# Patient Record
Sex: MALE | Race: WHITE | NOT HISPANIC OR LATINO | URBAN - METROPOLITAN AREA
[De-identification: names, ages, dates, MRNs, and addresses within clinical notes are randomized per-mention and may not be internally consistent; named-entity substitution may affect disease eponyms.]

---

## 2018-03-27 ENCOUNTER — OUTPATIENT (OUTPATIENT)
Dept: OUTPATIENT SERVICES | Facility: HOSPITAL | Age: 63
LOS: 1 days | Discharge: HOME | End: 2018-03-27

## 2018-03-27 DIAGNOSIS — R10.9 UNSPECIFIED ABDOMINAL PAIN: ICD-10-CM

## 2022-04-18 ENCOUNTER — INPATIENT (INPATIENT)
Facility: HOSPITAL | Age: 67
LOS: 6 days | Discharge: NURSING, REHAB OR LONG-TERM CARE | End: 2022-04-25
Attending: HOSPITALIST | Admitting: HOSPITALIST
Payer: MEDICARE

## 2022-04-18 VITALS
WEIGHT: 231.93 LBS | RESPIRATION RATE: 20 BRPM | HEART RATE: 123 BPM | SYSTOLIC BLOOD PRESSURE: 205 MMHG | DIASTOLIC BLOOD PRESSURE: 109 MMHG | OXYGEN SATURATION: 92 % | HEIGHT: 72 IN | TEMPERATURE: 96 F

## 2022-04-18 LAB
ALBUMIN SERPL ELPH-MCNC: 4.5 G/DL — SIGNIFICANT CHANGE UP (ref 3.5–5.2)
ALP SERPL-CCNC: 87 U/L — SIGNIFICANT CHANGE UP (ref 30–115)
ALT FLD-CCNC: 9 U/L — SIGNIFICANT CHANGE UP (ref 0–41)
ANION GAP SERPL CALC-SCNC: 21 MMOL/L — HIGH (ref 7–14)
AST SERPL-CCNC: 10 U/L — SIGNIFICANT CHANGE UP (ref 0–41)
B-OH-BUTYR SERPL-SCNC: 1.6 MMOL/L — HIGH
BASE EXCESS BLDV CALC-SCNC: 0.9 MMOL/L — SIGNIFICANT CHANGE UP (ref -2–3)
BASOPHILS # BLD AUTO: 0.03 K/UL — SIGNIFICANT CHANGE UP (ref 0–0.2)
BASOPHILS NFR BLD AUTO: 0.2 % — SIGNIFICANT CHANGE UP (ref 0–1)
BILIRUB SERPL-MCNC: 1.2 MG/DL — SIGNIFICANT CHANGE UP (ref 0.2–1.2)
BUN SERPL-MCNC: 13 MG/DL — SIGNIFICANT CHANGE UP (ref 10–20)
CA-I SERPL-SCNC: 0.99 MMOL/L — LOW (ref 1.15–1.33)
CALCIUM SERPL-MCNC: 9.4 MG/DL — SIGNIFICANT CHANGE UP (ref 8.5–10.1)
CHLORIDE SERPL-SCNC: 91 MMOL/L — LOW (ref 98–110)
CO2 SERPL-SCNC: 23 MMOL/L — SIGNIFICANT CHANGE UP (ref 17–32)
CREAT SERPL-MCNC: 1.1 MG/DL — SIGNIFICANT CHANGE UP (ref 0.7–1.5)
EGFR: 74 ML/MIN/1.73M2 — SIGNIFICANT CHANGE UP
EOSINOPHIL # BLD AUTO: 0 K/UL — SIGNIFICANT CHANGE UP (ref 0–0.7)
EOSINOPHIL NFR BLD AUTO: 0 % — SIGNIFICANT CHANGE UP (ref 0–8)
GAS PNL BLDV: 137 MMOL/L — SIGNIFICANT CHANGE UP (ref 136–145)
GAS PNL BLDV: SIGNIFICANT CHANGE UP
GLUCOSE SERPL-MCNC: 306 MG/DL — HIGH (ref 70–99)
HCO3 BLDV-SCNC: 25 MMOL/L — SIGNIFICANT CHANGE UP (ref 22–29)
HCT VFR BLD CALC: 35.7 % — LOW (ref 42–52)
HCT VFR BLDA CALC: 32 % — LOW (ref 39–51)
HGB BLD CALC-MCNC: 10.8 G/DL — LOW (ref 12.6–17.4)
HGB BLD-MCNC: 12.8 G/DL — LOW (ref 14–18)
IMM GRANULOCYTES NFR BLD AUTO: 1.4 % — HIGH (ref 0.1–0.3)
LACTATE BLDV-MCNC: 1.4 MMOL/L — SIGNIFICANT CHANGE UP (ref 0.5–2)
LACTATE SERPL-SCNC: 2 MMOL/L — SIGNIFICANT CHANGE UP (ref 0.7–2)
LIDOCAIN IGE QN: 10 U/L — SIGNIFICANT CHANGE UP (ref 7–60)
LYMPHOCYTES # BLD AUTO: 0.63 K/UL — LOW (ref 1.2–3.4)
LYMPHOCYTES # BLD AUTO: 4.5 % — LOW (ref 20.5–51.1)
MCHC RBC-ENTMCNC: 31 PG — SIGNIFICANT CHANGE UP (ref 27–31)
MCHC RBC-ENTMCNC: 35.9 G/DL — SIGNIFICANT CHANGE UP (ref 32–37)
MCV RBC AUTO: 86.4 FL — SIGNIFICANT CHANGE UP (ref 80–94)
MONOCYTES # BLD AUTO: 1 K/UL — HIGH (ref 0.1–0.6)
MONOCYTES NFR BLD AUTO: 7.2 % — SIGNIFICANT CHANGE UP (ref 1.7–9.3)
NEUTROPHILS # BLD AUTO: 12.07 K/UL — HIGH (ref 1.4–6.5)
NEUTROPHILS NFR BLD AUTO: 86.7 % — HIGH (ref 42.2–75.2)
NRBC # BLD: 0 /100 WBCS — SIGNIFICANT CHANGE UP (ref 0–0)
PCO2 BLDV: 38 MMHG — LOW (ref 42–55)
PH BLDV: 7.43 — SIGNIFICANT CHANGE UP (ref 7.32–7.43)
PLATELET # BLD AUTO: 194 K/UL — SIGNIFICANT CHANGE UP (ref 130–400)
PO2 BLDV: 39 MMHG — SIGNIFICANT CHANGE UP
POTASSIUM BLDV-SCNC: 2.5 MMOL/L — CRITICAL LOW (ref 3.5–5.1)
POTASSIUM SERPL-MCNC: 3.4 MMOL/L — LOW (ref 3.5–5)
POTASSIUM SERPL-SCNC: 3.4 MMOL/L — LOW (ref 3.5–5)
PROT SERPL-MCNC: 7.6 G/DL — SIGNIFICANT CHANGE UP (ref 6–8)
RBC # BLD: 4.13 M/UL — LOW (ref 4.7–6.1)
RBC # FLD: 12.6 % — SIGNIFICANT CHANGE UP (ref 11.5–14.5)
SAO2 % BLDV: 68.7 % — SIGNIFICANT CHANGE UP
SODIUM SERPL-SCNC: 135 MMOL/L — SIGNIFICANT CHANGE UP (ref 135–146)
WBC # BLD: 13.92 K/UL — HIGH (ref 4.8–10.8)
WBC # FLD AUTO: 13.92 K/UL — HIGH (ref 4.8–10.8)

## 2022-04-18 PROCEDURE — 93010 ELECTROCARDIOGRAM REPORT: CPT

## 2022-04-18 PROCEDURE — 74177 CT ABD & PELVIS W/CONTRAST: CPT | Mod: 26,MA

## 2022-04-18 PROCEDURE — 99223 1ST HOSP IP/OBS HIGH 75: CPT

## 2022-04-18 PROCEDURE — 71045 X-RAY EXAM CHEST 1 VIEW: CPT | Mod: 26

## 2022-04-18 PROCEDURE — 99285 EMERGENCY DEPT VISIT HI MDM: CPT

## 2022-04-18 RX ORDER — ONDANSETRON 8 MG/1
4 TABLET, FILM COATED ORAL ONCE
Refills: 0 | Status: COMPLETED | OUTPATIENT
Start: 2022-04-18 | End: 2022-04-18

## 2022-04-18 RX ORDER — POTASSIUM CHLORIDE 20 MEQ
40 PACKET (EA) ORAL ONCE
Refills: 0 | Status: COMPLETED | OUTPATIENT
Start: 2022-04-18 | End: 2022-04-18

## 2022-04-18 RX ORDER — POTASSIUM CHLORIDE 20 MEQ
10 PACKET (EA) ORAL ONCE
Refills: 0 | Status: COMPLETED | OUTPATIENT
Start: 2022-04-18 | End: 2022-04-18

## 2022-04-18 RX ORDER — SODIUM CHLORIDE 9 MG/ML
1000 INJECTION INTRAMUSCULAR; INTRAVENOUS; SUBCUTANEOUS ONCE
Refills: 0 | Status: COMPLETED | OUTPATIENT
Start: 2022-04-18 | End: 2022-04-18

## 2022-04-18 RX ORDER — ACETAMINOPHEN 500 MG
975 TABLET ORAL ONCE
Refills: 0 | Status: COMPLETED | OUTPATIENT
Start: 2022-04-18 | End: 2022-04-18

## 2022-04-18 RX ADMIN — Medication 975 MILLIGRAM(S): at 22:59

## 2022-04-18 RX ADMIN — SODIUM CHLORIDE 1000 MILLILITER(S): 9 INJECTION INTRAMUSCULAR; INTRAVENOUS; SUBCUTANEOUS at 18:01

## 2022-04-18 RX ADMIN — SODIUM CHLORIDE 1000 MILLILITER(S): 9 INJECTION INTRAMUSCULAR; INTRAVENOUS; SUBCUTANEOUS at 21:02

## 2022-04-18 RX ADMIN — Medication 40 MILLIEQUIVALENT(S): at 22:44

## 2022-04-18 RX ADMIN — ONDANSETRON 4 MILLIGRAM(S): 8 TABLET, FILM COATED ORAL at 18:01

## 2022-04-18 RX ADMIN — Medication 10 MILLIEQUIVALENT(S): at 20:14

## 2022-04-18 RX ADMIN — Medication 975 MILLIGRAM(S): at 22:44

## 2022-04-18 NOTE — ED PROVIDER NOTE - ATTENDING APP SHARED VISIT CONTRIBUTION OF CARE
67-year-old male with past medical history of CAD status post stent, hypertension, diabetes, hyperlipidemia presents to ED for nausea vomiting and weakness that started yesterday.  Patient states associated with general abdominal pain.  She has been unable to tolerate p.o. and has felt increasingly weak.  No cough, shortness of breath, chest pain, palpitations, fever or chills.    Const: NAD  Eyes: PERRL, no conjunctival injection  HENT:  Neck supple without meningismus   CV: tachycardic, Warm, well-perfused extremities  RESP: CTA B/L, no tachypnea   GI: soft, non-tender, non-distended  MSK: No gross deformities appreciated  Skin: Warm, dry. No rashes  Neuro: Alert, CNs II-XII grossly intact. Sensation and motor function of extremities grossly intact.  Psych: Appropriate mood and affect.    will do labs, CT, UA and give fluids

## 2022-04-18 NOTE — H&P ADULT - ASSESSMENT
68yo M w/ pmhx cad s/p stent 2008, DM, HTN, HLD, presenting to the ED for evaluation of nausea, vomiting, weakness for the past two days. Admitted for mild DKA.    Patient uses mail order [facility] ([phone]). Please verify med rec in am or have family bring meds list or pill bottles.  Prelim med rec done based on surecripts.    #N/V/D likely secondary to mild compensated DKA  #HAGMA w/ metabolic alkalosis  #SIRS + on admission likely secondary to DKA (leukocytosis, tachycardia)  - compliant w/ meds, unclear trigger  - WBC 13.92, K 3.4, lactate 2 -> 1.4, AG 21, BHB 1.6, glucose 306  - delta/delta > 9 (HAGMA from DKA, metabolic alkalosis from contraction)   - get a1c/lipid profile; get u/a; get GI PCR; get bcx in am and monitor off abx    - covid negative, get RVP   - LR 75cc/hr, zofran prn  - start subq insulin regimen 10/4/4/4 (insulin naive)   - f/u am BMP and consider upgrade to ICU if AG still open after subq insulin  - clear liquid diet advance as tolerated   - admitted to stepdown unit     #mild hypoxia  - patient consistently 92-94% but patient denies SOB  - has significant smoking history, monitor off O2 and get o/p PFTs  - get echo due to cardiac history, monitor for fluid overload     #hypertensive urgency  - did not take po meds in am due to N/V  - c/w amlodipine 5mg, lisinopril 40mg (benazepril) (please verify meds)   - hold HCTZ 25mg qd due to contraction alkalosis     #CAD s/p stent 2008  #HLD  - toprol 100mg daily, atorvastatin 40mg hs    #hypokalemia  - K 3.4 s/p repletion  - f/u am bmp     #diabetic neuropathy  - c/w gabapentin (verify meds)     #anxiety/depression  - c/w paxil 30mg daily (verify meds)     DVT ppx: lovenox  GI ppx: none  Diet: clear liquid diet   Code Status: full code  Dispo: from home; f/u am bmp, admit to stepdown unit

## 2022-04-18 NOTE — ED PROVIDER NOTE - NS ED ROS FT
Constitutional: (-) fever (-) malaise (-) diaphoresis (-) chills (-) wt. loss (-) bodyaches (-) night sweats  Eyes: (-) visual changes (-) eye pain (-) eye discharge (-) photophobia (-) FB sensation  ENMT: (-) nasal or chest congestion (-) runny nose (-) sore throat (-) hoarseness  (-) hearing changes (-) ear pain (-) ear discharge or infections (-) neck pain (-) neck stiffness  Cardiac: (-) chest pain  (-) palpitations (-) syncope (-) edema  Respiratory: (-) cough (-) SOB (-) [place]  GI: (+) nausea (+) vomiting (-) diarrhea (+) abdominal pain   : (-) dysuria (-) increased frequency  (-) hematuria (-) incontinence  MS: (-) back pain (-) myalgia (-) muscle weakness (-)  joint pain  Neuro: (-) headache (-) dizziness (-) numbness/tingling to extremities B/L (-) weakness  Skin: (-) rash (-) laceration    Except as documented in the HPI, all other systems are negative.

## 2022-04-18 NOTE — H&P ADULT - NSHPSOCIALHISTORY_GEN_ALL_CORE
from home walks chris stewart  quit smoking in 2008  used to spoke 4 packs a day for many years, was unable to quantify

## 2022-04-18 NOTE — ED PROVIDER NOTE - PHYSICAL EXAMINATION
GENERAL: Well-nourished, Well-developed. NAD.  HEAD: No visible or palpable bumps or hematomas. No ecchymosis behind ears B/L.  Eyes: PERRLA, EOMI. No asymmetry. No nystagmus. No conjunctival injection. Non-icteric sclera.  ENMT: MMM.   Neck: Supple. FROM  CVS: RRR. Normal S1,S2. No murmurs appreciated on auscultation   RESP: No use of accessory muscles. Chest rise symmetrical with good expansion. Lungs clear to auscultation B/L. No wheezing, rales, or rhonchi auscultated.  GI: Normal auscultation of bowel sounds in all 4 quadrants. (+)diffuse mild abd ttp. Soft, Nondistended. No guarding or rebound tenderness. No CVAT B/L.  Skin: Warm, Dry. No rashes or lesions. Good cap refill < 2 sec B/L.  EXT: Radial and pedal pulses present B/L. No calf tenderness or swelling B/L. No palpable cords. No pedal edema B/L.  Neuro: AA&O x 3. Sensation grossly intact. Strength 5/5 B/L. Gait within normal limits.

## 2022-04-18 NOTE — H&P ADULT - HISTORY OF PRESENT ILLNESS
68 yo M pmhx cad s/p stent, dm, htn, hld, presenting to the ED for evaluation of nausea, vomiting, weakness and abdominal pain since yesterday. Pt reports yesterday had few episodes of NBNB vomiting, worsened today, pt states today very weak, unable to tolerate po. Pt reports diffuse abdominal discomfort. Denies any sick contacts. Denies fever, chills, diarrhea, chest pain, sob.    ED course:   vitals: /109, , T 96.4F, O2 92% RA -> O2 96% on 2L NC  labs: WBC 13.92, K 3.4, lactate 2 -> 1.4, AG 21, BHB 1.6, glucose 306  imaging:   - CT a/p IV con: Colonic diverticula noted with no evidence of diverticulitis. No evidence of bowel obstruction, colitis, inflammatory process, or ascites. No pneumoperitoneum.   - CXR wet read: cardiomegaly w/ b/l opacities?  given: NS 2L bolus total, KCL 50meq total, tylenol, zofran  progress: MAR spoke to ICU regarding possible insulin drip for DKA, ICU rejected but admitted to stepdown and said to monitor on subq insulin for now 66 yo M pmhx cad s/p stent, dm, htn, hld, presenting to the ED for evaluation of nausea, vomiting, weakness and abdominal pain since yesterday. Pt reports yesterday had few episodes of NBNB vomiting, worsened today, pt states today very weak, unable to tolerate po. Pt reports diffuse abdominal discomfort. Denies any sick contacts. Denies fever, chills, diarrhea, chest pain, sob.    ED course:   vitals: /109, , T 96.4F, O2 92% RA -> O2 96% on 2L NC  labs: WBC 13.92, K 3.4, lactate 2 -> 1.4, AG 21, BHB 1.6, glucose 306  imaging:   - CT a/p IV con: Colonic diverticula noted with no evidence of diverticulitis. No evidence of bowel obstruction, colitis, inflammatory process, or ascites. No pneumoperitoneum.   - CXR wet read: cardiomegaly w/ b/l opacities?  given: NS 2L bolus total, KCL 50meq total, tylenol, zofran  progress: MAR spoke to ICU regarding possible insulin drip for DKA, ICU rejected but admitted to stepdown and said to monitor on subq insulin for now 66yo M w/ pmhx cad s/p stent 2008, DM, HTN, HLD, presenting to the ED for evaluation of nausea, vomiting, weakness for the past two days. Pt reports yesterday had few episodes of NBNB vomitin. Patientt states today he was very weak and unable to tolerate po which prompted him to come to ED. Also reports watery diarrhea without hematochezia. Denies any sick contacts. Denies fever, chills, CP, SOB, coughing, dysuria. Reports compliance with meds including his diabetes meds but was not able to take them today due to N/V.     ED course:   vitals: /109, , T 96.4F, O2 92% RA -> O2 96% on 2L NC  labs: WBC 13.92, K 3.4, lactate 2 -> 1.4, AG 21, BHB 1.6, glucose 306  imaging:   - CT a/p IV con: Colonic diverticula noted with no evidence of diverticulitis. No evidence of bowel obstruction, colitis, inflammatory process, or ascites. No pneumoperitoneum.   - CXR wet read: cardiomegaly w/ b/l opacities?  given: NS 2L bolus total, KCL 50meq total, tylenol, zofran  progress: MAR spoke to ICU regarding possible insulin drip for DKA, ICU rejected but admitted to stepdown and said to monitor on subq insulin for now

## 2022-04-18 NOTE — ED PROVIDER NOTE - CLINICAL SUMMARY MEDICAL DECISION MAKING FREE TEXT BOX
67-year-old male presented to ED for nausea vomiting and diarrhea.  Patient remains tachycardic unable to tolerate p.o. and appears weak.  Will admit for further evaluation management.

## 2022-04-18 NOTE — ED PROVIDER NOTE - OBJECTIVE STATEMENT
66 yo M pmhx cad s/p stent, dm, htn, hld, presenting to the ED for evaluation of nausea, vomiting, weakness and abdominal pain since yesterday. Pt reports yesterday had few episodes of NBNB vomiting, worsened today, pt states today very weak, unable to tolerate po. Pt reports diffuse abdominal discomfort. Denies any sick contacts. Denies fever, chills, diarrhea, chest pain, sob.

## 2022-04-18 NOTE — H&P ADULT - NSHPPHYSICALEXAM_GEN_ALL_CORE
PHYSICAL EXAM:  GENERAL: diaphoretic but comfortable  HEAD:  Atraumatic, Normocephalic  EYES: EOMI, PERRLA, conjunctiva and sclera clear  ENT: Moist mucous membranes  NECK: Supple, No JVD  CHEST/LUNG: Clear to auscultation bilaterally; No rales, rhonchi, wheezing, or rubs. Unlabored respirations  HEART: tachycardic, regular rhythm; No murmurs, rubs, or gallops  ABDOMEN: Bowel sounds present; Soft, Nontender, Nondistended. No hepatomegally  EXTREMITIES:  2+ Peripheral Pulses, brisk capillary refill. No clubbing, cyanosis, or edema  NERVOUS SYSTEM:  Alert & Oriented X3, speech clear. No deficits   MSK: FROM all 4 extremities, full and equal strength  SKIN: No rashes or lesions

## 2022-04-18 NOTE — ED ADULT TRIAGE NOTE - BP NONINVASIVE DIASTOLIC (MM HG)
109
Implemented All Universal Safety Interventions:  [place] to call system. Call bell, personal items and telephone within reach. Instruct patient to call for assistance. Room bathroom lighting operational. Non-slip footwear when patient is off stretcher. Physically safe environment: no spills, clutter or unnecessary equipment. Stretcher in lowest position, wheels locked, appropriate side rails in place.

## 2022-04-18 NOTE — H&P ADULT - NSHPLABSRESULTS_GEN_ALL_CORE
LABS:  cret                        12.8   13.92 )-----------( 194      ( 18 Apr 2022 18:32 )             35.7     04-18    135  |  91<L>  |  13  ----------------------------<  306<H>  3.4<L>   |  23  |  1.1    Ca    9.4      18 Apr 2022 18:32    TPro  7.6  /  Alb  4.5  /  TBili  1.2  /  DBili  x   /  AST  10  /  ALT  9   /  AlkPhos  87  04-18

## 2022-04-18 NOTE — H&P ADULT - NSICDXPASTMEDICALHX_GEN_ALL_CORE_FT
PAST MEDICAL HISTORY:  Anxiety     CAD S/P percutaneous coronary angioplasty     Diabetes mellitus     HTN (hypertension)     Hyperlipidemia

## 2022-04-18 NOTE — ED PROVIDER NOTE - CARE PLAN
Principal Discharge DX:	Nausea and vomiting  Secondary Diagnosis:	Sinus tachycardia  Secondary Diagnosis:	Hypokalemia   1

## 2022-04-18 NOTE — ED ADULT NURSE NOTE - OBJECTIVE STATEMENT
pt alert and oriented x 3 but confused at times since this morning - son states he has been having vomiting and diarrhea since yesterday

## 2022-04-18 NOTE — H&P ADULT - ATTENDING COMMENTS
68 YO M with a PMH of CAD s/p stent, DM2, HTN, and HLD who presents to the hospital with a c/o persistent N/V for the past x 2 days. Associated with vague ABD discomfort and diaphoresis. Denies any diarrhea, fevers, CP, SOB, and palpitations.     In the ED, CT-AP w/ IV contrast showed no acute process. Glucose was 306, pt started on IVFs (NS). MICU consulted, pt accepted to SDU.     FMHx: Reviewed, not relevant    Physical exam shows pt in NAD. VSS, afebrile, not hypoxic on RA; diaphoretic. A&Ox3. Neuro exam without deficits, motor/sensory intact, no dysarthria, no facial asymmetry. Muscle strength/sensation intact. CTA B/L with no W/C/R. RRR, no M/G/R. ABD is soft and non-tender, normoactive BSs. LEs without swelling. No rashes. Labs and radiology as above.     N/V + ABD discomfort, suspect mild compensated DKA, rule out viral process. SDU. Serial BMPs. A1c. Lipids. Monitor electrolytes. IVFs (LR). Serial FSs Q2h. Do not overcorrect glucose, keep in range of 180-200, start D5LR if necessary. Start SC insulin STAT. PRN anti-emetics.   -FU RVP    Hypokalemia. Replace. Repeat BMP in the AM.     Hypertensive urgency. Restart home meds. Monitor VSs.   -Pt has been unable to take his BP meds the past x 2 days due to N/V    HX of CAD s/p stent, HTN, and HLD. Restart home meds, except as stated above. DVT PPX. Inform PCP of pt's admission to hospital. My note supersedes the residents note.     Spoke with family: Son (Angel) at bedside    Date seen by [email or web address] 66 YO M with a PMH of CAD s/p stent, DM2, HTN, and HLD who presents to the hospital with a c/o persistent N/V for the past x 2 days. Associated with vague ABD discomfort and diaphoresis. Denies any diarrhea, fevers, CP, SOB, and palpitations.     In the ED, CT-AP w/ IV contrast showed no acute process. Glucose was 306, pt started on IVFs (NS). MICU consulted, pt accepted to SDU.     FMHx: Reviewed, not relevant    Physical exam shows pt in NAD. VSS, afebrile, not hypoxic on RA; diaphoretic. A&Ox3. Neuro exam without deficits, motor/sensory intact, no dysarthria, no facial asymmetry. Muscle strength/sensation intact. CTA B/L with no W/C/R. RRR, no M/G/R. ABD is soft and non-tender, normoactive BSs. LEs without swelling. No rashes. Labs and radiology as above.     N/V + ABD discomfort, suspect mild compensated DKA, rule out viral process. SDU. Serial BMPs. A1c. Lipids. Monitor electrolytes. IVFs (LR). Serial FSs Q2h. Do not overcorrect glucose, keep in range of 180-200, start D5LR if necessary. Start SC insulin STAT. PRN anti-emetics.   -FU RVP  -Please obtain a UA    Hypokalemia. Replace. Repeat BMP in the AM.     Mixed picture: Delta-Delta > 9  -HAGMA (High Anion Gap Metabolic Acidosis) from diabetic ketoacidosis. IVFs (LR). Repeat lactate and BMP in the AM. Please obtain UA.   -Metabolic alkalosis from contraction. IVFs (LR). Repeat BMP in the AM.     Hypertensive urgency. Restart home meds. Monitor VSs.   -Pt has been unable to take his BP meds the past x 2 days due to N/V    HX of CAD s/p stent, HTN, and HLD. Restart home meds, except as stated above. DVT PPX. Inform PCP of pt's admission to hospital. My note supersedes the residents note.     Spoke with family: Son (Angel) at bedside    Date seen by Attendin22

## 2022-04-19 LAB
A1C WITH ESTIMATED AVERAGE GLUCOSE RESULT: 7.2 % — HIGH (ref 4–5.6)
ALBUMIN SERPL ELPH-MCNC: 2.2 G/DL — LOW (ref 3.5–5.2)
ALBUMIN SERPL ELPH-MCNC: 3.8 G/DL — SIGNIFICANT CHANGE UP (ref 3.5–5.2)
ALBUMIN SERPL ELPH-MCNC: 4.1 G/DL — SIGNIFICANT CHANGE UP (ref 3.5–5.2)
ALP SERPL-CCNC: 41 U/L — SIGNIFICANT CHANGE UP (ref 30–115)
ALP SERPL-CCNC: 77 U/L — SIGNIFICANT CHANGE UP (ref 30–115)
ALP SERPL-CCNC: 81 U/L — SIGNIFICANT CHANGE UP (ref 30–115)
ALT FLD-CCNC: 10 U/L — SIGNIFICANT CHANGE UP (ref 0–41)
ALT FLD-CCNC: 13 U/L — SIGNIFICANT CHANGE UP (ref 0–41)
ALT FLD-CCNC: 7 U/L — SIGNIFICANT CHANGE UP (ref 0–41)
ANION GAP SERPL CALC-SCNC: 12 MMOL/L — SIGNIFICANT CHANGE UP (ref 7–14)
ANION GAP SERPL CALC-SCNC: 14 MMOL/L — SIGNIFICANT CHANGE UP (ref 7–14)
ANION GAP SERPL CALC-SCNC: 14 MMOL/L — SIGNIFICANT CHANGE UP (ref 7–14)
ANION GAP SERPL CALC-SCNC: SIGNIFICANT CHANGE UP MMOL/L (ref 7–14)
APAP SERPL-MCNC: <5 UG/ML — LOW (ref 10–30)
APPEARANCE UR: CLEAR — SIGNIFICANT CHANGE UP
APTT BLD: 29.6 SEC — SIGNIFICANT CHANGE UP (ref 27–39.2)
AST SERPL-CCNC: 10 U/L — SIGNIFICANT CHANGE UP (ref 0–41)
AST SERPL-CCNC: 12 U/L — SIGNIFICANT CHANGE UP (ref 0–41)
AST SERPL-CCNC: 17 U/L — SIGNIFICANT CHANGE UP (ref 0–41)
BACTERIA # UR AUTO: NEGATIVE — SIGNIFICANT CHANGE UP
BASE EXCESS BLDA CALC-SCNC: 4.7 MMOL/L — HIGH (ref -2–3)
BASOPHILS # BLD AUTO: 0.03 K/UL — SIGNIFICANT CHANGE UP (ref 0–0.2)
BASOPHILS # BLD AUTO: 0.03 K/UL — SIGNIFICANT CHANGE UP (ref 0–0.2)
BASOPHILS NFR BLD AUTO: 0.2 % — SIGNIFICANT CHANGE UP (ref 0–1)
BASOPHILS NFR BLD AUTO: 0.3 % — SIGNIFICANT CHANGE UP (ref 0–1)
BILIRUB SERPL-MCNC: 0.5 MG/DL — SIGNIFICANT CHANGE UP (ref 0.2–1.2)
BILIRUB SERPL-MCNC: 0.8 MG/DL — SIGNIFICANT CHANGE UP (ref 0.2–1.2)
BILIRUB SERPL-MCNC: 1.1 MG/DL — SIGNIFICANT CHANGE UP (ref 0.2–1.2)
BILIRUB UR-MCNC: NEGATIVE — SIGNIFICANT CHANGE UP
BLD GP AB SCN SERPL QL: SIGNIFICANT CHANGE UP
BUN SERPL-MCNC: 11 MG/DL — SIGNIFICANT CHANGE UP (ref 10–20)
BUN SERPL-MCNC: 11 MG/DL — SIGNIFICANT CHANGE UP (ref 10–20)
BUN SERPL-MCNC: 13 MG/DL — SIGNIFICANT CHANGE UP (ref 10–20)
BUN SERPL-MCNC: 8 MG/DL — LOW (ref 10–20)
CALCIUM SERPL-MCNC: 8.5 MG/DL — SIGNIFICANT CHANGE UP (ref 8.5–10.1)
CALCIUM SERPL-MCNC: 8.8 MG/DL — SIGNIFICANT CHANGE UP (ref 8.5–10.1)
CALCIUM SERPL-MCNC: 9.1 MG/DL — SIGNIFICANT CHANGE UP (ref 8.5–10.1)
CALCIUM SERPL-MCNC: SIGNIFICANT CHANGE UP MG/DL (ref 8.5–10.1)
CHLORIDE SERPL-SCNC: 98 MMOL/L — SIGNIFICANT CHANGE UP (ref 98–110)
CHLORIDE SERPL-SCNC: 99 MMOL/L — SIGNIFICANT CHANGE UP (ref 98–110)
CHLORIDE SERPL-SCNC: 99 MMOL/L — SIGNIFICANT CHANGE UP (ref 98–110)
CHLORIDE SERPL-SCNC: <60 MMOL/L — LOW (ref 98–110)
CHOLEST SERPL-MCNC: 154 MG/DL — SIGNIFICANT CHANGE UP
CK SERPL-CCNC: 112 U/L — SIGNIFICANT CHANGE UP (ref 0–225)
CO2 SERPL-SCNC: 13 MMOL/L — LOW (ref 17–32)
CO2 SERPL-SCNC: 23 MMOL/L — SIGNIFICANT CHANGE UP (ref 17–32)
CO2 SERPL-SCNC: 23 MMOL/L — SIGNIFICANT CHANGE UP (ref 17–32)
CO2 SERPL-SCNC: 26 MMOL/L — SIGNIFICANT CHANGE UP (ref 17–32)
COLOR SPEC: YELLOW — SIGNIFICANT CHANGE UP
CREAT SERPL-MCNC: 0.9 MG/DL — SIGNIFICANT CHANGE UP (ref 0.7–1.5)
CREAT SERPL-MCNC: 1 MG/DL — SIGNIFICANT CHANGE UP (ref 0.7–1.5)
D DIMER BLD IA.RAPID-MCNC: 207 NG/ML DDU — SIGNIFICANT CHANGE UP (ref 0–230)
D DIMER BLD IA.RAPID-MCNC: 208 NG/ML DDU — SIGNIFICANT CHANGE UP (ref 0–230)
D DIMER BLD IA.RAPID-MCNC: 343 NG/ML DDU — HIGH (ref 0–230)
DIFF PNL FLD: ABNORMAL
EGFR: 83 ML/MIN/1.73M2 — SIGNIFICANT CHANGE UP
EGFR: 94 ML/MIN/1.73M2 — SIGNIFICANT CHANGE UP
EOSINOPHIL # BLD AUTO: 0 K/UL — SIGNIFICANT CHANGE UP (ref 0–0.7)
EOSINOPHIL # BLD AUTO: 0 K/UL — SIGNIFICANT CHANGE UP (ref 0–0.7)
EOSINOPHIL NFR BLD AUTO: 0 % — SIGNIFICANT CHANGE UP (ref 0–8)
EOSINOPHIL NFR BLD AUTO: 0 % — SIGNIFICANT CHANGE UP (ref 0–8)
EPI CELLS # UR: 5 /HPF — SIGNIFICANT CHANGE UP (ref 0–5)
ESTIMATED AVERAGE GLUCOSE: 160 MG/DL — HIGH (ref 68–114)
GAS PNL BLDA: SIGNIFICANT CHANGE UP
GLUCOSE BLDC GLUCOMTR-MCNC: 199 MG/DL — HIGH (ref 70–99)
GLUCOSE BLDC GLUCOMTR-MCNC: 201 MG/DL — HIGH (ref 70–99)
GLUCOSE BLDC GLUCOMTR-MCNC: 238 MG/DL — HIGH (ref 70–99)
GLUCOSE BLDC GLUCOMTR-MCNC: 241 MG/DL — HIGH (ref 70–99)
GLUCOSE BLDC GLUCOMTR-MCNC: 241 MG/DL — HIGH (ref 70–99)
GLUCOSE BLDC GLUCOMTR-MCNC: 243 MG/DL — HIGH (ref 70–99)
GLUCOSE BLDC GLUCOMTR-MCNC: 248 MG/DL — HIGH (ref 70–99)
GLUCOSE BLDC GLUCOMTR-MCNC: 257 MG/DL — HIGH (ref 70–99)
GLUCOSE SERPL-MCNC: 215 MG/DL — HIGH (ref 70–99)
GLUCOSE SERPL-MCNC: 266 MG/DL — HIGH (ref 70–99)
GLUCOSE SERPL-MCNC: 267 MG/DL — HIGH (ref 70–99)
GLUCOSE SERPL-MCNC: SIGNIFICANT CHANGE UP MG/DL (ref 70–99)
GLUCOSE UR QL: ABNORMAL
HCO3 BLDA-SCNC: 28 MMOL/L — SIGNIFICANT CHANGE UP (ref 21–28)
HCT VFR BLD CALC: 32.3 % — LOW (ref 42–52)
HCT VFR BLD CALC: 32.4 % — LOW (ref 42–52)
HCT VFR BLD CALC: SIGNIFICANT CHANGE UP % (ref 42–52)
HCV AB S/CO SERPL IA: 0.03 COI — SIGNIFICANT CHANGE UP
HCV AB SERPL-IMP: SIGNIFICANT CHANGE UP
HDLC SERPL-MCNC: 41 MG/DL — SIGNIFICANT CHANGE UP
HGB BLD-MCNC: 11.1 G/DL — LOW (ref 14–18)
HGB BLD-MCNC: 11.4 G/DL — LOW (ref 14–18)
HGB BLD-MCNC: SIGNIFICANT CHANGE UP G/DL (ref 14–18)
HYALINE CASTS # UR AUTO: 1 /LPF — SIGNIFICANT CHANGE UP (ref 0–7)
IMM GRANULOCYTES NFR BLD AUTO: 1.8 % — HIGH (ref 0.1–0.3)
IMM GRANULOCYTES NFR BLD AUTO: 1.9 % — HIGH (ref 0.1–0.3)
INR BLD: 1.36 RATIO — HIGH (ref 0.65–1.3)
KETONES UR-MCNC: ABNORMAL
LACTATE SERPL-SCNC: 1.7 MMOL/L — SIGNIFICANT CHANGE UP (ref 0.7–2)
LEUKOCYTE ESTERASE UR-ACNC: NEGATIVE — SIGNIFICANT CHANGE UP
LIPID PNL WITH DIRECT LDL SERPL: 94 MG/DL — SIGNIFICANT CHANGE UP
LYMPHOCYTES # BLD AUTO: 0.69 K/UL — LOW (ref 1.2–3.4)
LYMPHOCYTES # BLD AUTO: 0.75 K/UL — LOW (ref 1.2–3.4)
LYMPHOCYTES # BLD AUTO: 5.7 % — LOW (ref 20.5–51.1)
LYMPHOCYTES # BLD AUTO: 6.6 % — LOW (ref 20.5–51.1)
MAGNESIUM SERPL-MCNC: 1 MG/DL — LOW (ref 1.8–2.4)
MAGNESIUM SERPL-MCNC: 1.2 MG/DL — LOW (ref 1.8–2.4)
MAGNESIUM SERPL-MCNC: 1.3 MG/DL — LOW (ref 1.8–2.4)
MAGNESIUM SERPL-MCNC: 1.8 MG/DL — SIGNIFICANT CHANGE UP (ref 1.8–2.4)
MCHC RBC-ENTMCNC: 29.9 PG — SIGNIFICANT CHANGE UP (ref 27–31)
MCHC RBC-ENTMCNC: 31 PG — SIGNIFICANT CHANGE UP (ref 27–31)
MCHC RBC-ENTMCNC: 34.3 G/DL — SIGNIFICANT CHANGE UP (ref 32–37)
MCHC RBC-ENTMCNC: 35.3 G/DL — SIGNIFICANT CHANGE UP (ref 32–37)
MCHC RBC-ENTMCNC: SIGNIFICANT CHANGE UP G/DL (ref 32–37)
MCHC RBC-ENTMCNC: SIGNIFICANT CHANGE UP PG (ref 27–31)
MCV RBC AUTO: 87.3 FL — SIGNIFICANT CHANGE UP (ref 80–94)
MCV RBC AUTO: 87.8 FL — SIGNIFICANT CHANGE UP (ref 80–94)
MCV RBC AUTO: SIGNIFICANT CHANGE UP FL (ref 80–94)
MONOCYTES # BLD AUTO: 0.78 K/UL — HIGH (ref 0.1–0.6)
MONOCYTES # BLD AUTO: 0.82 K/UL — HIGH (ref 0.1–0.6)
MONOCYTES NFR BLD AUTO: 6.8 % — SIGNIFICANT CHANGE UP (ref 1.7–9.3)
MONOCYTES NFR BLD AUTO: 6.9 % — SIGNIFICANT CHANGE UP (ref 1.7–9.3)
NEUTROPHILS # BLD AUTO: 10.35 K/UL — HIGH (ref 1.4–6.5)
NEUTROPHILS # BLD AUTO: 9.58 K/UL — HIGH (ref 1.4–6.5)
NEUTROPHILS NFR BLD AUTO: 84.3 % — HIGH (ref 42.2–75.2)
NEUTROPHILS NFR BLD AUTO: 85.5 % — HIGH (ref 42.2–75.2)
NITRITE UR-MCNC: NEGATIVE — SIGNIFICANT CHANGE UP
NON HDL CHOLESTEROL: 113 MG/DL — SIGNIFICANT CHANGE UP
NRBC # BLD: 0 /100 WBCS — SIGNIFICANT CHANGE UP (ref 0–0)
NT-PROBNP SERPL-SCNC: 1609 PG/ML — HIGH (ref 0–300)
PCO2 BLDA: 37 MMHG — SIGNIFICANT CHANGE UP (ref 35–48)
PH BLDA: 7.49 — HIGH (ref 7.35–7.45)
PH UR: 6.5 — SIGNIFICANT CHANGE UP (ref 5–8)
PHOSPHATE SERPL-MCNC: 2.1 MG/DL — SIGNIFICANT CHANGE UP (ref 2.1–4.9)
PLATELET # BLD AUTO: 186 K/UL — SIGNIFICANT CHANGE UP (ref 130–400)
PLATELET # BLD AUTO: 189 K/UL — SIGNIFICANT CHANGE UP (ref 130–400)
PLATELET # BLD AUTO: SIGNIFICANT CHANGE UP K/UL (ref 130–400)
PO2 BLDA: 64 MMHG — LOW (ref 83–108)
POTASSIUM SERPL-MCNC: 3.5 MMOL/L — SIGNIFICANT CHANGE UP (ref 3.5–5)
POTASSIUM SERPL-MCNC: 3.7 MMOL/L — SIGNIFICANT CHANGE UP (ref 3.5–5)
POTASSIUM SERPL-MCNC: 3.8 MMOL/L — SIGNIFICANT CHANGE UP (ref 3.5–5)
POTASSIUM SERPL-MCNC: SIGNIFICANT CHANGE UP MMOL/L (ref 3.5–5)
POTASSIUM SERPL-SCNC: 3.5 MMOL/L — SIGNIFICANT CHANGE UP (ref 3.5–5)
POTASSIUM SERPL-SCNC: 3.7 MMOL/L — SIGNIFICANT CHANGE UP (ref 3.5–5)
POTASSIUM SERPL-SCNC: 3.8 MMOL/L — SIGNIFICANT CHANGE UP (ref 3.5–5)
POTASSIUM SERPL-SCNC: SIGNIFICANT CHANGE UP MMOL/L (ref 3.5–5)
PROCALCITONIN SERPL-MCNC: 0.17 NG/ML — HIGH (ref 0.02–0.1)
PROT SERPL-MCNC: 3.9 G/DL — LOW (ref 6–8)
PROT SERPL-MCNC: 6.5 G/DL — SIGNIFICANT CHANGE UP (ref 6–8)
PROT SERPL-MCNC: 6.7 G/DL — SIGNIFICANT CHANGE UP (ref 6–8)
PROT UR-MCNC: ABNORMAL
PROTHROM AB SERPL-ACNC: 15.6 SEC — HIGH (ref 9.95–12.87)
RAPID RVP RESULT: SIGNIFICANT CHANGE UP
RBC # BLD: 3.68 M/UL — LOW (ref 4.7–6.1)
RBC # BLD: 3.71 M/UL — LOW (ref 4.7–6.1)
RBC # BLD: SIGNIFICANT CHANGE UP M/UL (ref 4.7–6.1)
RBC # FLD: 12.6 % — SIGNIFICANT CHANGE UP (ref 11.5–14.5)
RBC # FLD: 12.7 % — SIGNIFICANT CHANGE UP (ref 11.5–14.5)
RBC # FLD: SIGNIFICANT CHANGE UP % (ref 11.5–14.5)
RBC CASTS # UR COMP ASSIST: 5 /HPF — HIGH (ref 0–4)
SALICYLATES SERPL-MCNC: <0.3 MG/DL — LOW (ref 4–30)
SAO2 % BLDA: 95.4 % — SIGNIFICANT CHANGE UP (ref 94–98)
SARS-COV-2 RNA SPEC QL NAA+PROBE: SIGNIFICANT CHANGE UP
SARS-COV-2 RNA SPEC QL NAA+PROBE: SIGNIFICANT CHANGE UP
SODIUM SERPL-SCNC: 135 MMOL/L — SIGNIFICANT CHANGE UP (ref 135–146)
SODIUM SERPL-SCNC: 136 MMOL/L — SIGNIFICANT CHANGE UP (ref 135–146)
SODIUM SERPL-SCNC: 137 MMOL/L — SIGNIFICANT CHANGE UP (ref 135–146)
SODIUM SERPL-SCNC: SIGNIFICANT CHANGE UP MMOL/L (ref 135–146)
SP GR SPEC: 1.02 — SIGNIFICANT CHANGE UP (ref 1.01–1.03)
TRIGL SERPL-MCNC: 95 MG/DL — SIGNIFICANT CHANGE UP
TROPONIN T SERPL-MCNC: <0.01 NG/ML — SIGNIFICANT CHANGE UP
TROPONIN T SERPL-MCNC: <0.01 NG/ML — SIGNIFICANT CHANGE UP
UROBILINOGEN FLD QL: ABNORMAL
WBC # BLD: 11.35 K/UL — HIGH (ref 4.8–10.8)
WBC # BLD: 12.11 K/UL — HIGH (ref 4.8–10.8)
WBC # BLD: 6.7 K/UL — SIGNIFICANT CHANGE UP (ref 4.8–10.8)
WBC # FLD AUTO: 11.35 K/UL — HIGH (ref 4.8–10.8)
WBC # FLD AUTO: 12.11 K/UL — HIGH (ref 4.8–10.8)
WBC # FLD AUTO: 6.7 K/UL — SIGNIFICANT CHANGE UP (ref 4.8–10.8)
WBC UR QL: 1 /HPF — SIGNIFICANT CHANGE UP (ref 0–5)

## 2022-04-19 PROCEDURE — 70450 CT HEAD/BRAIN W/O DYE: CPT | Mod: 26

## 2022-04-19 PROCEDURE — 99291 CRITICAL CARE FIRST HOUR: CPT

## 2022-04-19 PROCEDURE — 93010 ELECTROCARDIOGRAM REPORT: CPT | Mod: 77

## 2022-04-19 PROCEDURE — 93010 ELECTROCARDIOGRAM REPORT: CPT

## 2022-04-19 PROCEDURE — 93970 EXTREMITY STUDY: CPT | Mod: 26

## 2022-04-19 PROCEDURE — 71045 X-RAY EXAM CHEST 1 VIEW: CPT | Mod: 26

## 2022-04-19 PROCEDURE — 93306 TTE W/DOPPLER COMPLETE: CPT | Mod: 26

## 2022-04-19 RX ORDER — GLIMEPIRIDE 1 MG
1 TABLET ORAL
Qty: 0 | Refills: 0 | DISCHARGE

## 2022-04-19 RX ORDER — MAGNESIUM SULFATE 500 MG/ML
2 VIAL (ML) INJECTION ONCE
Refills: 0 | Status: COMPLETED | OUTPATIENT
Start: 2022-04-19 | End: 2022-04-19

## 2022-04-19 RX ORDER — AMLODIPINE BESYLATE 2.5 MG/1
10 TABLET ORAL DAILY
Refills: 0 | Status: DISCONTINUED | OUTPATIENT
Start: 2022-04-20 | End: 2022-04-25

## 2022-04-19 RX ORDER — ATORVASTATIN CALCIUM 80 MG/1
40 TABLET, FILM COATED ORAL AT BEDTIME
Refills: 0 | Status: DISCONTINUED | OUTPATIENT
Start: 2022-04-19 | End: 2022-04-25

## 2022-04-19 RX ORDER — INSULIN GLARGINE 100 [IU]/ML
10 INJECTION, SOLUTION SUBCUTANEOUS ONCE
Refills: 0 | Status: COMPLETED | OUTPATIENT
Start: 2022-04-19 | End: 2022-04-19

## 2022-04-19 RX ORDER — DEXTROSE 50 % IN WATER 50 %
15 SYRINGE (ML) INTRAVENOUS ONCE
Refills: 0 | Status: DISCONTINUED | OUTPATIENT
Start: 2022-04-19 | End: 2022-04-25

## 2022-04-19 RX ORDER — ACYCLOVIR SODIUM 500 MG
VIAL (EA) INTRAVENOUS
Refills: 0 | Status: DISCONTINUED | OUTPATIENT
Start: 2022-04-19 | End: 2022-04-20

## 2022-04-19 RX ORDER — INSULIN GLARGINE 100 [IU]/ML
15 INJECTION, SOLUTION SUBCUTANEOUS AT BEDTIME
Refills: 0 | Status: DISCONTINUED | OUTPATIENT
Start: 2022-04-19 | End: 2022-04-20

## 2022-04-19 RX ORDER — AMLODIPINE BESYLATE 2.5 MG/1
5 TABLET ORAL DAILY
Refills: 0 | Status: DISCONTINUED | OUTPATIENT
Start: 2022-04-19 | End: 2022-04-19

## 2022-04-19 RX ORDER — ACYCLOVIR SODIUM 500 MG
1050 VIAL (EA) INTRAVENOUS EVERY 8 HOURS
Refills: 0 | Status: DISCONTINUED | OUTPATIENT
Start: 2022-04-19 | End: 2022-04-20

## 2022-04-19 RX ORDER — FUROSEMIDE 40 MG
40 TABLET ORAL DAILY
Refills: 0 | Status: DISCONTINUED | OUTPATIENT
Start: 2022-04-19 | End: 2022-04-25

## 2022-04-19 RX ORDER — VANCOMYCIN HCL 1 G
1500 VIAL (EA) INTRAVENOUS EVERY 12 HOURS
Refills: 0 | Status: DISCONTINUED | OUTPATIENT
Start: 2022-04-19 | End: 2022-04-20

## 2022-04-19 RX ORDER — GLUCAGON INJECTION, SOLUTION 0.5 MG/.1ML
1 INJECTION, SOLUTION SUBCUTANEOUS ONCE
Refills: 0 | Status: DISCONTINUED | OUTPATIENT
Start: 2022-04-19 | End: 2022-04-25

## 2022-04-19 RX ORDER — AMPICILLIN TRIHYDRATE 250 MG
CAPSULE ORAL
Refills: 0 | Status: DISCONTINUED | OUTPATIENT
Start: 2022-04-19 | End: 2022-04-20

## 2022-04-19 RX ORDER — METFORMIN HYDROCHLORIDE 850 MG/1
1 TABLET ORAL
Qty: 0 | Refills: 0 | DISCHARGE

## 2022-04-19 RX ORDER — INSULIN LISPRO 100/ML
8 VIAL (ML) SUBCUTANEOUS
Refills: 0 | Status: DISCONTINUED | OUTPATIENT
Start: 2022-04-19 | End: 2022-04-19

## 2022-04-19 RX ORDER — POTASSIUM CHLORIDE 20 MEQ
40 PACKET (EA) ORAL ONCE
Refills: 0 | Status: COMPLETED | OUTPATIENT
Start: 2022-04-19 | End: 2022-04-19

## 2022-04-19 RX ORDER — DEXTROSE 50 % IN WATER 50 %
25 SYRINGE (ML) INTRAVENOUS ONCE
Refills: 0 | Status: DISCONTINUED | OUTPATIENT
Start: 2022-04-19 | End: 2022-04-25

## 2022-04-19 RX ORDER — ACYCLOVIR SODIUM 500 MG
1050 VIAL (EA) INTRAVENOUS ONCE
Refills: 0 | Status: COMPLETED | OUTPATIENT
Start: 2022-04-19 | End: 2022-04-19

## 2022-04-19 RX ORDER — ACETAMINOPHEN 500 MG
650 TABLET ORAL EVERY 6 HOURS
Refills: 0 | Status: DISCONTINUED | OUTPATIENT
Start: 2022-04-19 | End: 2022-04-21

## 2022-04-19 RX ORDER — SODIUM CHLORIDE 9 MG/ML
1000 INJECTION, SOLUTION INTRAVENOUS
Refills: 0 | Status: DISCONTINUED | OUTPATIENT
Start: 2022-04-19 | End: 2022-04-25

## 2022-04-19 RX ORDER — ACETAMINOPHEN 500 MG
650 TABLET ORAL EVERY 6 HOURS
Refills: 0 | Status: DISCONTINUED | OUTPATIENT
Start: 2022-04-19 | End: 2022-04-25

## 2022-04-19 RX ORDER — INSULIN GLARGINE 100 [IU]/ML
25 INJECTION, SOLUTION SUBCUTANEOUS AT BEDTIME
Refills: 0 | Status: DISCONTINUED | OUTPATIENT
Start: 2022-04-19 | End: 2022-04-19

## 2022-04-19 RX ORDER — CHLORHEXIDINE GLUCONATE 213 G/1000ML
1 SOLUTION TOPICAL
Refills: 0 | Status: DISCONTINUED | OUTPATIENT
Start: 2022-04-19 | End: 2022-04-25

## 2022-04-19 RX ORDER — CEFTRIAXONE 500 MG/1
2000 INJECTION, POWDER, FOR SOLUTION INTRAMUSCULAR; INTRAVENOUS EVERY 12 HOURS
Refills: 0 | Status: DISCONTINUED | OUTPATIENT
Start: 2022-04-19 | End: 2022-04-23

## 2022-04-19 RX ORDER — AMPICILLIN TRIHYDRATE 250 MG
1 CAPSULE ORAL EVERY 6 HOURS
Refills: 0 | Status: DISCONTINUED | OUTPATIENT
Start: 2022-04-19 | End: 2022-04-20

## 2022-04-19 RX ORDER — SODIUM CHLORIDE 9 MG/ML
500 INJECTION, SOLUTION INTRAVENOUS ONCE
Refills: 0 | Status: COMPLETED | OUTPATIENT
Start: 2022-04-19 | End: 2022-04-19

## 2022-04-19 RX ORDER — INSULIN LISPRO 100/ML
6 VIAL (ML) SUBCUTANEOUS
Refills: 0 | Status: DISCONTINUED | OUTPATIENT
Start: 2022-04-19 | End: 2022-04-20

## 2022-04-19 RX ORDER — INSULIN GLARGINE 100 [IU]/ML
10 INJECTION, SOLUTION SUBCUTANEOUS AT BEDTIME
Refills: 0 | Status: DISCONTINUED | OUTPATIENT
Start: 2022-04-19 | End: 2022-04-19

## 2022-04-19 RX ORDER — DEXTROSE 50 % IN WATER 50 %
12.5 SYRINGE (ML) INTRAVENOUS ONCE
Refills: 0 | Status: DISCONTINUED | OUTPATIENT
Start: 2022-04-19 | End: 2022-04-25

## 2022-04-19 RX ORDER — PANTOPRAZOLE SODIUM 20 MG/1
40 TABLET, DELAYED RELEASE ORAL
Refills: 0 | Status: DISCONTINUED | OUTPATIENT
Start: 2022-04-19 | End: 2022-04-25

## 2022-04-19 RX ORDER — INSULIN LISPRO 100/ML
4 VIAL (ML) SUBCUTANEOUS
Refills: 0 | Status: DISCONTINUED | OUTPATIENT
Start: 2022-04-19 | End: 2022-04-19

## 2022-04-19 RX ORDER — SODIUM CHLORIDE 9 MG/ML
1000 INJECTION, SOLUTION INTRAVENOUS
Refills: 0 | Status: DISCONTINUED | OUTPATIENT
Start: 2022-04-19 | End: 2022-04-19

## 2022-04-19 RX ORDER — ENOXAPARIN SODIUM 100 MG/ML
40 INJECTION SUBCUTANEOUS EVERY 24 HOURS
Refills: 0 | Status: DISCONTINUED | OUTPATIENT
Start: 2022-04-19 | End: 2022-04-25

## 2022-04-19 RX ORDER — METOPROLOL TARTRATE 50 MG
50 TABLET ORAL DAILY
Refills: 0 | Status: DISCONTINUED | OUTPATIENT
Start: 2022-04-19 | End: 2022-04-20

## 2022-04-19 RX ORDER — SODIUM CHLORIDE 9 MG/ML
1000 INJECTION, SOLUTION INTRAVENOUS ONCE
Refills: 0 | Status: DISCONTINUED | OUTPATIENT
Start: 2022-04-19 | End: 2022-04-19

## 2022-04-19 RX ORDER — LISINOPRIL 2.5 MG/1
40 TABLET ORAL DAILY
Refills: 0 | Status: DISCONTINUED | OUTPATIENT
Start: 2022-04-19 | End: 2022-04-25

## 2022-04-19 RX ORDER — METOPROLOL TARTRATE 50 MG
1 TABLET ORAL
Qty: 0 | Refills: 0 | DISCHARGE

## 2022-04-19 RX ORDER — AMLODIPINE BESYLATE 2.5 MG/1
5 TABLET ORAL ONCE
Refills: 0 | Status: COMPLETED | OUTPATIENT
Start: 2022-04-19 | End: 2022-04-19

## 2022-04-19 RX ORDER — AMPICILLIN TRIHYDRATE 250 MG
1 CAPSULE ORAL ONCE
Refills: 0 | Status: COMPLETED | OUTPATIENT
Start: 2022-04-19 | End: 2022-04-19

## 2022-04-19 RX ORDER — ONDANSETRON 8 MG/1
4 TABLET, FILM COATED ORAL EVERY 8 HOURS
Refills: 0 | Status: DISCONTINUED | OUTPATIENT
Start: 2022-04-19 | End: 2022-04-25

## 2022-04-19 RX ORDER — INSULIN LISPRO 100/ML
VIAL (ML) SUBCUTANEOUS
Refills: 0 | Status: DISCONTINUED | OUTPATIENT
Start: 2022-04-19 | End: 2022-04-20

## 2022-04-19 RX ADMIN — Medication 6 UNIT(S): at 17:20

## 2022-04-19 RX ADMIN — Medication 40 MILLIGRAM(S): at 22:04

## 2022-04-19 RX ADMIN — Medication 650 MILLIGRAM(S): at 18:10

## 2022-04-19 RX ADMIN — Medication 1: at 12:03

## 2022-04-19 RX ADMIN — AMLODIPINE BESYLATE 5 MILLIGRAM(S): 2.5 TABLET ORAL at 12:04

## 2022-04-19 RX ADMIN — ENOXAPARIN SODIUM 40 MILLIGRAM(S): 100 INJECTION SUBCUTANEOUS at 06:55

## 2022-04-19 RX ADMIN — SODIUM CHLORIDE 100 MILLILITER(S): 9 INJECTION, SOLUTION INTRAVENOUS at 14:11

## 2022-04-19 RX ADMIN — Medication 2: at 17:21

## 2022-04-19 RX ADMIN — INSULIN GLARGINE 10 UNIT(S): 100 INJECTION, SOLUTION SUBCUTANEOUS at 01:26

## 2022-04-19 RX ADMIN — Medication 108 GRAM(S): at 14:10

## 2022-04-19 RX ADMIN — Medication 300 MILLIGRAM(S): at 18:16

## 2022-04-19 RX ADMIN — SODIUM CHLORIDE 100 MILLILITER(S): 9 INJECTION, SOLUTION INTRAVENOUS at 18:00

## 2022-04-19 RX ADMIN — Medication 25 GRAM(S): at 14:10

## 2022-04-19 RX ADMIN — Medication 271 MILLIGRAM(S): at 16:56

## 2022-04-19 RX ADMIN — CEFTRIAXONE 100 MILLIGRAM(S): 500 INJECTION, POWDER, FOR SOLUTION INTRAMUSCULAR; INTRAVENOUS at 22:03

## 2022-04-19 RX ADMIN — Medication 50 MILLIGRAM(S): at 01:52

## 2022-04-19 RX ADMIN — Medication 6 UNIT(S): at 12:03

## 2022-04-19 RX ADMIN — Medication 40 MILLIEQUIVALENT(S): at 06:50

## 2022-04-19 RX ADMIN — Medication 4 UNIT(S): at 01:26

## 2022-04-19 RX ADMIN — SODIUM CHLORIDE 500 MILLILITER(S): 9 INJECTION, SOLUTION INTRAVENOUS at 12:45

## 2022-04-19 RX ADMIN — PANTOPRAZOLE SODIUM 40 MILLIGRAM(S): 20 TABLET, DELAYED RELEASE ORAL at 17:25

## 2022-04-19 RX ADMIN — AMLODIPINE BESYLATE 5 MILLIGRAM(S): 2.5 TABLET ORAL at 06:55

## 2022-04-19 RX ADMIN — Medication 4 UNIT(S): at 07:56

## 2022-04-19 RX ADMIN — Medication 2: at 07:55

## 2022-04-19 RX ADMIN — Medication 108 GRAM(S): at 22:04

## 2022-04-19 RX ADMIN — CHLORHEXIDINE GLUCONATE 1 APPLICATION(S): 213 SOLUTION TOPICAL at 06:55

## 2022-04-19 RX ADMIN — LISINOPRIL 40 MILLIGRAM(S): 2.5 TABLET ORAL at 01:52

## 2022-04-19 RX ADMIN — SODIUM CHLORIDE 75 MILLILITER(S): 9 INJECTION, SOLUTION INTRAVENOUS at 07:57

## 2022-04-19 RX ADMIN — SODIUM CHLORIDE 75 MILLILITER(S): 9 INJECTION, SOLUTION INTRAVENOUS at 00:58

## 2022-04-19 RX ADMIN — SODIUM CHLORIDE 100 MILLILITER(S): 9 INJECTION, SOLUTION INTRAVENOUS at 12:06

## 2022-04-19 RX ADMIN — INSULIN GLARGINE 10 UNIT(S): 100 INJECTION, SOLUTION SUBCUTANEOUS at 13:01

## 2022-04-19 RX ADMIN — Medication 30 MILLIGRAM(S): at 12:05

## 2022-04-19 NOTE — GOALS OF CARE CONVERSATION - ADVANCED CARE PLANNING - CONVERSATION DETAILS
Team had a discussion with the patient and their family regarding their chronic illnesses and goals of care. Patient and their family understand the extent of their condition. Patient would like to pursue DNR/DNI

## 2022-04-19 NOTE — PROGRESS NOTE ADULT - ASSESSMENT
66yo M w/ pmhx cad s/p stent 2008, DM, HTN, HLD, presenting to the ED for evaluation of nausea, vomiting, weakness for the past two days. Admitted for mild DKA.    Patient uses mail order [facility] ([phone]). Please verify med rec in am or have family bring meds list or pill bottles.  Prelim med rec done based on surecripts.    #N/V/D likely secondary to mild compensated DKA  #HAGMA w/ metabolic alkalosis  #SIRS + on admission likely secondary to DKA (leukocytosis, tachycardia)  - compliant w/ meds, unclear trigger  - WBC 13.92, K 3.4, lactate 2 -> 1.4, AG 21, BHB 1.6, glucose 306  - delta/delta > 9 (HAGMA from DKA, metabolic alkalosis from contraction)   - get a1c/lipid profile; get u/a; get GI PCR; get bcx in am and monitor off abx    - covid negative, get RVP   - LR 75cc/hr, zofran prn  - start subq insulin regimen 10/4/4/4 (insulin naive)   - f/u am BMP and consider upgrade to ICU if AG still open after subq insulin  - clear liquid diet advance as tolerated   - admitted to stepdown unit     #mild hypoxia  - patient consistently 92-94% but patient denies SOB  - has significant smoking history, monitor off O2 and get o/p PFTs  - get echo due to cardiac history, monitor for fluid overload     #hypertensive urgency  - did not take po meds in am due to N/V  - c/w amlodipine 5mg, lisinopril 40mg (benazepril) (please verify meds)   - hold HCTZ 25mg qd due to contraction alkalosis     #CAD s/p stent 2008  #HLD  - toprol 100mg daily, atorvastatin 40mg hs    #hypokalemia  - K 3.4 s/p repletion  - f/u am bmp     #diabetic neuropathy  - c/w gabapentin (verify meds)     #anxiety/depression  - c/w paxil 30mg daily (verify meds)     DVT ppx: lovenox  GI ppx: none  Diet: clear liquid diet   Code Status: full code  Dispo: from home; f/u am bmp, admit to stepdown unit       68yo M w/ pmhx cad s/p stent 2008, DM, HTN, HLD, presenting to the ED for evaluation of nausea, vomiting, weakness for the past two days. Admitted for mild DKA.     66yo M w/ pmhx cad s/p stent 2008, DM, HTN, HLD, presenting to the ED for evaluation of nausea, vomiting, weakness for the past two days. Admitted for mild DKA.    IMPRESSION:  HAGMA with NAGMA  HO DM with diabetic neuropathy    #N/V/D likely secondary to mild compensated DKA  #HAGMA w/ metabolic alkalosis  #SIRS + on admission likely secondary to DKA (leukocytosis, tachycardia)  - WBC 13.92, K 3.4, lactate 2 -> 1.4, AG 21, BHB 1.6, glucose 306  - delta/delta > 9 (HAGMA from DKA, metabolic alkalosis from contraction)   - Chest xray Linear opacity overlies the right midlung field.  - CT AB/P No evidence of abdominal or pelvic mass or inflammatory process  - R/o infectious etiology   - A1c: 7.2   - get a1c/lipid profile; get u/a; get GI PCR; get bcx in am and monitor off abx    - covid negative, get RVP   - LR 75cc/hr, zofran prn  - start subq insulin regimen 10/4/4/4 (insulin naive)   - f/u am BMP and consider upgrade to ICU if AG still open after subq insulin  - clear liquid diet advance as tolerated   - admitted to stepdown unit     #mild hypoxia  - patient consistently 92-94% but patient denies SOB  - has significant smoking history, monitor off O2 and get o/p PFTs  - get echo due to cardiac history, monitor for fluid overload     #hypertensive urgency  - did not take po meds in am due to N/V  - c/w amlodipine 5mg, lisinopril 40mg (benazepril) (please verify meds)   - hold HCTZ 25mg qd due to contraction alkalosis     #CAD s/p stent 2008  #HLD  - toprol 100mg daily, atorvastatin 40mg hs    #hypokalemia  - K 3.4 s/p repletion  - f/u am bmp     #diabetic neuropathy  - c/w gabapentin (verify meds)     #anxiety/depression  - c/w paxil 30mg daily (verify meds)     DVT ppx: lovenox  GI ppx: none  Diet: clear liquid diet   Code Status: full code  Dispo: from home; f/u am bmp, admit to stepdown unit           66yo M w/ pmhx cad s/p stent 2008, DM, HTN, HLD, presenting to the ED for evaluation of nausea, vomiting, weakness for the past two days. Admitted for mild DKA.    IMPRESSION:  ANGIE with NAGMA  HO DM with diabetic neuropathy    #N/V/D likely secondary to mild compensated DKA  #HAGMA w/ metabolic alkalosis  #SIRS + on admission likely secondary to DKA (leukocytosis, tachycardia)  - WBC 13.92, K 3.4, lactate 2 -> 1.4, AG 21, BHB 1.6, glucose 306  - delta/delta > 9 (HAGMA from DKA, metabolic alkalosis from contraction)   - Chest xray Linear opacity overlies the right midlung field.  - CT AB/P No evidence of abdominal or pelvic mass or inflammatory process  - R/o infectious etiology   - A1c: 7.2   - C/w incentive spirometry  - LR 100cc/hr, zofran prn  - Increase Sq insulin regimen 15/6/6/6   - Monitor BMP   - clear liquid diet advance as tolerated   - F/u RVP  - F/u UA, Ucx   - F/u Bcx   - F/u D-dimer  - F/u procal   - F/u CK   - F/u mag and phos     #Mild hypoxia  - patient consistently 92-94% but patient denies SOB  - has significant smoking history, monitor off O2 and get o/p PFTs  - BNP 1609  - C/w incentive spirometry     #Hypertensive urgency  - did not take po meds in am due to N/V  - Increase amlodipine from 5mg to 10mg, lisinopril 40mg (benazepril)   - hold HCTZ 25mg qd due to contraction alkalosis     #CAD s/p stent 2008  #HLD  - toprol 100mg daily, atorvastatin 40mg hs    #Diabetic neuropathy  - c/w gabapentin    #Anxiety/depression  - c/w paxil 30mg daily     #Misc   - DVT prophylaxis: Lovenox  - GI prophylaxis: none  - Diet: clear liquid diet   - Code Status: full code  - Disposition: SDU     **pending medrec**         68yo M w/ pmhx cad s/p stent 2008, DM, HTN, HLD, presenting to the ED for evaluation of nausea, vomiting, weakness for the past two days. Admitted for mild DKA.    IMPRESSION:  ANGIE with NAGMA  HO DM with diabetic neuropathy    #N/V/D likely secondary to mild compensated DKA  #HAGMA w/ metabolic alkalosis  #SIRS + on admission likely secondary to DKA (leukocytosis, tachycardia)  - WBC 13.92, K 3.4, lactate 2 -> 1.4, AG 21, BHB 1.6, glucose 306  - delta/delta > 9 (HAGMA from DKA, metabolic alkalosis from contraction)   - Chest xray Linear opacity overlies the right midlung field.  - CT AB/P No evidence of abdominal or pelvic mass or inflammatory process  - R/o infectious etiology   - A1c: 7.2   - C/w incentive spirometry  - LR 100cc/hr, zofran prn  - Increase Sq insulin regimen 15/6/6/6   - Monitor BMP   - clear liquid diet advance as tolerated   - F/u RVP  - F/u UA, Ucx   - F/u Bcx   - F/u D-dimer  - F/u procal   - F/u CK   - F/u mag and phos     #Mild hypoxia  - patient consistently 92-94% but patient denies SOB  - has significant smoking history, monitor off O2 and get o/p PFTs  - BNP 1609  - C/w incentive spirometry     #Hypertensive urgency  - did not take po meds in am due to N/V  - Increase amlodipine from 5mg to 10mg, lisinopril 40mg (benazepril)   - hold HCTZ 25mg qd due to contraction alkalosis     #CAD s/p stent 2008  #HLD  - toprol 100mg daily, atorvastatin 40mg hs    #Diabetic neuropathy  - c/w gabapentin    #Anxiety/depression  - c/w paxil 30mg daily     #Misc   - DVT prophylaxis: Lovenox  - GI prophylaxis: none  - Diet: clear liquid diet   - Code Status: DNR/DNI  - Disposition: SDU     **pending medrec**         66yo M w/ pmhx cad s/p stent 2008, DM, HTN, HLD, presenting to the ED for evaluation of nausea, vomiting, weakness for the past two days. Admitted for mild DKA.    IMPRESSION:  ANGIE with NAGMA  HO DM with diabetic neuropathy    #N/V/D likely secondary to mild compensated DKA  #HAGMA w/ metabolic alkalosis  #SIRS + on admission likely secondary to DKA (leukocytosis, tachycardia)  - WBC 13.92, K 3.4, lactate 2 -> 1.4, AG 21, BHB 1.6, glucose 306  - delta/delta > 9 (HAGMA from DKA, metabolic alkalosis from contraction)   - Chest xray Linear opacity overlies the right midlung field.  - CT AB/P No evidence of abdominal or pelvic mass or inflammatory process  - R/o infectious etiology   - A1c: 7.2   - C/w incentive spirometry  - LR 100cc/hr, zofran prn  - Increase Sq insulin regimen 15/6/6/6   - Monitor BMP   - clear liquid diet advance as tolerated   - Started meningoencephalitis coverage with ampicillin, ceftriaxone, vancomycin and acyclovir   - D-dimer 208  -   - F/u RVP  - F/u UA, Ucx   - F/u Bcx   - F/u procal     #Mild hypoxia  - patient consistently 92-94% but patient denies SOB  - has significant smoking history, monitor off O2 and get o/p PFTs  - BNP 1609  - C/w incentive spirometry     #Hypertensive urgency  - did not take po meds in am due to N/V  - Increase amlodipine from 5mg to 10mg, lisinopril 40mg (benazepril)   - hold HCTZ 25mg qd due to contraction alkalosis     #CAD s/p stent 2008  #HLD  - Takes Metoprolol succinate 50mg BID, was confirmed with mail order pharmacy.  - atorvastatin 40mg hs    #Diabetic neuropathy  - c/w gabapentin    #Anxiety/depression  - c/w paxil 30mg daily     #Misc   - DVT prophylaxis: Lovenox  - GI prophylaxis: PPI  - Diet: clear liquid diet   - Code Status: DNR/DNI  - Disposition: Upgrade to MICU     **Med recs confirmed**

## 2022-04-19 NOTE — CHART NOTE - NSCHARTNOTEFT_GEN_A_CORE
SDU Transfer Note    Transfer from: SDU   Transfer to: MICU   Accepting physican:       SDU COURSE:  66yo M w/ pmhx cad s/p stent 2008, DM, HTN, HLD, presenting to the ED for evaluation of nausea, vomiting, weakness for the past two days. Pt reports yesterday had few episodes of NBNB vomiting. Patient states today he was very weak and unable to tolerate po which prompted him to come to ED. Also reports watery diarrhea without hematochezia. Denies any sick contacts. Denies fever, chills, CP, SOB, coughing, dysuria. Reports compliance with meds including his diabetes meds but was not able to take them due to N/V.   ED course:   vitals: /109, , T 96.4F, O2 92% RA -> O2 96% on 2L NC  labs: WBC 13.92, K 3.4, lactate 2 -> 1.4, AG 21, BHB 1.6, glucose 306  imaging:   - CT a/p IV con: Colonic diverticula noted with no evidence of diverticulitis. No evidence of bowel obstruction, colitis, inflammatory process, or ascites. No pneumoperitoneum.   - CXR wet read: cardiomegaly w/ b/l opacities?  given: NS 3L bolus total, KCL 50meq total, tylenol, zofran    Patient was started on subq insulin for now and admitted to SDU     SDU: patient remains weak and lethargic. Patient is not tolerating PO intake. 500cc bolus was given and sq insuline increased. Panculture sent to r/o infectious etiology. Patient is currently being treated for meningoencephalitis empirically. GOC was done at bedside with patient and his son. Patient wishes to be DNR/DNI. MOLST signed.   Patient is being upgraded to MICU for closer monitoring     ASSESMENT AND PLAN:     66yo M w/ pmhx cad s/p stent 2008, DM, HTN, HLD, presenting to the ED for evaluation of nausea, vomiting, weakness for the past two days. Admitted for mild DKA.    IMPRESSION:  HAGMA with Metabolic alkalosis   Respiratory alkalosis   HO DM with diabetic neuropathy  Mild DKA   AMS     #N/V/D likely secondary to mild compensated DKA  #HAGMA w/ metabolic alkalosis  #SIRS + on admission likely secondary to DKA (leukocytosis, tachycardia)  - WBC 13.92, K 3.4, lactate 2 -> 1.4, AG 21, BHB 1.6, glucose 306  - delta/delta > 9 (HAGMA from DKA, metabolic alkalosis from contraction)   - Chest xray Linear opacity overlies the right midlung field.  - CT AB/P No evidence of abdominal or pelvic mass or inflammatory process  - R/o infectious etiology   - A1c: 7.2   - C/w incentive spirometry  - LR 100cc/hr, zofran prn  - Increase Sq insulin regimen 15/6/6/6   - Monitor BMP   - clear liquid diet advance as tolerated   - Started meningoencephalitis coverage with ampicillin, ceftriaxone, vancomycin and acyclovir   - D-dimer 208  -   - F/u RVP  - F/u UA, Ucx   - F/u Bcx   - F/u procal     #Mild hypoxia  - patient consistently 92-94% but patient denies SOB  - has significant smoking history, monitor off O2 and get o/p PFTs  - BNP 1609  - C/w incentive spirometry     #Hypertensive urgency  - did not take po meds in am due to N/V  - Increase amlodipine from 5mg to 10mg, lisinopril 40mg (benazepril)   - hold HCTZ 25mg qd due to contraction alkalosis     #CAD s/p stent 2008  #HLD  - Takes Metoprolol succinate 50mg BID, was confirmed with mail order pharmacy.  - atorvastatin 40mg hs    #Diabetic neuropathy  - c/w gabapentin    #Anxiety/depression  - c/w paxil 30mg daily     #Misc   - DVT prophylaxis: Lovenox  - GI prophylaxis: PPI  - Diet: clear liquid diet   - Code Status: DNR/DNI  - Disposition: Upgrade to MICU       For Follow-Up:  - Panculture   - Procalcitonin       Vital Signs Last 24 Hrs  T(C): 36.9 (19 Apr 2022 11:57), Max: 37.7 (18 Apr 2022 18:35)  T(F): 98.4 (19 Apr 2022 11:57), Max: 99.8 (18 Apr 2022 18:35)  HR: 117 (19 Apr 2022 11:57) (110 - 125)  BP: 128/60 (19 Apr 2022 11:57) (128/60 - 205/109)  BP(mean): 86 (19 Apr 2022 11:57) (86 - 117)  RR: 18 (19 Apr 2022 11:57) (18 - 20)  SpO2: 94% (19 Apr 2022 04:00) (90% - 97%)  I&O's Summary        MEDICATIONS  (STANDING):  acyclovir IVPB      acyclovir IVPB 1050 milliGRAM(s) IV Intermittent once  acyclovir IVPB 1050 milliGRAM(s) IV Intermittent every 8 hours  ampicillin  IVPB 1 Gram(s) IV Intermittent once  ampicillin  IVPB 1 Gram(s) IV Intermittent every 6 hours  ampicillin  IVPB      atorvastatin 40 milliGRAM(s) Oral at bedtime  cefTRIAXone   IVPB 2000 milliGRAM(s) IV Intermittent every 12 hours  chlorhexidine 4% Liquid 1 Application(s) Topical <User Schedule>  dextrose 5%. 1000 milliLiter(s) (50 mL/Hr) IV Continuous <Continuous>  dextrose 5%. 1000 milliLiter(s) (100 mL/Hr) IV Continuous <Continuous>  dextrose 50% Injectable 25 Gram(s) IV Push once  dextrose 50% Injectable 12.5 Gram(s) IV Push once  dextrose 50% Injectable 25 Gram(s) IV Push once  enoxaparin Injectable 40 milliGRAM(s) SubCutaneous every 24 hours  glucagon  Injectable 1 milliGRAM(s) IntraMuscular once  insulin glargine Injectable (LANTUS) 15 Unit(s) SubCutaneous at bedtime  insulin lispro (ADMELOG) corrective regimen sliding scale   SubCutaneous three times a day before meals  insulin lispro Injectable (ADMELOG) 6 Unit(s) SubCutaneous Before meals and at bedtime  lactated ringers. 1000 milliLiter(s) (100 mL/Hr) IV Continuous <Continuous>  lisinopril 40 milliGRAM(s) Oral daily  magnesium sulfate  IVPB 2 Gram(s) IV Intermittent once  metoprolol succinate ER 50 milliGRAM(s) Oral daily  pantoprazole    Tablet 40 milliGRAM(s) Oral before breakfast  PARoxetine 30 milliGRAM(s) Oral daily  vancomycin  IVPB 1500 milliGRAM(s) IV Intermittent every 12 hours    MEDICATIONS  (PRN):  acetaminophen     Tablet .. 650 milliGRAM(s) Oral every 6 hours PRN Temp greater or equal to 38C (100.4F), Mild Pain (1 - 3)  aluminum hydroxide/magnesium hydroxide/simethicone Suspension 30 milliLiter(s) Oral every 4 hours PRN Dyspepsia  dextrose Oral Gel 15 Gram(s) Oral once PRN Blood Glucose LESS THAN 70 milliGRAM(s)/deciliter  ondansetron Injectable 4 milliGRAM(s) IV Push every 8 hours PRN Nausea and/or Vomiting        LABS                                            11.4                  Neurophils% (auto):   85.5   (04-19 @ 04:30):    12.11)-----------(189          Lymphocytes% (auto):  5.7                                           32.3                   Eosinphils% (auto):   0.0      Manual%: Neutrophils x    ; Lymphocytes x    ; Eosinophils x    ; Bands%: x    ; Blasts x                                    x      |  x      |  x                   Calcium: x     / iCa: x      (04-19 @ 11:00)    ----------------------------<  x         Magnesium: 1.3                              x       |  x      |  x                Phosphorous: 2.1      TPro  6.7    /  Alb  4.1    /  TBili  1.1    /  DBili  x      /  AST  12     /  ALT  10     /  AlkPhos  81     19 Apr 2022 04:30 SDU Transfer Note    Transfer from: SDU   Transfer to: MICU   Accepting physican:       SDU COURSE:  68yo M w/ pmhx cad s/p stent 2008, DM, HTN, HLD, presenting to the ED for evaluation of nausea, vomiting, weakness for the past two days. Pt reports yesterday had few episodes of NBNB vomiting. Patient states today he was very weak and unable to tolerate po which prompted him to come to ED. Also reports watery diarrhea without hematochezia. Denies any sick contacts. Denies fever, chills, CP, SOB, coughing, dysuria. Reports compliance with meds including his diabetes meds but was not able to take them due to N/V.   ED course:   vitals: /109, , T 96.4F, O2 92% RA -> O2 96% on 2L NC  labs: WBC 13.92, K 3.4, lactate 2 -> 1.4, AG 21, BHB 1.6, glucose 306  imaging:   - CT a/p IV con: Colonic diverticula noted with no evidence of diverticulitis. No evidence of bowel obstruction, colitis, inflammatory process, or ascites. No pneumoperitoneum.   - CXR wet read: cardiomegaly w/ b/l opacities?  given: NS 3L bolus total, KCL 50meq total, tylenol, zofran    Patient was started on subq insulin for now and admitted to SDU     SDU: patient remains weak and lethargic. Patient is not tolerating PO intake. 500cc bolus was given and sq insuline increased. Panculture sent to r/o infectious etiology. Patient is currently being treated for meningoencephalitis empirically. GOC was done at bedside with patient and his son. Patient wishes to be DNR/DNI. MOLST signed.   Patient is being upgraded to MICU for closer monitoring.   CT H ordered before performing LP, Coags sent.     ASSESMENT AND PLAN:     68yo M w/ pmhx cad s/p stent 2008, DM, HTN, HLD, presenting to the ED for evaluation of nausea, vomiting, weakness for the past two days. Admitted for mild DKA.    IMPRESSION:  HAGMA with Metabolic alkalosis   Respiratory alkalosis   HO DM with diabetic neuropathy  Mild DKA   AMS     #N/V/D likely secondary to mild compensated DKA  #HAGMA w/ metabolic alkalosis  #SIRS + on admission likely secondary to DKA (leukocytosis, tachycardia)  - WBC 13.92, K 3.4, lactate 2 -> 1.4, AG 21, BHB 1.6, glucose 306  - delta/delta > 9 (HAGMA from DKA, metabolic alkalosis from contraction)   - Chest xray Linear opacity overlies the right midlung field.  - CT AB/P No evidence of abdominal or pelvic mass or inflammatory process  - R/o infectious etiology   - A1c: 7.2   - C/w incentive spirometry  - LR 100cc/hr, zofran prn  - Increase Sq insulin regimen 15/6/6/6   - Monitor BMP   - clear liquid diet advance as tolerated   - Started meningoencephalitis coverage with ampicillin, ceftriaxone, vancomycin and acyclovir   - D-dimer 208  -   - F/u RVP  - F/u UA, Ucx   - F/u Bcx   - F/u procal   - F/u CT head, pending LP afterwards   - Coags sent, f/u before LP     #Mild hypoxia  - patient consistently 92-94% but patient denies SOB  - has significant smoking history, monitor off O2 and get o/p PFTs  - BNP 1609  - C/w incentive spirometry     #Hypertensive urgency  - did not take po meds in am due to N/V  - Increase amlodipine from 5mg to 10mg, lisinopril 40mg (benazepril)   - hold HCTZ 25mg qd due to contraction alkalosis     #CAD s/p stent 2008  #HLD  - Takes Metoprolol succinate 50mg BID, was confirmed with mail order pharmacy.  - atorvastatin 40mg hs    #Diabetic neuropathy  - c/w gabapentin    #Anxiety/depression  - c/w paxil 30mg daily     #Misc   - DVT prophylaxis: Lovenox  - GI prophylaxis: PPI  - Diet: clear liquid diet   - Code Status: DNR/DNI  - Disposition: Upgrade to MICU       For Follow-Up:  - Panculture   - Procalcitonin   - F/u to UA, Ucx  - F/u CT head, pending LP afterwards   - Coags sent, f/u before LP         Vital Signs Last 24 Hrs  T(C): 36.9 (19 Apr 2022 11:57), Max: 37.7 (18 Apr 2022 18:35)  T(F): 98.4 (19 Apr 2022 11:57), Max: 99.8 (18 Apr 2022 18:35)  HR: 117 (19 Apr 2022 11:57) (110 - 125)  BP: 128/60 (19 Apr 2022 11:57) (128/60 - 205/109)  BP(mean): 86 (19 Apr 2022 11:57) (86 - 117)  RR: 18 (19 Apr 2022 11:57) (18 - 20)  SpO2: 94% (19 Apr 2022 04:00) (90% - 97%)  I&O's Summary        MEDICATIONS  (STANDING):  acyclovir IVPB      acyclovir IVPB 1050 milliGRAM(s) IV Intermittent once  acyclovir IVPB 1050 milliGRAM(s) IV Intermittent every 8 hours  ampicillin  IVPB 1 Gram(s) IV Intermittent once  ampicillin  IVPB 1 Gram(s) IV Intermittent every 6 hours  ampicillin  IVPB      atorvastatin 40 milliGRAM(s) Oral at bedtime  cefTRIAXone   IVPB 2000 milliGRAM(s) IV Intermittent every 12 hours  chlorhexidine 4% Liquid 1 Application(s) Topical <User Schedule>  dextrose 5%. 1000 milliLiter(s) (50 mL/Hr) IV Continuous <Continuous>  dextrose 5%. 1000 milliLiter(s) (100 mL/Hr) IV Continuous <Continuous>  dextrose 50% Injectable 25 Gram(s) IV Push once  dextrose 50% Injectable 12.5 Gram(s) IV Push once  dextrose 50% Injectable 25 Gram(s) IV Push once  enoxaparin Injectable 40 milliGRAM(s) SubCutaneous every 24 hours  glucagon  Injectable 1 milliGRAM(s) IntraMuscular once  insulin glargine Injectable (LANTUS) 15 Unit(s) SubCutaneous at bedtime  insulin lispro (ADMELOG) corrective regimen sliding scale   SubCutaneous three times a day before meals  insulin lispro Injectable (ADMELOG) 6 Unit(s) SubCutaneous Before meals and at bedtime  lactated ringers. 1000 milliLiter(s) (100 mL/Hr) IV Continuous <Continuous>  lisinopril 40 milliGRAM(s) Oral daily  magnesium sulfate  IVPB 2 Gram(s) IV Intermittent once  metoprolol succinate ER 50 milliGRAM(s) Oral daily  pantoprazole    Tablet 40 milliGRAM(s) Oral before breakfast  PARoxetine 30 milliGRAM(s) Oral daily  vancomycin  IVPB 1500 milliGRAM(s) IV Intermittent every 12 hours    MEDICATIONS  (PRN):  acetaminophen     Tablet .. 650 milliGRAM(s) Oral every 6 hours PRN Temp greater or equal to 38C (100.4F), Mild Pain (1 - 3)  aluminum hydroxide/magnesium hydroxide/simethicone Suspension 30 milliLiter(s) Oral every 4 hours PRN Dyspepsia  dextrose Oral Gel 15 Gram(s) Oral once PRN Blood Glucose LESS THAN 70 milliGRAM(s)/deciliter  ondansetron Injectable 4 milliGRAM(s) IV Push every 8 hours PRN Nausea and/or Vomiting        LABS                                            11.4                  Neurophils% (auto):   85.5   (04-19 @ 04:30):    12.11)-----------(189          Lymphocytes% (auto):  5.7                                           32.3                   Eosinphils% (auto):   0.0      Manual%: Neutrophils x    ; Lymphocytes x    ; Eosinophils x    ; Bands%: x    ; Blasts x                                    x      |  x      |  x                   Calcium: x     / iCa: x      (04-19 @ 11:00)    ----------------------------<  x         Magnesium: 1.3                              x       |  x      |  x                Phosphorous: 2.1      TPro  6.7    /  Alb  4.1    /  TBili  1.1    /  DBili  x      /  AST  12     /  ALT  10     /  AlkPhos  81     19 Apr 2022 04:30

## 2022-04-19 NOTE — DISCHARGE NOTE NURSING/CASE MANAGEMENT/SOCIAL WORK - NSDCPEFALRISK_GEN_ALL_CORE
For information on Fall & Injury Prevention, visit: [email or web address] OR  [email or web address] OR  https://www.cdc.gov/steadi/patient.html

## 2022-04-19 NOTE — DISCHARGE NOTE NURSING/CASE MANAGEMENT/SOCIAL WORK - PATIENT PORTAL LINK FT
You can access the FollowMyHealth Patient Portal offered by [facility] by registering at the following website: [email or web address]. By joining [employer]’s FollowMyHealth portal, you will also be able to view your health information using other applications (apps) compatible with our system.

## 2022-04-19 NOTE — CHART NOTE - NSCHARTNOTEFT_GEN_A_CORE
Patient asses upon arrival to ICU. Patient wasc tachycardic to 130s sating in high 80s on 4L. Was placed on Venti mask. ABG was drawn. Spoke with Dr. Moore, decision was made to place on High flow. D-dimer, procal, troponin, cbc, cmp sent stat. CXR, VA duplex, EKG ordered. Will hold off on LP.

## 2022-04-19 NOTE — CONSULT NOTE ADULT - SUBJECTIVE AND OBJECTIVE BOX
Patient is a 67y old  Male who presents with a chief complaint of hyperglycemia (19 Apr 2022 07:43)      HPI:  66yo M w/ pmhx cad s/p stent 2008, DM, HTN, HLD, presenting to the ED for evaluation of nausea, vomiting, weakness for the past two days. Pt reports yesterday had few episodes of NBNB vomitin. Patientt states today he was very weak and unable to tolerate po which prompted him to come to ED. Also reports watery diarrhea without hematochezia. Denies any sick contacts. Denies fever, chills, CP, SOB, coughing, dysuria. Reports compliance with meds including his diabetes meds but was not able to take them today due to N/V.     ED course:   vitals: /109, , T 96.4F, O2 92% RA -> O2 96% on 2L NC  labs: WBC 13.92, K 3.4, lactate 2 -> 1.4, AG 21, BHB 1.6, glucose 306  imaging:   - CT a/p IV con: Colonic diverticula noted with no evidence of diverticulitis. No evidence of bowel obstruction, colitis, inflammatory process, or ascites. No pneumoperitoneum.   - CXR wet read: cardiomegaly w/ b/l opacities?  given: NS 2L bolus total, KCL 50meq total, tylenol, zofran  Admitted to stepdown and said to monitor on subq insulin for now (18 Apr 2022 23:27)      PAST MEDICAL & SURGICAL HISTORY:  CAD S/P percutaneous coronary angioplasty    HTN (hypertension)    Diabetes mellitus    Hyperlipidemia    Anxiety    No significant past surgical history        Occupational hx:     Social hx: ex smoker smoked 4 ppd for 40 years quit in 2008.    FAMILY HISTORY:  No pertinent family history in first degree relatives    :  No known cardiovacular family hisotry     ROS:  See HPI     Allergies    No Known Allergies    Intolerances          PHYSICAL EXAM    ICU Vital Signs Last 24 Hrs  T(C): 36.8 (19 Apr 2022 08:25), Max: 37.7 (18 Apr 2022 18:35)  T(F): 98.2 (19 Apr 2022 08:25), Max: 99.8 (18 Apr 2022 18:35)  HR: 116 (19 Apr 2022 08:25) (110 - 125)  BP: 175/81 (19 Apr 2022 08:25) (157/74 - 205/109)  BP(mean): 117 (19 Apr 2022 08:25) (117 - 117)  ABP: --  ABP(mean): --  RR: 18 (19 Apr 2022 08:25) (18 - 20)  SpO2: 94% (19 Apr 2022 04:00) (90% - 97%)      CONSTITUTIONAL:  In NAD    ENT:   Airway patent,   No thrush    EYES:   Clear bilaterally,   pupils equal,   round and reactive to light.    CARDIAC:   tachyardic,   regular rhythm.    no edema      CAROTID:   normal systolic impulse  no bruits    RESPIRATORY:   No wheezing  Normal chest expansion  + tachypneic,  No use of accessory muscles    GASTROINTESTINAL:  Abdomen soft,   non-tender,   no guarding,   + BS    MUSCULOSKELETAL:   range of motion is not limited,  no clubbing, cyanosis    NEUROLOGICAL:   Alert and oriented   no motor deficits.    SKIN:   Skin normal color for race,   No evidence of rash.    PSYCHIATRIC:   normal mood and affect.   no apparent risk to self or others.        LABS:                          12.8   13.92 )-----------( 194      ( 18 Apr 2022 18:32 )             35.7                                               04-18    135  |  91<L>  |  13  ----------------------------<  306<H>  3.4<L>   |  23  |  1.1    Ca    9.4      18 Apr 2022 18:32    TPro  7.6  /  Alb  4.5  /  TBili  1.2  /  DBili  x   /  AST  10  /  ALT  9   /  AlkPhos  87  04-18                                                                                           LIVER FUNCTIONS - ( 18 Apr 2022 18:32 )  Alb: 4.5 g/dL / Pro: 7.6 g/dL / ALK PHOS: 87 U/L / ALT: 9 U/L / AST: 10 U/L / GGT: x                                                                                                                                       X-Rays                                                                                     ECHO    MEDICATIONS  (STANDING):  amLODIPine   Tablet 5 milliGRAM(s) Oral daily  atorvastatin 40 milliGRAM(s) Oral at bedtime  chlorhexidine 4% Liquid 1 Application(s) Topical <User Schedule>  dextrose 5%. 1000 milliLiter(s) (50 mL/Hr) IV Continuous <Continuous>  dextrose 5%. 1000 milliLiter(s) (100 mL/Hr) IV Continuous <Continuous>  dextrose 50% Injectable 25 Gram(s) IV Push once  dextrose 50% Injectable 12.5 Gram(s) IV Push once  dextrose 50% Injectable 25 Gram(s) IV Push once  enoxaparin Injectable 40 milliGRAM(s) SubCutaneous every 24 hours  glucagon  Injectable 1 milliGRAM(s) IntraMuscular once  insulin glargine Injectable (LANTUS) 10 Unit(s) SubCutaneous at bedtime  insulin lispro (ADMELOG) corrective regimen sliding scale   SubCutaneous three times a day before meals  insulin lispro Injectable (ADMELOG) 4 Unit(s) SubCutaneous Before meals and at bedtime  lactated ringers. 1000 milliLiter(s) (75 mL/Hr) IV Continuous <Continuous>  lisinopril 40 milliGRAM(s) Oral daily  metoprolol succinate ER 50 milliGRAM(s) Oral daily  PARoxetine 30 milliGRAM(s) Oral daily    MEDICATIONS  (PRN):  acetaminophen     Tablet .. 650 milliGRAM(s) Oral every 6 hours PRN Temp greater or equal to 38C (100.4F), Mild Pain (1 - 3)  aluminum hydroxide/magnesium hydroxide/simethicone Suspension 30 milliLiter(s) Oral every 4 hours PRN Dyspepsia  dextrose Oral Gel 15 Gram(s) Oral once PRN Blood Glucose LESS THAN 70 milliGRAM(s)/deciliter  ondansetron Injectable 4 milliGRAM(s) IV Push every 8 hours PRN Nausea and/or Vomiting

## 2022-04-19 NOTE — PROGRESS NOTE ADULT - SUBJECTIVE AND OBJECTIVE BOX
----------Daily Progress Note----------    HISTORY OF PRESENT ILLNESS:  Patient is a 67y old Male who presents with a chief complaint of hyperglycemia (18 Apr 2022 23:27)    Currently admitted to medicine with the primary diagnosis of Nausea and vomiting    66yo M w/ pmhx cad s/p stent 2008, DM, HTN, HLD, presenting to the ED for evaluation of nausea, vomiting, weakness for the past two days. Pt reports yesterday had few episodes of NBNB vomiting. Patient states today he was very weak and unable to tolerate po which prompted him to come to ED. Also reports watery diarrhea without hematochezia. Denies any sick contacts. Denies fever, chills, CP, SOB, coughing, dysuria. Reports compliance with meds including his diabetes meds but was not able to take them today due to N/V.     ED course:   vitals: /109, , T 96.4F, O2 92% RA -> O2 96% on 2L NC  labs: WBC 13.92, K 3.4, lactate 2 -> 1.4, AG 21, BHB 1.6, glucose 306  imaging:   - CT a/p IV con: Colonic diverticula noted with no evidence of diverticulitis. No evidence of bowel obstruction, colitis, inflammatory process, or ascites. No pneumoperitoneum.   - CXR wet read: cardiomegaly w/ b/l opacities?  given: NS 2L bolus total, KCL 50meq total, tylenol, zofran  progress: MAR spoke to ICU regarding possible insulin drip for DKA, ICU rejected but admitted to stepdown and said to monitor on subq insulin for now     Today is hospital day 1d.     INTERVAL HOSPITAL COURSE / OVERNIGHT EVENTS:    Patient was examined and seen at bedside. This morning he is resting comfortably in bed and reports no new issues or overnight events.     Review of Systems: Patient is endorsing feeling weak. Patient denies any fever, chills, headache, dizziness. Patient denies chest pain, palpitation. Patient denies abdominal pain, endorsing nausea.     <<<<<PAST MEDICAL & SURGICAL HISTORY>>>>>  CAD S/P percutaneous coronary angioplasty    HTN (hypertension)    Diabetes mellitus    Hyperlipidemia    Anxiety    No significant past surgical history      ALLERGIES  No Known Allergies      Home Medications:  amLODIPine 5 mg oral tablet: 1 tab(s) orally once a day (19 Apr 2022 01:00)  atorvastatin 40 mg oral tablet: 1 tab(s) orally once a day (19 Apr 2022 01:00)  benazepril 40 mg oral tablet: 1 tab(s) orally once a day (19 Apr 2022 01:00)  glimepiride 4 mg oral tablet: 1 tab(s) orally once a day (19 Apr 2022 01:00)  hydroCHLOROthiazide 25 mg oral tablet: 1 tab(s) orally once a day (19 Apr 2022 01:00)  metFORMIN 500 mg oral tablet: 1 tab(s) orally 2 times a day (19 Apr 2022 01:00)  Paxil 30 mg oral tablet: 1 tab(s) orally once a day (19 Apr 2022 01:00)  pioglitazone 45 mg oral tablet: 1 tab(s) orally once a day (19 Apr 2022 01:00)  Toprol-XL 50 mg oral tablet, extended release: 1 tab(s) orally once a day (19 Apr 2022 01:00)        MEDICATIONS  STANDING MEDICATIONS  amLODIPine   Tablet 5 milliGRAM(s) Oral daily  atorvastatin 40 milliGRAM(s) Oral at bedtime  chlorhexidine 4% Liquid 1 Application(s) Topical <User Schedule>  dextrose 5%. 1000 milliLiter(s) IV Continuous <Continuous>  dextrose 5%. 1000 milliLiter(s) IV Continuous <Continuous>  dextrose 50% Injectable 25 Gram(s) IV Push once  dextrose 50% Injectable 12.5 Gram(s) IV Push once  dextrose 50% Injectable 25 Gram(s) IV Push once  enoxaparin Injectable 40 milliGRAM(s) SubCutaneous every 24 hours  glucagon  Injectable 1 milliGRAM(s) IntraMuscular once  insulin glargine Injectable (LANTUS) 10 Unit(s) SubCutaneous at bedtime  insulin lispro (ADMELOG) corrective regimen sliding scale   SubCutaneous three times a day before meals  insulin lispro Injectable (ADMELOG) 4 Unit(s) SubCutaneous Before meals and at bedtime  lactated ringers. 1000 milliLiter(s) IV Continuous <Continuous>  lisinopril 40 milliGRAM(s) Oral daily  metoprolol succinate ER 50 milliGRAM(s) Oral daily  PARoxetine 30 milliGRAM(s) Oral daily    PRN MEDICATIONS  acetaminophen     Tablet .. 650 milliGRAM(s) Oral every 6 hours PRN  aluminum hydroxide/magnesium hydroxide/simethicone Suspension 30 milliLiter(s) Oral every 4 hours PRN  dextrose Oral Gel 15 Gram(s) Oral once PRN  ondansetron Injectable 4 milliGRAM(s) IV Push every 8 hours PRN    VITALS:  T(F): 98.2  HR: 119  BP: 164/69  RR: 18  SpO2: 94%    <<<<<LABS>>>>>                        12.8   13.92 )-----------( 194      ( 18 Apr 2022 18:32 )             35.7     04-18    135  |  91<L>  |  13  ----------------------------<  306<H>  3.4<L>   |  23  |  1.1    Ca    9.4      18 Apr 2022 18:32    TPro  7.6  /  Alb  4.5  /  TBili  1.2  /  DBili  x   /  AST  10  /  ALT  9   /  AlkPhos  87  04-18          Lactate, Blood: 2.0 mmol/L (04-18-22 @ 18:32)    [id number]        <<<<<RADIOLOGY>>>>>  < from: Xray Chest 1 View- PORTABLE-Urgent (04.18.22 @ 19:48) >    Impression:    Low lung volume.    Linear opacity overlies the right midlung field.    --- End of Report ---    < end of copied text >    < from: CT Abdomen and Pelvis w/ IV Cont (04.18.22 @ 18:30) >    IMPRESSION:    No evidence of abdominal or pelvic mass or inflammatory process    --- End of Report ---    < end of copied text >      <<<<<PHYSICAL EXAM>>>>>  GENERAL: Well developed, well nourished and in no acute distress. Resting comfortably in bed.  PULMONARY: Clear to auscultation bilaterally. No rales, rhonchi, or wheezing.  CARDIOVASCULAR: Regular rate and rhythm, S1-S2, no murmurs  GASTROINTESTINAL: Soft, non-tender, non-distended, no guarding.  SKIN/EXTREMITIES: No clubbing or edema  NEUROLOGIC/MUSCULOSKELETAL: AOx4, grossly moving all extremities, no focal deficits.      -----------------------------------------------------------------------------------------------------------------------------------------------------------------------------------------------

## 2022-04-19 NOTE — PATIENT PROFILE ADULT - FALL HARM RISK - HARM RISK INTERVENTIONS

## 2022-04-19 NOTE — CONSULT NOTE ADULT - ASSESSMENT
IMPRESSION:    HAGMA with NAGMA  HO DM with diabetic neuropathy      PLAN:    CNS: Avoid CNS depressants.    HEENT: Oral care    PULMONARY:  HOB @ 45 degrees. Incentive spirometry.    CARDIOVASCULAR: LR bolus 500 cc. increase LR to 100 cc/hr.     GI: GI prophylaxis.  Feeding.     RENAL:  Follow up lytes.  Correct as needed. Repeat PM BMP, iPh and Mg. Check CK.    INFECTIOUS DISEASE: Follow up cultures. Dimer.    HEMATOLOGICAL:  DVT prophylaxis. dimer.    ENDOCRINE:  Follow up FS.  Insulin basal/bolus.    MUSCULOSKELETAL: OOBTC.    SDU         IMPRESSION:    HAGMA with Metabolic alkalosis   Respiratory alkalosis   HO DM with diabetic neuropathy  Mild DKA     PLAN:    CNS: Avoid CNS depressants.    HEENT: Oral care    PULMONARY:  HOB @ 45 degrees. Incentive spirometry.    CARDIOVASCULAR: LR bolus 500 cc. Goal directed fluid resuscitation.       GI: GI prophylaxis.  Feeding if tolerated .     RENAL:  Follow up lytes stat.  Correct as needed. Repeat PM BMP, iPh and Mg. Check CK.      INFECTIOUS DISEASE: Follow up cultures. Procal.  Pan cultures.  Start Rocephin     HEMATOLOGICAL:  DVT prophylaxis. dimer.    ENDOCRINE:  Follow up FS. Insulin drip.  Hold Metformin     MUSCULOSKELETAL: Bed rest     SDU might need ICU          IMPRESSION:    HAGMA with Metabolic alkalosis   Respiratory alkalosis   HO DM with diabetic neuropathy  Mild DKA     PLAN:    CNS: Avoid CNS depressants.  CTH stat.  LP     HEENT: Oral care    PULMONARY:  HOB @ 45 degrees. Incentive spirometry.    CARDIOVASCULAR: LR bolus 500 cc. Goal directed fluid resuscitation.       GI: GI prophylaxis.  Feeding if tolerated .     RENAL:  Follow up lytes stat.  Correct as needed. Repeat PM BMP, iPh and Mg. Check CK.      INFECTIOUS DISEASE: Follow up cultures. Procal.  Pan cultures.  Start meningoencephalitis coverage for now.      HEMATOLOGICAL:  DVT prophylaxis. dimer.    ENDOCRINE:  Follow up FS. Insulin drip.  Hold Metformin     MUSCULOSKELETAL: Bed rest     ICU          IMPRESSION:    HAGMA with Metabolic alkalosis   Respiratory alkalosis   HO DM with diabetic neuropathy  Mild DKA   AMS     PLAN:    CNS: Avoid CNS depressants.  CTH stat.  LP     HEENT: Oral care    PULMONARY:  HOB @ 45 degrees. Incentive spirometry.    CARDIOVASCULAR: LR bolus 500 cc. Goal directed fluid resuscitation.       GI: GI prophylaxis.  Feeding if tolerated .     RENAL:  Follow up lytes stat.  Correct as needed. Repeat PM BMP, iPh and Mg. Check CK.      INFECTIOUS DISEASE: Follow up cultures. Procal.  Pan cultures.  Start meningoencephalitis coverage for now.      HEMATOLOGICAL:  DVT prophylaxis. dimer.    ENDOCRINE:  Follow up FS. Insulin drip.  Hold Metformin     MUSCULOSKELETAL: Bed rest     ICU

## 2022-04-19 NOTE — PATIENT PROFILE ADULT - FUNCTIONAL ASSESSMENT - DAILY ACTIVITY SCORE.
Subjective:      Patient ID: Alicia Soliz is a 45 y.o. female.    Chief Complaint: Obesity      Patient discouraged about inability to lose weight. Wanting to try to have revision of her bariatric surgery, needing further workup to qualify - has never had sleep study secondary to not being covered by her insurance, do suspect FLOYD is probable will try to order again. She does have snoring, hypersomnia during the day. She does have prediabetes - due for repeat labs - and fatty liver diagnosed on liver US last year.   Blood pressure not controlled.    Review of Systems   Constitutional: Positive for unexpected weight change. Negative for activity change.   HENT: Negative for hearing loss, rhinorrhea and trouble swallowing.    Eyes: Negative for discharge and visual disturbance.   Respiratory: Negative for chest tightness and wheezing.    Cardiovascular: Negative for chest pain and palpitations.   Gastrointestinal: Negative for blood in stool, constipation, diarrhea and vomiting.   Endocrine: Negative for polydipsia and polyuria.   Genitourinary: Negative for difficulty urinating, dysuria, hematuria and menstrual problem.   Musculoskeletal: Positive for arthralgias. Negative for joint swelling and neck pain.   Neurological: Positive for weakness. Negative for headaches.   Psychiatric/Behavioral: Positive for dysphoric mood. Negative for confusion.     Past Medical History:   Diagnosis Date    Anemia     Arthritis     Cardiac arrest as complication of care     pt states she went into cardiac arrest from an allergic reaction to a medication    Depression     Encounter for blood transfusion     Hemiplegia due to old stroke     Hypertension     Morbid obesity with BMI of 45.0-49.9, adult 9/20/2018    Multiple sclerosis           Past Surgical History:   Procedure Laterality Date    APPENDECTOMY      CHOLECYSTECTOMY      COLONOSCOPY N/A 11/25/2020    Procedure: COLONOSCOPY;  Surgeon: Andrew Jenkins III,  MD;  Location: [facility] ENDO;  Service: Endoscopy;  Laterality: N/A;    ESOPHAGOGASTRODUODENOSCOPY N/A 2/19/2020    Procedure: EGD (ESOPHAGOGASTRODUODENOSCOPY);  Surgeon: Michael Navarrete MD;  Location: [facility] ENDO;  Service: Endoscopy;  Laterality: N/A;    ESOPHAGOGASTRODUODENOSCOPY N/A 2/20/2020    Procedure: EGD (ESOPHAGOGASTRODUODENOSCOPY);  Surgeon: Michael Navarrete MD;  Location: [facility] OR;  Service: General;  Laterality: N/A;    ESOPHAGOGASTRODUODENOSCOPY N/A 11/25/2020    Procedure: EGD (ESOPHAGOGASTRODUODENOSCOPY);  Surgeon: Andrew Jenkins III, MD;  Location: [facility] ENDO;  Service: Endoscopy;  Laterality: N/A;    HYSTERECTOMY      KNEE SURGERY      ROBOT-ASSISTED LAPAROSCOPIC SLEEVE GASTRECTOMY USING DA ANTHONY XI N/A 2/20/2020    Procedure: XI ROBOTIC SLEEVE GASTRECTOMY;  Surgeon: Michael Navarrete MD;  Location: [facility] OR;  Service: General;  Laterality: N/A;    TENOPLASTY OF HAND Left 8/26/2021    Procedure: REPAIR, TENDON, HAND;  Surgeon: Joselito Lugo MD;  Location: [facility] OR;  Service: Orthopedics;  Laterality: Left;  Left RCL PIP Joint Repair/Recon with Arthrex Internal Brace.    TONSILLECTOMY      TUBAL LIGATION       Family History   Problem Relation Age of Onset    Lupus Mother     Heart disease Mother     Hypertension Mother     Diabetes Father     Kidney disease Father     Cancer Maternal Aunt 40        breast    Breast cancer Maternal Aunt     Diabetes Maternal Aunt     COPD Maternal Aunt     Breast cancer Maternal Cousin     Ovarian cancer Maternal Cousin      Social History     Socioeconomic History    Marital status: Single    Number of children: 3   Occupational History     Employer: [employer]   Tobacco Use    Smoking status: Never Smoker    Smokeless tobacco: Never Used   Substance and Sexual Activity    Alcohol use: Yes     Comment: once a year     Drug use: Never    Sexual activity: Yes     Partners: Male     Birth control/protection: Surgical     Social Determinants of Health  "    Financial Resource Strain: Low Risk     Difficulty of Paying Living Expenses: Not hard at all   Food Insecurity: Food Insecurity Present    Worried About Running Out of Food in the Last Year: Never true    Ran Out of Food in the Last Year: Sometimes true   Transportation Needs: Unmet Transportation Needs    Lack of Transportation (Medical): Yes    Lack of Transportation (Non-Medical): Patient refused   Physical Activity: Insufficiently Active    Days of Exercise per Week: 3 days    Minutes of Exercise per Session: 20 min   Stress: No Stress Concern Present    Feeling of Stress : Only a little   Social Connections: Unknown    Frequency of Communication with Friends and Family: Twice a week    Frequency of Social Gatherings with Friends and Family: Once a week    Active Member of Clubs or Organizations: No    Attends Club or Organization Meetings: 1 to 4 times per year    Marital Status:    Housing Stability: Low Risk     Unable to Pay for Housing in the Last Year: No    Number of Places Lived in the Last Year: 1    Unstable Housing in the Last Year: No     Review of patient's allergies indicates:   Allergen Reactions    Demerol [meperidine] Anaphylaxis     Other reaction(s): Itching    Dilaudid [hydromorphone (bulk)] Anaphylaxis     "coded" per pt  Patient can tolerate Lortab    Prednisone Itching     Other reaction(s): Itching    Tramadol      shaking       Objective:       BP (!) 149/83 (BP Location: Right arm, Patient Position: Sitting, BP Method: Large (Automatic))   Pulse 73   Temp 98.2 °F (36.8 °C) (Tympanic)   Ht 5' 6" (1.676 m)   Wt 132.6 kg (292 lb 5.3 oz)   SpO2 100%   BMI 47.18 kg/m²   Physical Exam  Constitutional:       General: She is not in acute distress.     Appearance: Normal appearance. She is well-developed. She is obese. She is not ill-appearing or diaphoretic.   Cardiovascular:      Rate and Rhythm: Normal rate and regular rhythm.      Heart sounds: Normal " heart sounds.   Pulmonary:      Effort: Pulmonary effort is normal.      Breath sounds: Normal breath sounds.   Neurological:      Mental Status: She is alert and oriented to person, place, and time.   Psychiatric:         Mood and Affect: Mood normal.         Behavior: Behavior normal.         Thought Content: Thought content normal.         Judgment: Judgment normal.       Assessment:     1. Primary hypertension    2. Morbid obesity with BMI of 45.0-49.9, adult    3. Status post bariatric surgery    4. Muscle spasms of neck    5. Hyperglycemia    6. Hypersomnia    7. Fatty liver      Plan:   Primary hypertension    Morbid obesity with BMI of 45.0-49.9, adult  -     Ambulatory referral/consult to Sleep Disorders; Future; Expected date: 03/24/2022    Status post bariatric surgery    Muscle spasms of neck    Hyperglycemia  -     Comprehensive Metabolic Panel; Future; Expected date: 03/17/2022  -     CBC Auto Differential; Future; Expected date: 03/17/2022  -     Hemoglobin A1C; Future; Expected date: 03/17/2022    Hypersomnia  -     Ambulatory referral/consult to Sleep Disorders; Future; Expected date: 03/24/2022    Fatty liver    Other orders  -     valsartan (DIOVAN) 160 MG tablet; Take 1 tablet (160 mg total) by mouth once daily.  Dispense: 90 tablet; Refill: 3    letter written for patient  Nurse bp recheck next week  Medication List with Changes/Refills   New Medications    VALSARTAN (DIOVAN) 160 MG TABLET    Take 1 tablet (160 mg total) by mouth once daily.   Current Medications    DICLOFENAC SODIUM (VOLTAREN) 1 % GEL    APPLY 2 GRAMS TO AFFECTED AREA 4 TIMES A DAY    DOXEPIN (SINEQUAN) 75 MG CAPSULE    Take 1 capsule (75 mg total) by mouth every evening.    DULOXETINE (CYMBALTA) 60 MG CAPSULE    Take 1 capsule (60 mg total) by mouth once daily.    ESOMEPRAZOLE (NEXIUM) 40 MG CAPSULE    TAKE 1 CAPSULE (40 MG TOTAL) BY MOUTH BEFORE BREAKFAST.    GABAPENTIN (NEURONTIN) 300 MG CAPSULE    600mg at morning and  afternoon. 900mg at night.    HYDROCODONE-ACETAMINOPHEN (NORCO)  MG PER TABLET    Take 1 tablet by mouth every 8 (eight) hours as needed for Pain.    LACTULOSE (CHRONULAC) 20 GRAM/30 ML SOLN    Take 15 mLs (10 g total) by mouth 3 (three) times daily.    LIDOCAINE-PRILOCAINE (EMLA) CREAM        LINACLOTIDE (LINZESS) 145 MCG CAP CAPSULE    Take 1 capsule (145 mcg total) by mouth before breakfast.    METHOCARBAMOL (ROBAXIN) 500 MG TAB    Take 500 mg by mouth 4 (four) times daily.    MUPIROCIN (BACTROBAN) 2 % OINTMENT    Apply topically 2 (two) times daily.    MUPIROCIN (BACTROBAN) 2 % OINTMENT    Apply topically 3 (three) times daily.    OCRELIZUMAB (OCREVUS IV)    Inject into the vein.    PROMETHAZINE (PHENERGAN) 25 MG TABLET    Take 1 tablet (25 mg total) by mouth every 4 (four) hours.    SUMATRIPTAN (IMITREX) 100 MG TABLET    TAKE 1 TAB AT ONSET OF HEADACH MAY TAKE 2ND TAB 2 HOURS LATER IF NO RELIEF MAX 6 TABS A WEEK    TOPIRAMATE (TOPAMAX) 50 MG TABLET    TAKE 1 TABLET BY MOUTH IN THE MORNING AND 2 TABLETS AT BEDTIME    TRIAMCINOLONE ACETONIDE 0.1% (KENALOG) 0.1 % OINTMENT    Apply topically 2 (two) times daily.   Discontinued Medications    CYCLOBENZAPRINE (FLEXERIL) 10 MG TABLET    TAKE 1 TABLET BY MOUTH THREE TIMES A DAY AS NEEDED FOR MUSCLE SPASMS    VALSARTAN (DIOVAN) 80 MG TABLET    Take 1 tablet (80 mg total) by mouth once daily. (replaces hydrochlorothiazide for BP)        16

## 2022-04-20 LAB
A1C WITH ESTIMATED AVERAGE GLUCOSE RESULT: 7.3 % — HIGH (ref 4–5.6)
ALBUMIN SERPL ELPH-MCNC: 3.8 G/DL — SIGNIFICANT CHANGE UP (ref 3.5–5.2)
ALP SERPL-CCNC: 72 U/L — SIGNIFICANT CHANGE UP (ref 30–115)
ALT FLD-CCNC: 17 U/L — SIGNIFICANT CHANGE UP (ref 0–41)
ANION GAP SERPL CALC-SCNC: 11 MMOL/L — SIGNIFICANT CHANGE UP (ref 7–14)
ANION GAP SERPL CALC-SCNC: 15 MMOL/L — HIGH (ref 7–14)
ANION GAP SERPL CALC-SCNC: 18 MMOL/L — HIGH (ref 7–14)
AST SERPL-CCNC: 18 U/L — SIGNIFICANT CHANGE UP (ref 0–41)
BILIRUB SERPL-MCNC: 0.8 MG/DL — SIGNIFICANT CHANGE UP (ref 0.2–1.2)
BUN SERPL-MCNC: 15 MG/DL — SIGNIFICANT CHANGE UP (ref 10–20)
BUN SERPL-MCNC: 16 MG/DL — SIGNIFICANT CHANGE UP (ref 10–20)
BUN SERPL-MCNC: 16 MG/DL — SIGNIFICANT CHANGE UP (ref 10–20)
CALCIUM SERPL-MCNC: 8.6 MG/DL — SIGNIFICANT CHANGE UP (ref 8.5–10.1)
CALCIUM SERPL-MCNC: 8.6 MG/DL — SIGNIFICANT CHANGE UP (ref 8.5–10.1)
CALCIUM SERPL-MCNC: 8.8 MG/DL — SIGNIFICANT CHANGE UP (ref 8.5–10.1)
CHLORIDE SERPL-SCNC: 93 MMOL/L — LOW (ref 98–110)
CHLORIDE SERPL-SCNC: 94 MMOL/L — LOW (ref 98–110)
CHLORIDE SERPL-SCNC: 95 MMOL/L — LOW (ref 98–110)
CHLORIDE UR-SCNC: 50 — SIGNIFICANT CHANGE UP
CO2 SERPL-SCNC: 24 MMOL/L — SIGNIFICANT CHANGE UP (ref 17–32)
CO2 SERPL-SCNC: 24 MMOL/L — SIGNIFICANT CHANGE UP (ref 17–32)
CO2 SERPL-SCNC: 27 MMOL/L — SIGNIFICANT CHANGE UP (ref 17–32)
CREAT SERPL-MCNC: 1 MG/DL — SIGNIFICANT CHANGE UP (ref 0.7–1.5)
CREAT SERPL-MCNC: 1.1 MG/DL — SIGNIFICANT CHANGE UP (ref 0.7–1.5)
CREAT SERPL-MCNC: 1.2 MG/DL — SIGNIFICANT CHANGE UP (ref 0.7–1.5)
EGFR: 67 ML/MIN/1.73M2 — SIGNIFICANT CHANGE UP
EGFR: 74 ML/MIN/1.73M2 — SIGNIFICANT CHANGE UP
EGFR: 83 ML/MIN/1.73M2 — SIGNIFICANT CHANGE UP
ESTIMATED AVERAGE GLUCOSE: 163 MG/DL — HIGH (ref 68–114)
GLUCOSE BLDC GLUCOMTR-MCNC: 108 MG/DL — HIGH (ref 70–99)
GLUCOSE BLDC GLUCOMTR-MCNC: 137 MG/DL — HIGH (ref 70–99)
GLUCOSE BLDC GLUCOMTR-MCNC: 160 MG/DL — HIGH (ref 70–99)
GLUCOSE BLDC GLUCOMTR-MCNC: 179 MG/DL — HIGH (ref 70–99)
GLUCOSE BLDC GLUCOMTR-MCNC: 202 MG/DL — HIGH (ref 70–99)
GLUCOSE BLDC GLUCOMTR-MCNC: 231 MG/DL — HIGH (ref 70–99)
GLUCOSE BLDC GLUCOMTR-MCNC: 241 MG/DL — HIGH (ref 70–99)
GLUCOSE BLDC GLUCOMTR-MCNC: 303 MG/DL — HIGH (ref 70–99)
GLUCOSE BLDC GLUCOMTR-MCNC: 324 MG/DL — HIGH (ref 70–99)
GLUCOSE BLDC GLUCOMTR-MCNC: 74 MG/DL — SIGNIFICANT CHANGE UP (ref 70–99)
GLUCOSE SERPL-MCNC: 249 MG/DL — HIGH (ref 70–99)
GLUCOSE SERPL-MCNC: 281 MG/DL — HIGH (ref 70–99)
GLUCOSE SERPL-MCNC: 60 MG/DL — LOW (ref 70–99)
HCT VFR BLD CALC: 31.7 % — LOW (ref 42–52)
HGB BLD-MCNC: 11.2 G/DL — LOW (ref 14–18)
LACTATE SERPL-SCNC: 1.6 MMOL/L — SIGNIFICANT CHANGE UP (ref 0.7–2)
MAGNESIUM SERPL-MCNC: 1.6 MG/DL — LOW (ref 1.8–2.4)
MAGNESIUM SERPL-MCNC: 2.5 MG/DL — HIGH (ref 1.8–2.4)
MCHC RBC-ENTMCNC: 31 PG — SIGNIFICANT CHANGE UP (ref 27–31)
MCHC RBC-ENTMCNC: 35.3 G/DL — SIGNIFICANT CHANGE UP (ref 32–37)
MCV RBC AUTO: 87.8 FL — SIGNIFICANT CHANGE UP (ref 80–94)
NRBC # BLD: 0 /100 WBCS — SIGNIFICANT CHANGE UP (ref 0–0)
PLATELET # BLD AUTO: 195 K/UL — SIGNIFICANT CHANGE UP (ref 130–400)
POTASSIUM SERPL-MCNC: 3.3 MMOL/L — LOW (ref 3.5–5)
POTASSIUM SERPL-MCNC: 3.5 MMOL/L — SIGNIFICANT CHANGE UP (ref 3.5–5)
POTASSIUM SERPL-MCNC: 3.6 MMOL/L — SIGNIFICANT CHANGE UP (ref 3.5–5)
POTASSIUM SERPL-SCNC: 3.3 MMOL/L — LOW (ref 3.5–5)
POTASSIUM SERPL-SCNC: 3.5 MMOL/L — SIGNIFICANT CHANGE UP (ref 3.5–5)
POTASSIUM SERPL-SCNC: 3.6 MMOL/L — SIGNIFICANT CHANGE UP (ref 3.5–5)
PROT SERPL-MCNC: 6.5 G/DL — SIGNIFICANT CHANGE UP (ref 6–8)
RBC # BLD: 3.61 M/UL — LOW (ref 4.7–6.1)
RBC # FLD: 12.7 % — SIGNIFICANT CHANGE UP (ref 11.5–14.5)
SODIUM SERPL-SCNC: 132 MMOL/L — LOW (ref 135–146)
SODIUM SERPL-SCNC: 134 MMOL/L — LOW (ref 135–146)
SODIUM SERPL-SCNC: 135 MMOL/L — SIGNIFICANT CHANGE UP (ref 135–146)
WBC # BLD: 10.47 K/UL — SIGNIFICANT CHANGE UP (ref 4.8–10.8)
WBC # FLD AUTO: 10.47 K/UL — SIGNIFICANT CHANGE UP (ref 4.8–10.8)

## 2022-04-20 PROCEDURE — 99222 1ST HOSP IP/OBS MODERATE 55: CPT

## 2022-04-20 PROCEDURE — 71045 X-RAY EXAM CHEST 1 VIEW: CPT | Mod: 26

## 2022-04-20 PROCEDURE — 99291 CRITICAL CARE FIRST HOUR: CPT

## 2022-04-20 RX ORDER — METOPROLOL TARTRATE 50 MG
50 TABLET ORAL
Refills: 0 | Status: DISCONTINUED | OUTPATIENT
Start: 2022-04-20 | End: 2022-04-25

## 2022-04-20 RX ORDER — POTASSIUM CHLORIDE 20 MEQ
40 PACKET (EA) ORAL EVERY 4 HOURS
Refills: 0 | Status: COMPLETED | OUTPATIENT
Start: 2022-04-20 | End: 2022-04-20

## 2022-04-20 RX ORDER — INSULIN HUMAN 100 [IU]/ML
8 INJECTION, SOLUTION SUBCUTANEOUS
Qty: 100 | Refills: 0 | Status: DISCONTINUED | OUTPATIENT
Start: 2022-04-20 | End: 2022-04-21

## 2022-04-20 RX ORDER — METOPROLOL TARTRATE 50 MG
5 TABLET ORAL ONCE
Refills: 0 | Status: COMPLETED | OUTPATIENT
Start: 2022-04-20 | End: 2022-04-20

## 2022-04-20 RX ORDER — SODIUM CHLORIDE 9 MG/ML
1000 INJECTION INTRAMUSCULAR; INTRAVENOUS; SUBCUTANEOUS
Refills: 0 | Status: DISCONTINUED | OUTPATIENT
Start: 2022-04-20 | End: 2022-04-21

## 2022-04-20 RX ORDER — MAGNESIUM SULFATE 500 MG/ML
2 VIAL (ML) INJECTION
Refills: 0 | Status: COMPLETED | OUTPATIENT
Start: 2022-04-20 | End: 2022-04-20

## 2022-04-20 RX ORDER — ACETAMINOPHEN 500 MG
1000 TABLET ORAL ONCE
Refills: 0 | Status: COMPLETED | OUTPATIENT
Start: 2022-04-20 | End: 2022-04-20

## 2022-04-20 RX ADMIN — Medication 40 MILLIEQUIVALENT(S): at 17:04

## 2022-04-20 RX ADMIN — Medication 4: at 08:43

## 2022-04-20 RX ADMIN — AMLODIPINE BESYLATE 10 MILLIGRAM(S): 2.5 TABLET ORAL at 08:44

## 2022-04-20 RX ADMIN — Medication 650 MILLIGRAM(S): at 16:00

## 2022-04-20 RX ADMIN — Medication 108 GRAM(S): at 03:40

## 2022-04-20 RX ADMIN — INSULIN HUMAN 3 UNIT(S)/HR: 100 INJECTION, SOLUTION SUBCUTANEOUS at 11:30

## 2022-04-20 RX ADMIN — SODIUM CHLORIDE 75 MILLILITER(S): 9 INJECTION INTRAMUSCULAR; INTRAVENOUS; SUBCUTANEOUS at 09:37

## 2022-04-20 RX ADMIN — Medication 271 MILLIGRAM(S): at 08:44

## 2022-04-20 RX ADMIN — LISINOPRIL 40 MILLIGRAM(S): 2.5 TABLET ORAL at 06:58

## 2022-04-20 RX ADMIN — Medication 50 MILLIGRAM(S): at 17:05

## 2022-04-20 RX ADMIN — INSULIN HUMAN 8 UNIT(S)/HR: 100 INJECTION, SOLUTION SUBCUTANEOUS at 14:42

## 2022-04-20 RX ADMIN — Medication 400 MILLIGRAM(S): at 03:23

## 2022-04-20 RX ADMIN — Medication 25 GRAM(S): at 09:38

## 2022-04-20 RX ADMIN — Medication 650 MILLIGRAM(S): at 14:07

## 2022-04-20 RX ADMIN — CHLORHEXIDINE GLUCONATE 1 APPLICATION(S): 213 SOLUTION TOPICAL at 06:57

## 2022-04-20 RX ADMIN — Medication 1000 MILLIGRAM(S): at 03:38

## 2022-04-20 RX ADMIN — PANTOPRAZOLE SODIUM 40 MILLIGRAM(S): 20 TABLET, DELAYED RELEASE ORAL at 06:59

## 2022-04-20 RX ADMIN — INSULIN HUMAN 5 UNIT(S)/HR: 100 INJECTION, SOLUTION SUBCUTANEOUS at 12:35

## 2022-04-20 RX ADMIN — Medication 30 MILLIGRAM(S): at 12:07

## 2022-04-20 RX ADMIN — CEFTRIAXONE 100 MILLIGRAM(S): 500 INJECTION, POWDER, FOR SOLUTION INTRAMUSCULAR; INTRAVENOUS at 22:12

## 2022-04-20 RX ADMIN — Medication 271 MILLIGRAM(S): at 00:00

## 2022-04-20 RX ADMIN — CEFTRIAXONE 100 MILLIGRAM(S): 500 INJECTION, POWDER, FOR SOLUTION INTRAMUSCULAR; INTRAVENOUS at 13:52

## 2022-04-20 RX ADMIN — ENOXAPARIN SODIUM 40 MILLIGRAM(S): 100 INJECTION SUBCUTANEOUS at 06:58

## 2022-04-20 RX ADMIN — Medication 40 MILLIEQUIVALENT(S): at 22:12

## 2022-04-20 RX ADMIN — Medication 5 MILLIGRAM(S): at 00:49

## 2022-04-20 RX ADMIN — Medication 50 MILLIGRAM(S): at 06:58

## 2022-04-20 RX ADMIN — Medication 6 UNIT(S): at 08:44

## 2022-04-20 RX ADMIN — ATORVASTATIN CALCIUM 40 MILLIGRAM(S): 80 TABLET, FILM COATED ORAL at 22:12

## 2022-04-20 RX ADMIN — INSULIN GLARGINE 15 UNIT(S): 100 INJECTION, SOLUTION SUBCUTANEOUS at 00:08

## 2022-04-20 RX ADMIN — INSULIN HUMAN 1 UNIT(S)/HR: 100 INJECTION, SOLUTION SUBCUTANEOUS at 09:38

## 2022-04-20 RX ADMIN — Medication 25 GRAM(S): at 12:08

## 2022-04-20 RX ADMIN — Medication 300 MILLIGRAM(S): at 06:58

## 2022-04-20 NOTE — CONSULT NOTE ADULT - ATTENDING COMMENTS
IMPRESSION:    HAGMA with Metabolic alkalosis   Respiratory alkalosis   HO DM with diabetic neuropathy  Mild DKA     Plan as outlined above
Pt admitted with ? DKA/HAGMA with AMS currently back to baseline.  HCT nonspecific with no acute changes.  Has generalized weakness and light bradyphrenia but otherwise nonfocal on exam.  suspect toxic/metabolic encephalopathy.  Recommendations as above.

## 2022-04-20 NOTE — CONSULT NOTE ADULT - ASSESSMENT
ASSESSMENT  68yo M w/ pmhx cad s/p stent 2008, DM, HTN, HLD, presenting to the ED for evaluation of nausea, vomiting, weakness for the past two days. Pt reports yesterday had few episodes of NBNB vomitin. Patientt states today he was very weak and unable to tolerate po which prompted him to come to ED. Also reports watery diarrhea without hematochezia. Denies any sick contacts. Denies fever, chills, CP, SOB, coughing, dysuria. Reports compliance with meds including his diabetes meds but was not able to take them today due to N/V.     IMPRESSION  #Sepsis on admission - unclear source, possible CAP   #Acute Hypoxic Respiratory failure   #Hyperglycemia - mild DKA   #[place]/Vomiting  #Hypertensive Urgency     #Obesity BMI (kg/m2): 31.4  #Abx allergy: NKDA    RECOMMENDATIONS  - check RVP   - if having diarrhea, check GI PCR   - check urine legionella and urine strep antigen   - continue ceftriaxone 2g daily and Levofloxacin 750 mg daily for now   - follow-up blood cx   - Supplemental O2 per primary     Please call or message on [employer] Teams if with any questions.  Spectra [phone]

## 2022-04-20 NOTE — CONSULT NOTE ADULT - SUBJECTIVE AND OBJECTIVE BOX
ARTURO LINDER  67y, Male  Allergy: No Known Allergies      CHIEF COMPLAINT: hyperglycemia (2022 08:51)      LOS  2d    HPI:  66yo M w/ pmhx cad s/p stent , DM, HTN, HLD, presenting to the ED for evaluation of nausea, vomiting, weakness for the past two days. Pt reports yesterday had few episodes of NBNB vomitin. Patientt states today he was very weak and unable to tolerate po which prompted him to come to ED. Also reports watery diarrhea without hematochezia. Denies any sick contacts. Denies fever, chills, CP, SOB, coughing, dysuria. Reports compliance with meds including his diabetes meds but was not able to take them today due to N/V.     ED course:   vitals: /109, , T 96.4F, O2 92% RA -> O2 96% on 2L NC  labs: WBC 13.92, K 3.4, lactate 2 -> 1.4, AG 21, BHB 1.6, glucose 306  imaging:   - CT a/p IV con: Colonic diverticula noted with no evidence of diverticulitis. No evidence of bowel obstruction, colitis, inflammatory process, or ascites. No pneumoperitoneum.   - CXR wet read: cardiomegaly w/ b/l opacities?  given: NS 2L bolus total, KCL 50meq total, tylenol, zofran  progress: MAR spoke to ICU regarding possible insulin drip for DKA, ICU rejected but admitted to stepdown and said to monitor on subq insulin for now (2022 23:27)      INFECTIOUS DISEASE HISTORY:  History as above.   Presents with nausea/vomiting and watery diarrhea.   Found to be hypertensive on admission with leukocytosis.   CT Abd/pelvis with no evidence of diverticulitis.   Initially admitted to step down, but remained weak and lethargic.   He has been started empirically antibiotics for meningitis coverage.     PAST MEDICAL & SURGICAL HISTORY:  CAD S/P percutaneous coronary angioplasty    HTN (hypertension)    Diabetes mellitus    Hyperlipidemia    Anxiety    No significant past surgical history        FAMILY HISTORY  No pertinent family history in first degree relatives        SOCIAL HISTORY  Social History:  from home walks w/ cane  quit smoking in   used to spoke 4 packs a day for many years, was unable to quantify (2022 23:27)        ROS  General: Denies rigors, nightsweats  HEENT: Denies headache, rhinorrhea, sore throat, eye pain  CV: Denies CP, palpitations  PULM: Denies wheezing, hemoptysis  GI: Denies hematemesis, hematochezia, melena  : Denies discharge, hematuria  MSK: Denies arthralgias, myalgias  SKIN: Denies rash, lesions  NEURO: Denies paresthesias, weakness  PSYCH: Denies depression, anxiety    VITALS:  T(F): 99, Max: 102.8 (22 @ 18:00)  HR: 94  BP: 118/71  RR: 22Vital Signs Last 24 Hrs  T(C): 37.2 (2022 08:00), Max: 39.3 (2022 18:00)  T(F): 99 (2022 08:00), Max: 102.8 (2022 18:00)  HR: 94 (2022 08:00) (94 - 132)  BP: 118/71 (2022 08:00) (118/71 - 183/91)  BP(mean): 83 (2022 08:00) (80 - 124)  RR: 22 (2022 08:00) (13 - 29)  SpO2: 98% (2022 08:00) (89% - 98%)    PHYSICAL EXAM:  Gen: NAD, resting in bed  HEENT: Normocephalic, atraumatic  Neck: supple, no lymphadenopathy  CV: Regular rate & regular rhythm  Lungs: decreased BS at bases, no fremitus  Abdomen: Soft, BS present  Ext: Warm, well perfused  Neuro: non focal, awake  Skin: no rash, no erythema  Lines: no phlebitis    TESTS & MEASUREMENTS:                        11.2   10.47 )-----------( 195      ( 2022 05:30 )             31.7     04-20    135  |  93<L>  |  16  ----------------------------<  281<H>  3.5   |  24  |  1.2    Ca    8.6      2022 05:30  Phos  2.1     04-19  Mg     1.6     04-20    TPro  6.5  /  Alb  3.8  /  TBili  0.8  /  DBili  x   /  AST  18  /  ALT  17  /  AlkPhos  72  04-20      LIVER FUNCTIONS - ( 2022 05:30 )  Alb: 3.8 g/dL / Pro: 6.5 g/dL / ALK PHOS: 72 U/L / ALT: 17 U/L / AST: 18 U/L / GGT: x           Urinalysis Basic - ( 2022 16:30 )    Color: Yellow / Appearance: Clear / S.021 / pH: x  Gluc: x / Ketone: Small  / Bili: Negative / Urobili: 3 mg/dL   Blood: x / Protein: 100 mg/dL / Nitrite: Negative   Leuk Esterase: Negative / RBC: 5 /HPF / WBC 1 /HPF   Sq Epi: x / Non Sq Epi: 5 /HPF / Bacteria: Negative          Lactate, Blood: 1.6 mmol/L (22 @ 05:30)  Lactate, Blood: 1.7 mmol/L (22 @ 21:15)  Blood Gas Venous - Lactate: 1.40 mmol/L (22 @ 21:31)  Lactate, Blood: 2.0 mmol/L (22 @ 18:32)      INFECTIOUS DISEASES TESTING  Procalcitonin, Serum: 0.17 ng/mL (22 @ 04:30)  COVID-19 PCR: NotDetec (22 @ 22:25)      RADIOLOGY & ADDITIONAL TESTS:  I have personally reviewed the last Chest xray  CXR  Xray Chest 1 View- PORTABLE-Urgent:   ACC: [id number] EXAM:  XR CHEST PORTABLE URGENT 1V                          PROCEDURE DATE:  2022          INTERPRETATION:  Clinical History / Reason for exam: Dyspnea    Comparison : Chest radiograph 2022.    Technique/Positioning: Frontal.    Findings:    Support devices: None.    Cardiac/mediastinum/hilum: Unremarkable.    Lung parenchyma/Pleura: Within normal limits.    Skeleton/soft tissues: Unremarkable.    Impression:    No radiographic evidence of acute cardiopulmonary disease.        --- End of Report ---            NEETU SCHNEIDER MD; Attending Radiologist  This document has been electronically signed. 2022 10:02AM (22 @ 21:37)      CT  CT Abdomen and Pelvis w/ IV Cont:   ACC: [id number] EXAM:  CT ABDOMEN AND PELVIS IC                          PROCEDURE DATE:  2022          INTERPRETATION:  REASON FOR EXAM / CLINICAL STATEMENT:    Nausea/vomiting/diarrhea. WBC 13.92    TECHNIQUE:  Contiguous axial CT images wereobtained from the lower chest   to the pubic symphysis with IV contrast.  Reformatted images in the   coronal and sagittal planes were acquired.      COMPARISON CT: CT scan of the abdomen and pelvis dated 2010    OTHER STUDIES USED FOR CORRELATION: None.    FINDINGS:    TUBES AND LINES: None.    LOWER CHEST: Cardiomegaly. There are mild atelectatic changes region of   the right middle lobe and at the lung bases. No pleural or pericardial   effusion.    HEPATIC: The liver is normal in size with no evidence of solid mass or   bile duct dilatation. Previously noted 1.2 cm hepatic cyst on the scan of   2010, is mostly obscured by streak artifact arising from the   patient's arms. Mild hepatic steatosis is noted. The portal vein is   patent.    BILIARY: No calcified gallstones are noted.    SPLEEN: Unremarkable.    PANCREAS: Pancreatic atrophy is noted in the region of the pancreatic   head. No evidence of mass or pancreatitis.    ADRENAL GLANDS: Stable 2.2 cm nodule in the right adrenal gland. The left   adrenal gland is unremarkable.    KIDNEYS: There is symmetric renal enhancement. No evidence of   hydronephrosis, calcified stones, or solid mass.    ABDOMINOPELVIC NODES: Unremarkable.    PELVIC ORGANS: No evidence of pelvic mass, lymphadenopathy, or fluid   collection.    BLADDER: Unremarkable.    PERITONEUM/MESENTERY/BOWEL: Colonic diverticula noted with no evidence of   diverticulitis. No evidence of bowel obstruction, colitis, inflammatory   process, or ascites. No pneumoperitoneum.  The appendix is normal in   appearance.    BONES/SOFT TISSUES: Degenerative changes of the spine are noted. No   evidence of acute fracture.    OTHER: Aortoiliac calcifications are noted with no evidence of abdominal   aortic aneurysm.      IMPRESSION:    No evidence of abdominal or pelvic mass or inflammatory process    --- End of Report ---            CHINO HORAN MD; Attending Interventional Radiologist  This document has been electronically signed. 2022  8:08PM (22 @ 18:30)      CARDIOLOGY TESTING  12 Lead ECG:   Ventricular Rate 119 BPM    Atrial Rate 119 BPM    P-R Interval 136 ms    QRS Duration 88 ms    Q-T Interval 320 ms    QTC Calculation(Bazett) 450 ms    P Axis 38 degrees    R Axis 35 degrees    T Axis 0 degrees    Diagnosis Line Sinus tachycardia  Otherwise normal ECG    Confirmed by HOUSTON ONEIL MD ([id number]) on 2022 8:52:22 AM (22 @ 03:20)  12 Lead ECG:   Ventricular Rate 128 BPM    Atrial Rate 128 BPM    P-R Interval 148 ms    QRS Duration 90 ms    Q-T Interval 306 ms    QTC Calculation(Bazett) 446 ms    P Axis 47 degrees    R Axis 102 degrees    T Axis -5 degrees    Diagnosis Line Sinus tachycardia  Rightward axis  ST & T wave abnormality, consider inferior ischemia  Abnormal ECG    Confirmed by Irene Palma MD ([id number]) on 2022 6:43:54 AM (22 @ 17:08)      MEDICATIONS  amLODIPine   Tablet 10 Oral daily  atorvastatin 40 Oral at bedtime  cefTRIAXone   IVPB 2000 IV Intermittent every 12 hours  chlorhexidine 4% Liquid 1 Topical <User Schedule>  dextrose 5%. 1000 IV Continuous <Continuous>  dextrose 5%. 1000 IV Continuous <Continuous>  dextrose 50% Injectable 25 IV Push once  dextrose 50% Injectable 12.5 IV Push once  dextrose 50% Injectable 25 IV Push once  enoxaparin Injectable 40 SubCutaneous every 24 hours  furosemide   Injectable 40 IV Push daily  glucagon  Injectable 1 IntraMuscular once  insulin glargine Injectable (LANTUS) 15 SubCutaneous at bedtime  insulin lispro (ADMELOG) corrective regimen sliding scale  SubCutaneous three times a day before meals  insulin lispro Injectable (ADMELOG) 6 SubCutaneous Before meals and at bedtime  insulin regular Infusion 1 IV Continuous <Continuous>  levoFLOXacin IVPB 750 IV Intermittent every 24 hours  lisinopril 40 Oral daily  magnesium sulfate  IVPB 2 IV Intermittent every 2 hours  metoprolol succinate ER 50 Oral daily  pantoprazole    Tablet 40 Oral before breakfast  PARoxetine 30 Oral daily  sodium chloride 0.9%. 1000 IV Continuous <Continuous>      Weight  Weight (kg): 105.2 (22 @ 17:00)    ANTIBIOTICS:  cefTRIAXone   IVPB 2000 milliGRAM(s) IV Intermittent every 12 hours  levoFLOXacin IVPB 750 milliGRAM(s) IV Intermittent every 24 hours      ALLERGIES:  No Known Allergies      ASSESSMENT  67y M admitted with NAUSEA AND VOMITING; SINUS TACHYCARDIA; HYPOKALEMIA      IMPRESSION  #Obesity BMI (kg/m2): 31.4  #Abx allergy:       RECOMMENDATIONS  This is a preliminary incomplete pended note, all final recommendations to follow after interview and examination of the patient.    Please call or message on [employer] Teams if with any questions.  Spectra [phone]     ARTURO LINDER  67y, Male  Allergy: No Known Allergies      CHIEF COMPLAINT: hyperglycemia (2022 08:51)      LOS  2d    HPI:  66yo M w/ pmhx cad s/p stent , DM, HTN, HLD, presenting to the ED for evaluation of nausea, vomiting, weakness for the past two days. Pt reports yesterday had few episodes of NBNB vomitin. Patientt states today he was very weak and unable to tolerate po which prompted him to come to ED. Also reports watery diarrhea without hematochezia. Denies any sick contacts. Denies fever, chills, CP, SOB, coughing, dysuria. Reports compliance with meds including his diabetes meds but was not able to take them today due to N/V.     ED course:   vitals: /109, , T 96.4F, O2 92% RA -> O2 96% on 2L NC  labs: WBC 13.92, K 3.4, lactate 2 -> 1.4, AG 21, BHB 1.6, glucose 306  imaging:   - CT a/p IV con: Colonic diverticula noted with no evidence of diverticulitis. No evidence of bowel obstruction, colitis, inflammatory process, or ascites. No pneumoperitoneum.   - CXR wet read: cardiomegaly w/ b/l opacities?  given: NS 2L bolus total, KCL 50meq total, tylenol, zofran  progress: MAR spoke to ICU regarding possible insulin drip for DKA, ICU rejected but admitted to stepdown and said to monitor on subq insulin for now (2022 23:27)      INFECTIOUS DISEASE HISTORY:  History as above.   Presents with nausea/vomiting and watery diarrhea.   Son reports that vomiting started on .   Since then unable to tolerates liquids/solids without vomiting.   Had diarrhea, but now seems to have resolved.   No fevers, dysuria, hematuria, coughing, shortness of breath at ho,e.   Found to be hypertensive on admission with leukocytosis.   CT Abd/pelvis with no evidence of diverticulitis.   Initially admitted to step down, but remained weak and lethargic.   He has been started empirically antibiotics for meningitis coverage.   Since admission, mental status has improved - although slow to answer.   He denies any headaches, vision changes.       PAST MEDICAL & SURGICAL HISTORY:  CAD S/P percutaneous coronary angioplasty    HTN (hypertension)    Diabetes mellitus    Hyperlipidemia    Anxiety    No significant past surgical history        FAMILY HISTORY  No pertinent family history in first degree relatives        SOCIAL HISTORY  Social History:  from home walks w/ candeidra  quit smoking in   used to spoke 4 packs a day for many years, was unable to quantify (2022 23:27)        ROS  General: Denies rigors, nightsweats  HEENT: Denies headache, rhinorrhea, sore throat, eye pain  CV: Denies CP, palpitations  PULM: Denies wheezing, hemoptysis  GI: Denies hematemesis, hematochezia, melena  : Denies discharge, hematuria  MSK: Denies arthralgias, myalgias  SKIN: Denies rash, lesions  NEURO: Denies paresthesias, weakness  PSYCH: Denies depression, anxiety    VITALS:  T(F): 99, Max: 102.8 (22 @ 18:00)  HR: 94  BP: 118/71  RR: 22Vital Signs Last 24 Hrs  T(C): 37.2 (2022 08:00), Max: 39.3 (2022 18:00)  T(F): 99 (2022 08:00), Max: 102.8 (2022 18:00)  HR: 94 (2022 08:00) (94 - 132)  BP: 118/71 (2022 08:00) (118/71 - 183/91)  BP(mean): 83 (2022 08:00) (80 - 124)  RR: 22 (2022 08:00) (13 - 29)  SpO2: 98% (2022 08:00) (89% - 98%)    PHYSICAL EXAM:  Gen: NAD, resting in bed  HEENT: Normocephalic, atraumatic  Neck: supple, no lymphadenopathy  CV: Regular rate & regular rhythm  Lungs: decreased BS at bases, no fremitus  Abdomen: Soft, BS present  Ext: Warm, well perfused  Neuro: non focal, awake  Skin: no rash, no erythema  Lines: no phlebitis    TESTS & MEASUREMENTS:                        11.2   10.47 )-----------( 195      ( 2022 05:30 )             31.7     04-20    135  |  93<L>  |  16  ----------------------------<  281<H>  3.5   |  24  |  1.2    Ca    8.6      2022 05:30  Phos  2.1       Mg     1.6         TPro  6.5  /  Alb  3.8  /  TBili  0.8  /  DBili  x   /  AST  18  /  ALT  17  /  AlkPhos  72        LIVER FUNCTIONS - ( 2022 05:30 )  Alb: 3.8 g/dL / Pro: 6.5 g/dL / ALK PHOS: 72 U/L / ALT: 17 U/L / AST: 18 U/L / GGT: x           Urinalysis Basic - ( 2022 16:30 )    Color: Yellow / Appearance: Clear / S.021 / pH: x  Gluc: x / Ketone: Small  / Bili: Negative / Urobili: 3 mg/dL   Blood: x / Protein: 100 mg/dL / Nitrite: Negative   Leuk Esterase: Negative / RBC: 5 /HPF / WBC 1 /HPF   Sq Epi: x / Non Sq Epi: 5 /HPF / Bacteria: Negative          Lactate, Blood: 1.6 mmol/L (22 @ 05:30)  Lactate, Blood: 1.7 mmol/L (22 @ 21:15)  Blood Gas Venous - Lactate: 1.40 mmol/L (22 @ 21:31)  Lactate, Blood: 2.0 mmol/L (22 @ 18:32)      INFECTIOUS DISEASES TESTING  Procalcitonin, Serum: 0.17 ng/mL (22 @ 04:30)  COVID-19 PCR: NotDetec (22 @ 22:25)      RADIOLOGY & ADDITIONAL TESTS:  I have personally reviewed the last Chest xray  CXR  Xray Chest 1 View- PORTABLE-Urgent:   ACC: [id number] EXAM:  XR CHEST PORTABLE URGENT 1V                          PROCEDURE DATE:  2022          INTERPRETATION:  Clinical History / Reason for exam: Dyspnea    Comparison : Chest radiograph 2022.    Technique/Positioning: Frontal.    Findings:    Support devices: None.    Cardiac/mediastinum/hilum: Unremarkable.    Lung parenchyma/Pleura: Within normal limits.    Skeleton/soft tissues: Unremarkable.    Impression:    No radiographic evidence of acute cardiopulmonary disease.        --- End of Report ---            NEETU SCHNEIDER MD; Attending Radiologist  This document has been electronically signed. 2022 10:02AM (22 @ 21:37)      CT  CT Abdomen and Pelvis w/ IV Cont:   ACC: [id number] EXAM:  CT ABDOMEN AND PELVIS IC                          PROCEDURE DATE:  2022          INTERPRETATION:  REASON FOR EXAM / CLINICAL STATEMENT:    Nausea/vomiting/diarrhea. WBC 13.92    TECHNIQUE:  Contiguous axial CT images wereobtained from the lower chest   to the pubic symphysis with IV contrast.  Reformatted images in the   coronal and sagittal planes were acquired.      COMPARISON CT: CT scan of the abdomen and pelvis dated 2010    OTHER STUDIES USED FOR CORRELATION: None.    FINDINGS:    TUBES AND LINES: None.    LOWER CHEST: Cardiomegaly. There are mild atelectatic changes region of   the right middle lobe and at the lung bases. No pleural or pericardial   effusion.    HEPATIC: The liver is normal in size with no evidence of solid mass or   bile duct dilatation. Previously noted 1.2 cm hepatic cyst on the scan of   2010, is mostly obscured by streak artifact arising from the   patient's arms. Mild hepatic steatosis is noted. The portal vein is   patent.    BILIARY: No calcified gallstones are noted.    SPLEEN: Unremarkable.    PANCREAS: Pancreatic atrophy is noted in the region of the pancreatic   head. No evidence of mass or pancreatitis.    ADRENAL GLANDS: Stable 2.2 cm nodule in the right adrenal gland. The left   adrenal gland is unremarkable.    KIDNEYS: There is symmetric renal enhancement. No evidence of   hydronephrosis, calcified stones, or solid mass.    ABDOMINOPELVIC NODES: Unremarkable.    PELVIC ORGANS: No evidence of pelvic mass, lymphadenopathy, or fluid   collection.    BLADDER: Unremarkable.    PERITONEUM/MESENTERY/BOWEL: Colonic diverticula noted with no evidence of   diverticulitis. No evidence of bowel obstruction, colitis, inflammatory   process, or ascites. No pneumoperitoneum.  The appendix is normal in   appearance.    BONES/SOFT TISSUES: Degenerative changes of the spine are noted. No   evidence of acute fracture.    OTHER: Aortoiliac calcifications are noted with no evidence of abdominal   aortic aneurysm.      IMPRESSION:    No evidence of abdominal or pelvic mass or inflammatory process    --- End of Report ---            CHINO HORAN MD; Attending Interventional Radiologist  This document has been electronically signed. 2022  8:08PM (22 @ 18:30)      CARDIOLOGY TESTING  12 Lead ECG:   Ventricular Rate 119 BPM    Atrial Rate 119 BPM    P-R Interval 136 ms    QRS Duration 88 ms    Q-T Interval 320 ms    QTC Calculation(Bazett) 450 ms    P Axis 38 degrees    R Axis 35 degrees    T Axis 0 degrees    Diagnosis Line Sinus tachycardia  Otherwise normal ECG    Confirmed by HOUSTON ONEIL MD ([id number]) on 2022 8:52:22 AM (22 @ 03:20)  12 Lead ECG:   Ventricular Rate 128 BPM    Atrial Rate 128 BPM    P-R Interval 148 ms    QRS Duration 90 ms    Q-T Interval 306 ms    QTC Calculation(Bazett) 446 ms    P Axis 47 degrees    R Axis 102 degrees    T Axis -5 degrees    Diagnosis Line Sinus tachycardia  Rightward axis  ST & T wave abnormality, consider inferior ischemia  Abnormal ECG    Confirmed by Irene aPlma MD ([id number]) on 2022 6:43:54 AM (22 @ 17:08)      MEDICATIONS  amLODIPine   Tablet 10 Oral daily  atorvastatin 40 Oral at bedtime  cefTRIAXone   IVPB 2000 IV Intermittent every 12 hours  chlorhexidine 4% Liquid 1 Topical <User Schedule>  dextrose 5%. 1000 IV Continuous <Continuous>  dextrose 5%. 1000 IV Continuous <Continuous>  dextrose 50% Injectable 25 IV Push once  dextrose 50% Injectable 12.5 IV Push once  dextrose 50% Injectable 25 IV Push once  enoxaparin Injectable 40 SubCutaneous every 24 hours  furosemide   Injectable 40 IV Push daily  glucagon  Injectable 1 IntraMuscular once  insulin glargine Injectable (LANTUS) 15 SubCutaneous at bedtime  insulin lispro (ADMELOG) corrective regimen sliding scale  SubCutaneous three times a day before meals  insulin lispro Injectable (ADMELOG) 6 SubCutaneous Before meals and at bedtime  insulin regular Infusion 1 IV Continuous <Continuous>  levoFLOXacin IVPB 750 IV Intermittent every 24 hours  lisinopril 40 Oral daily  magnesium sulfate  IVPB 2 IV Intermittent every 2 hours  metoprolol succinate ER 50 Oral daily  pantoprazole    Tablet 40 Oral before breakfast  PARoxetine 30 Oral daily  sodium chloride 0.9%. 1000 IV Continuous <Continuous>      Weight  Weight (kg): 105.2 (22 @ 17:00)    ANTIBIOTICS:  cefTRIAXone   IVPB 2000 milliGRAM(s) IV Intermittent every 12 hours  levoFLOXacin IVPB 750 milliGRAM(s) IV Intermittent every 24 hours      ALLERGIES:  No Known Allergies

## 2022-04-20 NOTE — CONSULT NOTE ADULT - SUBJECTIVE AND OBJECTIVE BOX
Neurology Consult Note    HPI:  68 yo M w/ PMHx CAD s/p stent , DM, HTN, HLD, presenting to the ED for evaluation of nausea, vomiting, weakness for the past two days. Pt reports yesterday had few episodes of non-bloody non-bilious vomiting. Patient states today he was very weak and unable to tolerate po which prompted him to come to ED. Also reports watery diarrhea without hematochezia. Denies any sick contacts. Denies fever, chills, CP, SOB, coughing, dysuria. Reports compliance with meds including his diabetes meds but was not able to take them due to N/V.       Interval History:    Patient seen and examined at bedside. There were no acute events overnight. Patient states he feels okay, still says he has brain fog and difficulty concentrating but otherwise denies any other complaints.       PAST MEDICAL & SURGICAL HISTORY:  CAD S/P percutaneous coronary angioplasty  HTN (hypertension)  Diabetes mellitus  Hyperlipidemia  Anxiety  No significant past surgical history      Medications:  acetaminophen     Tablet .. 650 milliGRAM(s) Oral every 6 hours PRN  acetaminophen  Suppository .. 650 milliGRAM(s) Rectal every 6 hours PRN  aluminum hydroxide/magnesium hydroxide/simethicone Suspension 30 milliLiter(s) Oral every 4 hours PRN  amLODIPine   Tablet 10 milliGRAM(s) Oral daily  atorvastatin 40 milliGRAM(s) Oral at bedtime  cefTRIAXone   IVPB 2000 milliGRAM(s) IV Intermittent every 12 hours  chlorhexidine 4% Liquid 1 Application(s) Topical <User Schedule>  dextrose 5%. 1000 milliLiter(s) IV Continuous <Continuous>  dextrose 5%. 1000 milliLiter(s) IV Continuous <Continuous>  dextrose 50% Injectable 25 Gram(s) IV Push once  dextrose 50% Injectable 12.5 Gram(s) IV Push once  dextrose 50% Injectable 25 Gram(s) IV Push once  dextrose Oral Gel 15 Gram(s) Oral once PRN  enoxaparin Injectable 40 milliGRAM(s) SubCutaneous every 24 hours  furosemide   Injectable 40 milliGRAM(s) IV Push daily  glucagon  Injectable 1 milliGRAM(s) IntraMuscular once  insulin glargine Injectable (LANTUS) 15 Unit(s) SubCutaneous at bedtime  insulin lispro (ADMELOG) corrective regimen sliding scale   SubCutaneous three times a day before meals  insulin lispro Injectable (ADMELOG) 6 Unit(s) SubCutaneous Before meals and at bedtime  insulin regular Infusion 5 Unit(s)/Hr IV Continuous <Continuous>  levoFLOXacin IVPB 750 milliGRAM(s) IV Intermittent every 24 hours  lisinopril 40 milliGRAM(s) Oral daily  metoprolol succinate ER 50 milliGRAM(s) Oral daily  ondansetron Injectable 4 milliGRAM(s) IV Push every 8 hours PRN  pantoprazole    Tablet 40 milliGRAM(s) Oral before breakfast  PARoxetine 30 milliGRAM(s) Oral daily  sodium chloride 0.9%. 1000 milliLiter(s) IV Continuous <Continuous>      Vital Signs Last 24 Hrs  T(C): 37.8 (2022 12:00), Max: 39.3 (2022 18:00)  T(F): 100 (2022 12:00), Max: 102.8 (2022 18:00)  HR: 110 (2022 13:00) (94 - 132)  BP: 148/73 (2022 13:00) (118/71 - 183/91)  BP(mean): 96 (2022 13:00) (75 - 124)  RR: 26 (2022 13:00) (13 - 29)  SpO2: 96% (2022 13:00) (89% - 98%)      Neurological Exam:   Mental status: Awake, alert and oriented x3 to person, place and time. Recent and remote memory intact. Naming, repetition and comprehension intact. Attention/concentration intact but patient is slow and deliberate to answer some questions. No dysarthria, no aphasia. Fund of knowledge appropriate.    Cranial nerves: Pupils equally round and reactive to light, visual fields full, no nystagmus, extraocular muscles intact, V1 through V3 intact bilaterally and symmetric, face symmetric, hearing intact to finger rub, palate elevation symmetric, tongue was midline.  Motor:  MRC grading 5/5 B/L UE, 4+/5 B/L LE.  strength 5/5. Normal tone and bulk. No abnormal movements.    Sensation: Not intact to vibration in B/L LE. Intact to light touch, proprioception, and pinprick throughout.   Coordination: No dysmetria on finger-to-nose and heel-to-shin.  Reflexes: 2+ in bilateral UE/LE, downgoing toes bilaterally.  Gait: Deferred.      Labs:  CBC Full  -  ( 2022 05:30 )  WBC Count : 10.47 K/uL  RBC Count : 3.61 M/uL  Hemoglobin : 11.2 g/dL  Hematocrit : 31.7 %  Platelet Count - Automated : 195 K/uL  Mean Cell Volume : 87.8 fL  Mean Cell Hemoglobin : 31.0 pg  Mean Cell Hemoglobin Concentration : 35.3 g/dL          132<L>  |  94<L>  |  16  ----------------------------<  249<H>  3.3<L>   |  27  |  1.1    Ca    8.8      2022 11:00  Phos  2.1       Mg     1.6         TPro  6.5  /  Alb  3.8  /  TBili  0.8  /  DBili  x   /  AST  18  /  ALT  17  /  AlkPhos  72      LIVER FUNCTIONS - ( 2022 05:30 )  Alb: 3.8 g/dL / Pro: 6.5 g/dL / ALK PHOS: 72 U/L / ALT: 17 U/L / AST: 18 U/L / GGT: x           PT/INR - ( 2022 15:27 )   PT: 15.60 sec;   INR: 1.36 ratio      PTT - ( 2022 15:27 )  PTT:29.6 sec  Urinalysis Basic - ( 2022 16:30 )    Color: Yellow / Appearance: Clear / S.021 / pH: x  Gluc: x / Ketone: Small  / Bili: Negative / Urobili: 3 mg/dL   Blood: x / Protein: 100 mg/dL / Nitrite: Negative   Leuk Esterase: Negative / RBC: 5 /HPF / WBC 1 /HPF   Sq Epi: x / Non Sq Epi: 5 /HPF / Bacteria: Negative      < from: CT Head No Cont (22 @ 16:04) >  CT SCAN OF THE BRAIN WITHOUT CONTRAST    TECHNIQUE:    Multiple transaxial noncontrast CT images of the brain were obtained from   base to vertex. Sagittal and coronal reformatted images were obtained.    FINDINGS:    Motion artifact limits the examination.    The third and lateral ventricles are mildly enlarged as are the cortical   sulci consistent with a mild degree of cortical atrophy.  The fourth   ventricle is normal in size and position.    There is no shift of the midline structures, evidence of acute   intracranial hemorrhage, or depressed skull fracture.    Patchy foci of diminished attenuation in the periventricular white   matter. These findings are nonspecific in appearance and differential   diagnostic possibilities include ischemic change of undetermined age,   foci of gliosis or demyelination.    Mucosal thickening in the sphenoid sinuses.    Opacification of the mastoid tips likely secondary to inflammation or   infection.    Vascular calcifications are noted.    IMPRESSION:    1. Cerebral atrophy.    2. Periventricular hypodensities, nonspecific, differential diagnostic   possibilities include ischemic change, gliosis or demyelination.       Neurology Consult Note    HPI:  66 yo M w/ PMHx CAD s/p stent , DM, HTN, HLD, presenting to the ED for evaluation of nausea, vomiting, weakness for the past two days. Pt reports yesterday had few episodes of non-bloody non-bilious vomiting. Patient states today he was very weak and unable to tolerate po which prompted him to come to ED. Also reports watery diarrhea without hematochezia. Denies any sick contacts. Denies fever, chills, CP, SOB, coughing, dysuria. Reports compliance with meds including his diabetes meds but was not able to take them due to N/V.       Interval History:    Patient seen and examined at bedside. There were no acute events overnight. Patient states he feels okay, still says he has brain fog and difficulty concentrating but otherwise denies any other complaints.       PAST MEDICAL & SURGICAL HISTORY:  CAD S/P percutaneous coronary angioplasty  HTN (hypertension)  Diabetes mellitus  Hyperlipidemia  Anxiety  No significant past surgical history      Medications:  acetaminophen     Tablet .. 650 milliGRAM(s) Oral every 6 hours PRN  acetaminophen  Suppository .. 650 milliGRAM(s) Rectal every 6 hours PRN  aluminum hydroxide/magnesium hydroxide/simethicone Suspension 30 milliLiter(s) Oral every 4 hours PRN  amLODIPine   Tablet 10 milliGRAM(s) Oral daily  atorvastatin 40 milliGRAM(s) Oral at bedtime  cefTRIAXone   IVPB 2000 milliGRAM(s) IV Intermittent every 12 hours  chlorhexidine 4% Liquid 1 Application(s) Topical <User Schedule>  dextrose 5%. 1000 milliLiter(s) IV Continuous <Continuous>  dextrose 5%. 1000 milliLiter(s) IV Continuous <Continuous>  dextrose 50% Injectable 25 Gram(s) IV Push once  dextrose 50% Injectable 12.5 Gram(s) IV Push once  dextrose 50% Injectable 25 Gram(s) IV Push once  dextrose Oral Gel 15 Gram(s) Oral once PRN  enoxaparin Injectable 40 milliGRAM(s) SubCutaneous every 24 hours  furosemide   Injectable 40 milliGRAM(s) IV Push daily  glucagon  Injectable 1 milliGRAM(s) IntraMuscular once  insulin glargine Injectable (LANTUS) 15 Unit(s) SubCutaneous at bedtime  insulin lispro (ADMELOG) corrective regimen sliding scale   SubCutaneous three times a day before meals  insulin lispro Injectable (ADMELOG) 6 Unit(s) SubCutaneous Before meals and at bedtime  insulin regular Infusion 5 Unit(s)/Hr IV Continuous <Continuous>  levoFLOXacin IVPB 750 milliGRAM(s) IV Intermittent every 24 hours  lisinopril 40 milliGRAM(s) Oral daily  metoprolol succinate ER 50 milliGRAM(s) Oral daily  ondansetron Injectable 4 milliGRAM(s) IV Push every 8 hours PRN  pantoprazole    Tablet 40 milliGRAM(s) Oral before breakfast  PARoxetine 30 milliGRAM(s) Oral daily  sodium chloride 0.9%. 1000 milliLiter(s) IV Continuous <Continuous>      Vital Signs Last 24 Hrs  T(C): 37.8 (2022 12:00), Max: 39.3 (2022 18:00)  T(F): 100 (2022 12:00), Max: 102.8 (2022 18:00)  HR: 110 (2022 13:00) (94 - 132)  BP: 148/73 (2022 13:00) (118/71 - 183/91)  BP(mean): 96 (2022 13:00) (75 - 124)  RR: 26 (2022 13:00) (13 - 29)  SpO2: 96% (2022 13:00) (89% - 98%)      Neurological Exam:   Mental status: Awake, alert and oriented x3 to person, place and time. Recent and remote memory intact. Naming, repetition and comprehension intact. Attention/concentration intact but patient is slow and deliberate to answer some questions. remembers date and that it's his son's birthday.  Recalls war in [place].  able to name last 4 presidents.  knows he's in hospital for "kidney issues."  No dysarthria, no aphasia. Fund of knowledge appropriate.    Cranial nerves: Pupils equally round and reactive to light, visual fields full, no nystagmus, extraocular muscles intact, V1 through V3 intact bilaterally and symmetric, face symmetric, hearing intact to finger rub, palate elevation symmetric, tongue was midline.  Motor:  MRC grading 5/5 B/L UE, 4+/5 B/L LE.  strength 5/5. Normal tone and bulk. No abnormal movements.    Sensation: Not intact to vibration in B/L LE. Intact to light touch, proprioception, and pinprick throughout.   Coordination: No dysmetria on finger-to-nose and heel-to-shin.  Reflexes: 2+ in bilateral UE/LE, downgoing toes bilaterally.  Gait: Deferred.      Labs:  CBC Full  -  ( 2022 05:30 )  WBC Count : 10.47 K/uL  RBC Count : 3.61 M/uL  Hemoglobin : 11.2 g/dL  Hematocrit : 31.7 %  Platelet Count - Automated : 195 K/uL  Mean Cell Volume : 87.8 fL  Mean Cell Hemoglobin : 31.0 pg  Mean Cell Hemoglobin Concentration : 35.3 g/dL          132<L>  |  94<L>  |  16  ----------------------------<  249<H>  3.3<L>   |  27  |  1.1    Ca    8.8      2022 11:00  Phos  2.1       Mg     1.6         TPro  6.5  /  Alb  3.8  /  TBili  0.8  /  DBili  x   /  AST  18  /  ALT  17  /  AlkPhos  72  -20    LIVER FUNCTIONS - ( 2022 05:30 )  Alb: 3.8 g/dL / Pro: 6.5 g/dL / ALK PHOS: 72 U/L / ALT: 17 U/L / AST: 18 U/L / GGT: x           PT/INR - ( 2022 15:27 )   PT: 15.60 sec;   INR: 1.36 ratio      PTT - ( 2022 15:27 )  PTT:29.6 sec  Urinalysis Basic - ( 2022 16:30 )    Color: Yellow / Appearance: Clear / S.021 / pH: x  Gluc: x / Ketone: Small  / Bili: Negative / Urobili: 3 mg/dL   Blood: x / Protein: 100 mg/dL / Nitrite: Negative   Leuk Esterase: Negative / RBC: 5 /HPF / WBC 1 /HPF   Sq Epi: x / Non Sq Epi: 5 /HPF / Bacteria: Negative      < from: CT Head No Cont (22 @ 16:04) >  CT SCAN OF THE BRAIN WITHOUT CONTRAST    TECHNIQUE:    Multiple transaxial noncontrast CT images of the brain were obtained from   base to vertex. Sagittal and coronal reformatted images were obtained.    FINDINGS:    Motion artifact limits the examination.    The third and lateral ventricles are mildly enlarged as are the cortical   sulci consistent with a mild degree of cortical atrophy.  The fourth   ventricle is normal in size and position.    There is no shift of the midline structures, evidence of acute   intracranial hemorrhage, or depressed skull fracture.    Patchy foci of diminished attenuation in the periventricular white   matter. These findings are nonspecific in appearance and differential   diagnostic possibilities include ischemic change of undetermined age,   foci of gliosis or demyelination.    Mucosal thickening in the sphenoid sinuses.    Opacification of the mastoid tips likely secondary to inflammation or   infection.    Vascular calcifications are noted.    IMPRESSION:    1. Cerebral atrophy.    2. Periventricular hypodensities, nonspecific, differential diagnostic   possibilities include ischemic change, gliosis or demyelination.

## 2022-04-20 NOTE — PROGRESS NOTE ADULT - ASSESSMENT
66 y/o M w/ PMHx cad s/p stent 2008, DM, HTN, HLD, presenting to the ED for evaluation of nausea, vomiting, weakness for the past two days. Admitted for mild DKA.    #Sepsis present on admission 2/2 severe CAP  #Mild hypoxia  - Patient w/ ~4 PPD smoking hx x40 years, quit 2009  - WBC 13.92, lactate 2.0 on admission  - K 3.4, AG 21, BHB 1.6, glucose 306  - BNP 1609  - Procal 0.17  - C/w incentive spirometry  - C/w HFNC/NIV, wean as tolerated  - Will need outpatient PFTs  - D/c acyclovir, ampicillin, vancomycin  - Start Levaquin 750 mg IV q24h  - C/w Rocephin 2g IV q12h  - F/u BCx, UCx, sputum cx  - F/u ID consult    #AMS 2/2 ?toxic metabolic encephalopathy  - Patient initially drowsy and confused, now improved but still w unclear source  - In the setting of fever, consider LP  - MRI noncon  - F/u neuro consult    #N/V/D likely secondary to mild compensated DKA  #HAGMA w/ metabolic alkalosis  #SIRS + on admission likely secondary to DKA (leukocytosis, tachycardia)    - delta/delta > 9 (HAGMA from DKA, metabolic alkalosis from contraction)   - Chest xray Linear opacity overlies the right midlung field.  - CT AB/P No evidence of abdominal or pelvic mass or inflammatory process  - R/o infectious etiology   - A1c: 7.2   - C/w incentive spirometry  - LR 100cc/hr, zofran prn  - Increase Sq insulin regimen 15/6/6/6   - Monitor BMP   - clear liquid diet advance as tolerated   - Started meningoencephalitis coverage with ampicillin, ceftriaxone, vancomycin and acyclovir   - D-dimer 208  -   - F/u RVP  - F/u UA, Ucx   - F/u Bcx   - F/u procal   - F/u CT head, pending LP afterwards   - Coags sent, f/u before LP     #Hypertensive urgency -- improving  - Did not take PO meds AM of admission 2/2 nausea/vomiting  - Increase amlodipine from 5mg to 10mg, lisinopril 40mg (benazepril)   - hold HCTZ 25mg qd due to contraction alkalosis     #CAD s/p stent 2008  #HLD  - Takes Metoprolol succinate 50mg BID, was confirmed with mail order pharmacy.  - atorvastatin 40mg hs    #Diabetic neuropathy  - c/w gabapentin    #Anxiety/depression  - c/w paxil 30mg daily     #DVT ppx: Lovenox  - GI prophylaxis: PPI  - Diet: clear liquid diet   - Code Status: DNR/DNI  - Disposition: Upgrade to MICU  68 y/o M w/ PMHx cad s/p stent 2008, DM, HTN, HLD, presenting to the ED for evaluation of nausea, vomiting, weakness for the past two days. Admitted for mild DKA.    #Sepsis present on admission 2/2 suspected severe CAP  #Mild hypoxia  - Patient w/ ~4 PPD smoking hx x40 years, quit 2009  - WBC 13.92, lactate 2.0 on admission  - Chest xray Linear opacity overlies the right midlung field  - CT abd/pelvis: no sign of inflammation or infection  - BNP 1609  - Procal 0.17  - RVP: (-)  - D-dimer: 343  - CK: 112  - C/w incentive spirometry  - C/w HFNC/NIV, wean as tolerated  - Will need outpatient PFTs  - D/c acyclovir, ampicillin, vancomycin  - Hold off on LP for now  - Start Levaquin 750 mg IV q24h  - C/w Rocephin 2g IV q12h  - F/u BCx, UCx, sputum cx  - F/u ID consult    #AMS 2/2 ?toxic metabolic encephalopathy  - Patient initially drowsy and confused, now improved but still w unclear source  - In the setting of fever, consider LP  - MRI noncon -- patient had pins in his R arm ~15 years ago, f/u w radiology if any way to see if compatible  - F/u neuro consult    #N/V/D likely secondary to mild compensated DKA  #HAGMA w/ metabolic alkalosis  #SIRS + on admission likely secondary to DKA (leukocytosis, tachycardia)  - A1c: 7.3%  - K 3.4, AG 21, BHB 1.6, glucose 306  - C/w insulin gtt and SC insulin  - F/u RTC BMPs    #Hypertensive urgency -- improving  - Did not take PO meds AM of admission 2/2 nausea/vomiting  - Hold home HCTZ 2/2 contraction alkalosis  - C/w amlodipine 10 mg PO daily  - C/w lisinopril 40 mg PO daily    #CAD s/p stent 2008  #HLD  - Takes Metoprolol succinate 50mg BID, was confirmed with mail order pharmacy.  - C/w Lipitor 40 mg PO qhs    #Anxiety/depression  - C/w Paxil 30 mg PO daily    #DVT ppx: Lovenox 40 mg SC daily  #GI ppx: Protonix 40 mg PO daily  #Diet: Clear liquid diet   #Activity: IAT  #Dispo: From home, MICU  #Code status: DNR/DNI  - [facility]  68 y/o M w/ PMHx cad s/p stent 2008, DM, HTN, HLD, presenting to the ED for evaluation of nausea, vomiting, weakness for the past two days. Admitted for mild DKA.    #Sepsis present on admission 2/2 suspected severe CAP  #Mild hypoxia  - Patient w/ ~4 PPD smoking hx x40 years, quit 2009  - WBC 13.92, lactate 2.0 on admission  - Chest xray Linear opacity overlies the right midlung field  - CT abd/pelvis: no sign of inflammation or infection  - BNP 1609  - Procal 0.17  - RVP: (-)  - D-dimer: 343  - CK: 112  - C/w incentive spirometry  - C/w HFNC/NIV, wean as tolerated  - Will need outpatient PFTs  - D/c acyclovir, ampicillin, vancomycin  - Hold off on LP for now  - Start Levaquin 750 mg IV q24h  - C/w Rocephin 2g IV q12h  - F/u BCx, UCx, sputum cx  - F/u ID consult    #AMS 2/2 ?toxic metabolic encephalopathy  - Patient initially drowsy and confused, now improved but still w unclear source  - In the setting of fever, consider LP  - MRI noncon -- patient had pins in his R arm ~15 years ago, f/u w radiology if any way to see if compatible  - F/u neuro consult    #N/V/D likely secondary to mild compensated DKA  #HAGMA w/ metabolic alkalosis  #SIRS + on admission likely secondary to DKA (leukocytosis, tachycardia)  - A1c: 7.3%  - K 3.4, AG 21, BHB 1.6, glucose 306  - C/w insulin gtt  - F/u RTC BMPs    #Hypertensive urgency -- improving  - Did not take PO meds AM of admission 2/2 nausea/vomiting  - Hold home HCTZ 2/2 contraction alkalosis  - C/w amlodipine 10 mg PO daily  - C/w lisinopril 40 mg PO daily (on benazapril at home)    #CAD s/p stent 2008  #HLD  - C/w Toprol 50 mg PO BID -- dosing/frequency confirmed w mail order pharmacy  - C/w Lipitor 40 mg PO qhs    #Anxiety/depression  - C/w Paxil 30 mg PO daily    #DVT ppx: Lovenox 40 mg SC daily  #GI ppx: Protonix 40 mg PO daily  #Diet: Clear liquid diet   #Activity: IAT  #Dispo: From home, MICU  #Code status: DNR/DNI

## 2022-04-20 NOTE — PROGRESS NOTE ADULT - ATTENDING COMMENTS
IMPRESSION:    HAGMA with Metabolic alkalosis   Respiratory alkalosis   HO DM with diabetic neuropathy  Mild DKA     Plan as outlined above
My note supersedes the resident's note in the event of a discrepancy -    Patient seen and examined this morning with his son, Angel, bedside  patient is awake and alert but falls asleep easily  denies any specific complaints but states he is tired    Vital Signs Last 24 Hrs  T(C): 36.9 (19 Apr 2022 11:57), Max: 37.7 (18 Apr 2022 18:35)  T(F): 98.4 (19 Apr 2022 11:57), Max: 99.8 (18 Apr 2022 18:35)  HR: 117 (19 Apr 2022 11:57) (110 - 125)  BP: 128/60 (19 Apr 2022 11:57) (128/60 - 205/109)  BP(mean): 86 (19 Apr 2022 11:57) (86 - 117)  RR: 18 (19 Apr 2022 11:57) (18 - 20)  SpO2: 94% (19 Apr 2022 04:00) (90% - 97%) on nc    68yo M w/ pmhx cad s/p stent 2008, DM, HTN, HLD, presenting to the ED for evaluation of nausea, vomiting, weakness for the past two days. Admitted for mild DKA.    #Metabolic encephalopathy  #N/V/D likely secondary to mild compensated DKA  #HAGMA w/ metabolic alkalosis  #suspected magnesium deficiency   - rule out infectious etiology - follow up cultures, RVP  - follow up CT head  - send ua and culture - ordered 5 times but not yet sent   - check pt/ptt - if ok - LP to rule out encephalitis/meningitis  - started on ampicillin, rocephin, vanco and acyclovir   - continue IVF  - clear liquid diet and advance as tolerated when completely awake  - continue insulin coverage of 15/6/6/6  - HbA1c = 7.2  - follow up procal     #suspected copd/roxanna  - outpatient sleep study  - continue oxygen support with nasal cannula   - patient has significant smoking history; quit 4 years ago   - repeat cxr in am     #Hypertensive urgency due to noncompliance  #CAD s/p stent 2008/ DL  - norvasc increased to 10mg today  - continue ace, metoprolol, statin   - holding hctz due to contraction alkalosis     #Anxiety/depression  - on paxil    #Neuropathy  -hold gabapentin    GOC - DNR/DNI    Progress Note Handoff  Pending Consults: ICU  Pending Tests: labs, culture, ua, ct head   Pending Results: labs, cultures, ua, ct head  Family Discussion: Discussed all tests and plan of care with pt and his son, Angel, bedside. Upgrade to ICU now.   Disposition: Home_____/SNF______/Other_____/Unknown at this time_____  Spent over 45 min reviewing chart and on coordinating patient care during interdisciplinary rounds

## 2022-04-20 NOTE — CONSULT NOTE ADULT - ASSESSMENT
68 yo M w/ PMHx CAD s/p stent 2008, DM, HTN, HLD, presenting to the ED for evaluation of nausea, vomiting, weakness for the past two days. Patient has had periods of confusion and brain fog while in ICU. Neurology was consulted for recommendations. Likely due to hyperglycemia electrolyte abnormalities, uncontrolled hypertension on admission.    Plan:  - CT Head reviewed, cerebral atrophy noted and periventricular hypodensities, nonspecific in origin.  - Recommend MRI Brain w/o, will f/u  - Correct electrolyte abnormalities  - K > 4, Mg > 2, replete prn  - Neurology will follow  - Continue rest of care as per primary team 66 yo M w/ PMHx CAD s/p stent 2008, DM, HTN, HLD, presenting to the ED for evaluation of nausea, vomiting, weakness for the past two days. Patient has had periods of confusion and brain fog while in ICU. Neurology was consulted for recommendations. Patient is AAOx3, states ongoing difficulty concentrating but is oriented. Likely due to ongoing medical issues which are being managed including hyperglycemia, metabolic / electrolyte abnormalities, uncontrolled hypertension which was on admission.    Plan:  - CT Head reviewed, cerebral atrophy noted and periventricular hypodensities, nonspecific in origin.  - Recommend MRI Brain w/o, will f/u  - Correct electrolyte abnormalities  - K > 4, Mg > 2, replete prn  - Neurology will follow  - Continue rest of care as per primary team 66 yo M w/ PMHx CAD s/p stent 2008, DM, HTN, HLD, presenting to the ED for evaluation of nausea, vomiting, weakness for the past two days. Patient has had periods of confusion and brain fog while in ICU. Neurology was consulted for recommendations. Patient is AAOx3, states ongoing difficulty concentrating but is oriented. Likely due to ongoing medical issues which are being managed including hyperglycemia, metabolic / electrolyte abnormalities, uncontrolled hypertension which was on admission vs seizures less likely.    Plan:  - CT Head reviewed, cerebral atrophy noted and periventricular hypodensities, nonspecific in origin.  - Recommend MRI Brain w/o, will f/u  - Recommend REEG to r/o seizures, will f/u  - Correct electrolyte abnormalities  - K > 4, Mg > 2, replete prn  - Neurology will follow  - Continue rest of care as per primary team

## 2022-04-20 NOTE — PROGRESS NOTE ADULT - SUBJECTIVE AND OBJECTIVE BOX
PATIENT:  ARTURO LINDER  MRN-[id number]    Patient is a 67y old  Male who presents with a chief complaint of hyperglycemia (2022 10:23)      INTERVAL HPI/OVERNIGHT EVENTS:  Patient seen and examined at bedside. Upgraded from CEU yesterday for worsening lethargy. Overnight patient had increased WOB so he was placed on BIPAP.    Today patient feels overall improved but still not completely back to baseline. Per family at bedside, he still is "off" but they agree he is improved.      ICU Vital Signs Last 24 Hrs  T(C): 37.8 (2022 12:00), Max: 39.3 (2022 18:00)  T(F): 100 (2022 12:00), Max: 102.8 (2022 18:00)  HR: 106 (2022 11:00) (94 - 132)  BP: 159/72 (2022 11:00) (118/71 - 183/91)  BP(mean): 97 (2022 11:00) (75 - 124)  RR: 21 (2022 11:00) (13 - 29)  SpO2: 95% (2022 11:00) (89% - 98%)    I&O's Summary    2022 07:  -  2022 07:00  --------------------------------------------------------  IN: 1150 mL / OUT: 0 mL / NET: 1150 mL    2022 07:01  -  2022 11:37  --------------------------------------------------------  IN: 527 mL / OUT: 230 mL / NET: 297 mL      LABS:                        11.2   10.47 )-----------( 195      ( 2022 05:30 )             31.7     04-20    135  |  93<L>  |  16  ----------------------------<  281<H>  3.5   |  24  |  1.2    Ca    8.6      2022 05:30  Phos  2.1     04-19  Mg     1.6     -20    TPro  6.5  /  Alb  3.8  /  TBili  0.8  /  DBili  x   /  AST  18  /  ALT  17  /  AlkPhos  72  04-20    PT/INR - ( 2022 15:27 )   PT: 15.60 sec;   INR: 1.36 ratio         PTT - ( 2022 15:27 )  PTT:29.6 sec  Urinalysis Basic - ( 2022 16:30 )    Color: Yellow / Appearance: Clear / S.021 / pH: x  Gluc: x / Ketone: Small  / Bili: Negative / Urobili: 3 mg/dL  Blood: x / Protein: 100 mg/dL / Nitrite: Negative  Leuk Esterase: Negative / RBC: 5 /HPF / WBC 1 /HPF  Sq Epi: x / Non Sq Epi: 5 /HPF / Bacteria: Negative      CAPILLARY BLOOD GLUCOSE      POCT Blood Glucose.: 241 mg/dL (2022 11:15)  POCT Blood Glucose.: 303 mg/dL (2022 10:07)  POCT Blood Glucose.: 324 mg/dL (2022 08:26)  POCT Blood Glucose.: 248 mg/dL (2022 23:31)  POCT Blood Glucose.: 257 mg/dL (2022 18:30)  POCT Blood Glucose.: 241 mg/dL (2022 16:59)    ABG - ( 2022 02:04 )  pH, Arterial: 7.52  pH, Blood: x     /  pCO2: 35    /  pO2: 91    / HCO3: 29    / Base Excess: 5.7   /  SaO2: 99.1        RADIOLOGY & ADDITIONAL TESTS:    Consultant(s) Notes Reviewed:  [x ] YES  [ ] NO    MEDICATIONS  (STANDING):  amLODIPine   Tablet 10 milliGRAM(s) Oral daily  atorvastatin 40 milliGRAM(s) Oral at bedtime  cefTRIAXone   IVPB 2000 milliGRAM(s) IV Intermittent every 12 hours  chlorhexidine 4% Liquid 1 Application(s) Topical <User Schedule>  dextrose 5%. 1000 milliLiter(s) (50 mL/Hr) IV Continuous <Continuous>  dextrose 5%. 1000 milliLiter(s) (100 mL/Hr) IV Continuous <Continuous>  dextrose 50% Injectable 25 Gram(s) IV Push once  dextrose 50% Injectable 12.5 Gram(s) IV Push once  dextrose 50% Injectable 25 Gram(s) IV Push once  enoxaparin Injectable 40 milliGRAM(s) SubCutaneous every 24 hours  furosemide   Injectable 40 milliGRAM(s) IV Push daily  glucagon  Injectable 1 milliGRAM(s) IntraMuscular once  insulin glargine Injectable (LANTUS) 15 Unit(s) SubCutaneous at bedtime  insulin lispro (ADMELOG) corrective regimen sliding scale   SubCutaneous three times a day before meals  insulin lispro Injectable (ADMELOG) 6 Unit(s) SubCutaneous Before meals and at bedtime  insulin regular Infusion 3 Unit(s)/Hr (3 mL/Hr) IV Continuous <Continuous>  levoFLOXacin IVPB 750 milliGRAM(s) IV Intermittent every 24 hours  lisinopril 40 milliGRAM(s) Oral daily  magnesium sulfate  IVPB 2 Gram(s) IV Intermittent every 2 hours  metoprolol succinate ER 50 milliGRAM(s) Oral daily  pantoprazole    Tablet 40 milliGRAM(s) Oral before breakfast  PARoxetine 30 milliGRAM(s) Oral daily  sodium chloride 0.9%. 1000 milliLiter(s) (75 mL/Hr) IV Continuous <Continuous>    MEDICATIONS  (PRN):  acetaminophen     Tablet .. 650 milliGRAM(s) Oral every 6 hours PRN Temp greater or equal to 38C (100.4F), Mild Pain (1 - 3)  acetaminophen  Suppository .. 650 milliGRAM(s) Rectal every 6 hours PRN Temp greater or equal to 38C (100.4F), Mild Pain (1 - 3)  aluminum hydroxide/magnesium hydroxide/simethicone Suspension 30 milliLiter(s) Oral every 4 hours PRN Dyspepsia  dextrose Oral Gel 15 Gram(s) Oral once PRN Blood Glucose LESS THAN 70 milliGRAM(s)/deciliter  ondansetron Injectable 4 milliGRAM(s) IV Push every 8 hours PRN Nausea and/or Vomiting      PHYSICAL EXAM:  GENERAL: Resting comfortably in no acute distress on HFNC  HEAD:  Atraumatic, normocephalic  EYES: EOMI, PERRLA, conjunctiva and sclera clear  NECK: Supple, no JVD  CHEST/LUNG: Dec BS b/l  HEART: S1S2 normal, no S3, regular rate and rhythm, no murmurs  ABDOMEN: Soft, nontender, nondistended. Bowel sounds present  EXTREMITIES:  2+ peripheral pulses, no clubbing, cyanosis, or edema  SKIN: No rashes or lesions  NERVOUS SYSTEM: AAOx3 to person, place, and time    Care Discussed with Consultants/Other Providers [ x] YES  [ ] NO

## 2022-04-20 NOTE — PROGRESS NOTE ADULT - ASSESSMENT
IMPRESSION:  Acute hypoxemic respiratory failure   Severe CAP   HAGMA with Metabolic alkalosis   Respiratory alkalosis   HO DM with diabetic neuropathy  Mild DKA   AMS doubt meningitis     PLAN:    CNS: Avoid CNS depressants.  MRI NC.  Neuro eval.     HEENT: Oral care    PULMONARY:  HOB @ 45 degrees. Incentive spirometry.  Wean O2.      CARDIOVASCULAR: NS 75 cc per hour.         GI: GI prophylaxis.  Feeding if tolerated .     RENAL:  Follow up lytes stat.  Correct as needed. U Chloride     INFECTIOUS DISEASE: Follow up cultures.  Procal.  ID eval.  Rocephin and Levaquin for now.         HEMATOLOGICAL:  DVT prophylaxis. dimer noted.  Follow up duplex    ENDOCRINE:  Follow up FS. Insulin drip.  Hold Metformin     MUSCULOSKELETAL: Bed rest.  DW family at bed side     ICU

## 2022-04-21 LAB
ALBUMIN SERPL ELPH-MCNC: 3.5 G/DL — SIGNIFICANT CHANGE UP (ref 3.5–5.2)
ALP SERPL-CCNC: 70 U/L — SIGNIFICANT CHANGE UP (ref 30–115)
ALT FLD-CCNC: 30 U/L — SIGNIFICANT CHANGE UP (ref 0–41)
ANION GAP SERPL CALC-SCNC: 12 MMOL/L — SIGNIFICANT CHANGE UP (ref 7–14)
ANION GAP SERPL CALC-SCNC: 20 MMOL/L — HIGH (ref 7–14)
APPEARANCE UR: CLEAR — SIGNIFICANT CHANGE UP
AST SERPL-CCNC: 32 U/L — SIGNIFICANT CHANGE UP (ref 0–41)
BASOPHILS # BLD AUTO: 0.03 K/UL — SIGNIFICANT CHANGE UP (ref 0–0.2)
BASOPHILS NFR BLD AUTO: 0.3 % — SIGNIFICANT CHANGE UP (ref 0–1)
BILIRUB SERPL-MCNC: 0.6 MG/DL — SIGNIFICANT CHANGE UP (ref 0.2–1.2)
BILIRUB UR-MCNC: NEGATIVE — SIGNIFICANT CHANGE UP
BUN SERPL-MCNC: 15 MG/DL — SIGNIFICANT CHANGE UP (ref 10–20)
BUN SERPL-MCNC: 16 MG/DL — SIGNIFICANT CHANGE UP (ref 10–20)
CALCIUM SERPL-MCNC: 8.2 MG/DL — LOW (ref 8.5–10.1)
CALCIUM SERPL-MCNC: 8.3 MG/DL — LOW (ref 8.5–10.1)
CHLORIDE SERPL-SCNC: 95 MMOL/L — LOW (ref 98–110)
CHLORIDE SERPL-SCNC: 96 MMOL/L — LOW (ref 98–110)
CO2 SERPL-SCNC: 19 MMOL/L — SIGNIFICANT CHANGE UP (ref 17–32)
CO2 SERPL-SCNC: 27 MMOL/L — SIGNIFICANT CHANGE UP (ref 17–32)
COLOR SPEC: YELLOW — SIGNIFICANT CHANGE UP
CREAT SERPL-MCNC: 1 MG/DL — SIGNIFICANT CHANGE UP (ref 0.7–1.5)
CREAT SERPL-MCNC: 1.2 MG/DL — SIGNIFICANT CHANGE UP (ref 0.7–1.5)
CULTURE RESULTS: NO GROWTH — SIGNIFICANT CHANGE UP
DIFF PNL FLD: ABNORMAL
EGFR: 67 ML/MIN/1.73M2 — SIGNIFICANT CHANGE UP
EGFR: 83 ML/MIN/1.73M2 — SIGNIFICANT CHANGE UP
EOSINOPHIL # BLD AUTO: 0.08 K/UL — SIGNIFICANT CHANGE UP (ref 0–0.7)
EOSINOPHIL NFR BLD AUTO: 0.9 % — SIGNIFICANT CHANGE UP (ref 0–8)
EPI CELLS # UR: SIGNIFICANT CHANGE UP
GLUCOSE BLDC GLUCOMTR-MCNC: 162 MG/DL — HIGH (ref 70–99)
GLUCOSE BLDC GLUCOMTR-MCNC: 167 MG/DL — HIGH (ref 70–99)
GLUCOSE BLDC GLUCOMTR-MCNC: 178 MG/DL — HIGH (ref 70–99)
GLUCOSE BLDC GLUCOMTR-MCNC: 183 MG/DL — HIGH (ref 70–99)
GLUCOSE BLDC GLUCOMTR-MCNC: 184 MG/DL — HIGH (ref 70–99)
GLUCOSE BLDC GLUCOMTR-MCNC: 212 MG/DL — HIGH (ref 70–99)
GLUCOSE SERPL-MCNC: 132 MG/DL — HIGH (ref 70–99)
GLUCOSE SERPL-MCNC: 165 MG/DL — HIGH (ref 70–99)
GLUCOSE UR QL: NEGATIVE — SIGNIFICANT CHANGE UP
HCT VFR BLD CALC: 30.1 % — LOW (ref 42–52)
HGB BLD-MCNC: 10.6 G/DL — LOW (ref 14–18)
IMM GRANULOCYTES NFR BLD AUTO: 1.9 % — HIGH (ref 0.1–0.3)
KETONES UR-MCNC: NEGATIVE — SIGNIFICANT CHANGE UP
LEGIONELLA AG UR QL: NEGATIVE — SIGNIFICANT CHANGE UP
LEUKOCYTE ESTERASE UR-ACNC: NEGATIVE — SIGNIFICANT CHANGE UP
LYMPHOCYTES # BLD AUTO: 1.06 K/UL — LOW (ref 1.2–3.4)
LYMPHOCYTES # BLD AUTO: 12.1 % — LOW (ref 20.5–51.1)
MAGNESIUM SERPL-MCNC: 2.2 MG/DL — SIGNIFICANT CHANGE UP (ref 1.8–2.4)
MAGNESIUM SERPL-MCNC: 2.2 MG/DL — SIGNIFICANT CHANGE UP (ref 1.8–2.4)
MCHC RBC-ENTMCNC: 30.8 PG — SIGNIFICANT CHANGE UP (ref 27–31)
MCHC RBC-ENTMCNC: 35.2 G/DL — SIGNIFICANT CHANGE UP (ref 32–37)
MCV RBC AUTO: 87.5 FL — SIGNIFICANT CHANGE UP (ref 80–94)
MONOCYTES # BLD AUTO: 1.05 K/UL — HIGH (ref 0.1–0.6)
MONOCYTES NFR BLD AUTO: 12 % — HIGH (ref 1.7–9.3)
NEUTROPHILS # BLD AUTO: 6.37 K/UL — SIGNIFICANT CHANGE UP (ref 1.4–6.5)
NEUTROPHILS NFR BLD AUTO: 72.8 % — SIGNIFICANT CHANGE UP (ref 42.2–75.2)
NITRITE UR-MCNC: NEGATIVE — SIGNIFICANT CHANGE UP
NRBC # BLD: 0 /100 WBCS — SIGNIFICANT CHANGE UP (ref 0–0)
PH UR: 6.5 — SIGNIFICANT CHANGE UP (ref 5–8)
PLATELET # BLD AUTO: 180 K/UL — SIGNIFICANT CHANGE UP (ref 130–400)
POTASSIUM SERPL-MCNC: 3.8 MMOL/L — SIGNIFICANT CHANGE UP (ref 3.5–5)
POTASSIUM SERPL-MCNC: 4.7 MMOL/L — SIGNIFICANT CHANGE UP (ref 3.5–5)
POTASSIUM SERPL-SCNC: 3.8 MMOL/L — SIGNIFICANT CHANGE UP (ref 3.5–5)
POTASSIUM SERPL-SCNC: 4.7 MMOL/L — SIGNIFICANT CHANGE UP (ref 3.5–5)
PROT SERPL-MCNC: 6 G/DL — SIGNIFICANT CHANGE UP (ref 6–8)
PROT UR-MCNC: ABNORMAL
RBC # BLD: 3.44 M/UL — LOW (ref 4.7–6.1)
RBC # FLD: 12.7 % — SIGNIFICANT CHANGE UP (ref 11.5–14.5)
RBC CASTS # UR COMP ASSIST: 2 /HPF — SIGNIFICANT CHANGE UP (ref 0–4)
S PNEUM AG UR QL: NEGATIVE — SIGNIFICANT CHANGE UP
SODIUM SERPL-SCNC: 134 MMOL/L — LOW (ref 135–146)
SODIUM SERPL-SCNC: 135 MMOL/L — SIGNIFICANT CHANGE UP (ref 135–146)
SP GR SPEC: 1.02 — SIGNIFICANT CHANGE UP (ref 1.01–1.03)
SPECIMEN SOURCE: SIGNIFICANT CHANGE UP
UROBILINOGEN FLD QL: SIGNIFICANT CHANGE UP
WBC # BLD: 8.76 K/UL — SIGNIFICANT CHANGE UP (ref 4.8–10.8)
WBC # FLD AUTO: 8.76 K/UL — SIGNIFICANT CHANGE UP (ref 4.8–10.8)

## 2022-04-21 PROCEDURE — 71045 X-RAY EXAM CHEST 1 VIEW: CPT | Mod: 26

## 2022-04-21 PROCEDURE — 99233 SBSQ HOSP IP/OBS HIGH 50: CPT

## 2022-04-21 PROCEDURE — 70551 MRI BRAIN STEM W/O DYE: CPT | Mod: 26

## 2022-04-21 RX ORDER — INSULIN LISPRO 100/ML
8 VIAL (ML) SUBCUTANEOUS
Refills: 0 | Status: DISCONTINUED | OUTPATIENT
Start: 2022-04-21 | End: 2022-04-25

## 2022-04-21 RX ORDER — INSULIN LISPRO 100/ML
VIAL (ML) SUBCUTANEOUS
Refills: 0 | Status: DISCONTINUED | OUTPATIENT
Start: 2022-04-21 | End: 2022-04-25

## 2022-04-21 RX ORDER — INSULIN GLARGINE 100 [IU]/ML
17 INJECTION, SOLUTION SUBCUTANEOUS AT BEDTIME
Refills: 0 | Status: DISCONTINUED | OUTPATIENT
Start: 2022-04-21 | End: 2022-04-21

## 2022-04-21 RX ORDER — INSULIN GLARGINE 100 [IU]/ML
17 INJECTION, SOLUTION SUBCUTANEOUS AT BEDTIME
Refills: 0 | Status: DISCONTINUED | OUTPATIENT
Start: 2022-04-21 | End: 2022-04-25

## 2022-04-21 RX ADMIN — Medication 1: at 16:51

## 2022-04-21 RX ADMIN — Medication 50 MILLIGRAM(S): at 19:02

## 2022-04-21 RX ADMIN — Medication 8 UNIT(S): at 08:34

## 2022-04-21 RX ADMIN — ATORVASTATIN CALCIUM 40 MILLIGRAM(S): 80 TABLET, FILM COATED ORAL at 23:07

## 2022-04-21 RX ADMIN — LISINOPRIL 40 MILLIGRAM(S): 2.5 TABLET ORAL at 06:53

## 2022-04-21 RX ADMIN — Medication 50 MILLIGRAM(S): at 06:53

## 2022-04-21 RX ADMIN — INSULIN GLARGINE 17 UNIT(S): 100 INJECTION, SOLUTION SUBCUTANEOUS at 01:26

## 2022-04-21 RX ADMIN — INSULIN GLARGINE 17 UNIT(S): 100 INJECTION, SOLUTION SUBCUTANEOUS at 23:07

## 2022-04-21 RX ADMIN — CHLORHEXIDINE GLUCONATE 1 APPLICATION(S): 213 SOLUTION TOPICAL at 06:54

## 2022-04-21 RX ADMIN — CEFTRIAXONE 100 MILLIGRAM(S): 500 INJECTION, POWDER, FOR SOLUTION INTRAMUSCULAR; INTRAVENOUS at 23:06

## 2022-04-21 RX ADMIN — Medication 2: at 11:34

## 2022-04-21 RX ADMIN — Medication 1: at 08:34

## 2022-04-21 RX ADMIN — Medication 30 MILLIGRAM(S): at 11:35

## 2022-04-21 RX ADMIN — PANTOPRAZOLE SODIUM 40 MILLIGRAM(S): 20 TABLET, DELAYED RELEASE ORAL at 06:53

## 2022-04-21 RX ADMIN — Medication 8 UNIT(S): at 11:33

## 2022-04-21 RX ADMIN — Medication 8 UNIT(S): at 16:51

## 2022-04-21 RX ADMIN — AMLODIPINE BESYLATE 10 MILLIGRAM(S): 2.5 TABLET ORAL at 06:52

## 2022-04-21 RX ADMIN — ENOXAPARIN SODIUM 40 MILLIGRAM(S): 100 INJECTION SUBCUTANEOUS at 06:52

## 2022-04-21 RX ADMIN — CEFTRIAXONE 100 MILLIGRAM(S): 500 INJECTION, POWDER, FOR SOLUTION INTRAMUSCULAR; INTRAVENOUS at 06:53

## 2022-04-21 RX ADMIN — Medication 40 MILLIGRAM(S): at 06:52

## 2022-04-21 NOTE — PHYSICAL THERAPY INITIAL EVALUATION ADULT - GAIT DEVIATIONS NOTED, PT EVAL
decreased kirk/increased time in double stance/decreased velocity of limb motion/decreased step length/decreased stride length

## 2022-04-21 NOTE — PHYSICAL THERAPY INITIAL EVALUATION ADULT - BED MOBILITY TRAINING, PT EVAL
Goal: pt will come supine to sit to supine with contact guard by discharge to facilitate return to PLOF.

## 2022-04-21 NOTE — PHYSICAL THERAPY INITIAL EVALUATION ADULT - TRANSFER TRAINING, PT EVAL
Goal: Pt will come sit to stand to sit with min assist of 1 person by discharge to facilitate return to PLOF.

## 2022-04-21 NOTE — PHYSICAL THERAPY INITIAL EVALUATION ADULT - PERTINENT HX OF CURRENT PROBLEM, REHAB EVAL
66 y/o M w/ PMHx CAD s/p stent 2008, DM, HTN, HLD, presenting to the ED for evaluation of nausea, vomiting, weakness for the past two days. Admitted for mild DKA.

## 2022-04-21 NOTE — CHART NOTE - NSCHARTNOTEFT_GEN_A_CORE
Transfer Note    Transfer from: MICU  Transfer to: Floors      HPI:  68 y/o M w/ PMHx CAD s/p stent 2008, DM, HTN, HLD, presenting to the ED for evaluation of nausea, vomiting, weakness for the past two days. Pt reports yesterday had few episodes of NBNB vomiting. Patient states today he was very weak and unable to tolerate po which prompted him to come to ED. Also reports watery diarrhea without hematochezia. Denies any sick contacts. Denies fever, chills, CP, SOB, coughing, dysuria. Reports compliance with meds including his diabetes meds but was not able to take them due to N/V.   ED course:   vitals: /109, , T 96.4F, O2 92% RA -> O2 96% on 2L NC  labs: WBC 13.92, K 3.4, lactate 2 -> 1.4, AG 21, BHB 1.6, glucose 306  imaging: CT a/p IV con: Colonic diverticula noted with no evidence of diverticulitis. No evidence of bowel obstruction, colitis, inflammatory process, or ascites. No pneumoperitoneum. CXR wet read: cardiomegaly w/ b/l opacities? given: NS 3L bolus total, KCL 50meq total, tylenol, zofran    Patient was started on subq insulin for now and admitted to SDU     SDU COURSE:  Patient remains weak and lethargic. Patient is not tolerating PO intake. 500cc bolus was given and sq insuline increased. Panculture sent to r/o infectious etiology. Patient is currently being treated for meningoencephalitis empirically. GOC was done at bedside with patient and his son. Patient wishes to be DNR/DNI. MOLST signed.   Patient is being upgraded to MICU for closer monitoring.   CT H ordered before performing LP, Coags sent.     MICU COURSE:  Upgraded from CEU for lethargy and increased work of breathing. CT head showed cerebral atrophy and nonspecific periventricular hypodensities. Placed on BIPAP with improvement. Decision made to forgo LP as sx were attributed to toxic metabolic encephalopathy 2/2 suspected CAP. Abx adjusted and currently patient is on Levaquin and Rocephin. Mental status greatly improved and patient AAOx4. Neurology consulted, recommended MRI noncontrast to further investigate CT head findings. Patient stable for downgrade to floor    ASSESSMENT & PLAN:   68 y/o M w/ PMHx CAD s/p stent 2008, DM, HTN, HLD, presenting to the ED for evaluation of nausea, vomiting, weakness for the past two days. Admitted for mild DKA.    #Sepsis present on admission 2/2 suspected CAP  #Mild hypoxia  - Patient w/ ~4 PPD smoking hx x40 years, quit 2009  - WBC 13.92, lactate 2.0 on admission  - Chest xray Linear opacity overlies the right midlung field  - CT abd/pelvis: no sign of inflammation or infection  - BNP 1609  - Procal 0.17  - RVP: (-)  - D-dimer: 343  - CK: 112  - BCx, UCx 4/19: NGTD  - ID consulted, recs appreciated  - Will need outpatient PFTs  - C/w incentive spirometry  - C/w HFNC, wean as tolerated  - C/w Levaquin 750 mg IV q24h  - C/w Rocephin 2g IV q12h    #AMS 2/2 ?toxic metabolic encephalopathy  - Patient initially drowsy and confused, now improved but still w unclear source  - Neurology consulted, recs appreciated  - MRI noncon  - EEG    #N/V/D likely secondary to mild compensated DKA -- resolved  #HAGMA w/ metabolic alkalosis  #SIRS + on admission likely secondary to DKA (leukocytosis, tachycardia)  - A1c: 7.3%  - K 3.4, AG 21, BHB 1.6, glucose 306  - S/p insulin gtt  - C/w Lantus 17U SC qhs, lispro 8U TIDAC, sliding scale PRN    #Hypertensive urgency -- improved  - Did not take PO meds AM of admission 2/2 nausea/vomiting  - Hold home HCTZ 2/2 contraction alkalosis  - C/w amlodipine 10 mg PO daily  - C/w lisinopril 40 mg PO daily (on benazapril at home)    #CAD s/p stent 2008  #HLD  - C/w Toprol 50 mg PO BID -- dosing/frequency confirmed w mail order pharmacy  - C/w Lipitor 40 mg PO qhs    #Anxiety/depression  - C/w Paxil 30 mg PO daily    #DVT ppx: Lovenox 40 mg SC daily  #GI ppx: Protonix 40 mg PO daily  #Diet: CC/DASH/TLC  #Activity: IAT; PT consult  #Dispo: From home, DGTF  #Code status: DNR/DNI    For Follow-Up:  [  ] MRI  [  ] EEG  [  ] PT consult

## 2022-04-21 NOTE — PROGRESS NOTE ADULT - ASSESSMENT
66 y/o M w/ PMHx cad s/p stent 2008, DM, HTN, HLD, presenting to the ED for evaluation of nausea, vomiting, weakness for the past two days. Admitted for mild DKA.    #Sepsis present on admission 2/2 suspected CAP  #Mild hypoxia  - Patient w/ ~4 PPD smoking hx x40 years, quit 2009  - WBC 13.92, lactate 2.0 on admission  - Chest xray Linear opacity overlies the right midlung field  - CT abd/pelvis: no sign of inflammation or infection  - BNP 1609  - Procal 0.17  - RVP: (-)  - D-dimer: 343  - CK: 112  - BCx, UCx 4/19: NGTD  - ID consulted, recs appreciated  - Will need outpatient PFTs  - C/w incentive spirometry  - C/w HFNC, wean as tolerated  - C/w Levaquin 750 mg IV q24h  - C/w Rocephin 2g IV q12h    #AMS 2/2 ?toxic metabolic encephalopathy  - Patient initially drowsy and confused, now improved but still w unclear source  - Neurology consulted, recs appreciated  - MRI noncon  - EEG    #N/V/D likely secondary to mild compensated DKA -- resolved  #HAGMA w/ metabolic alkalosis  #SIRS + on admission likely secondary to DKA (leukocytosis, tachycardia)  - A1c: 7.3%  - K 3.4, AG 21, BHB 1.6, glucose 306  - S/p insulin gtt  - C/w Lantus 17U SC qhs, lispro 8U TIDAC, sliding scale PRN    #Hypertensive urgency -- improved  - Did not take PO meds AM of admission 2/2 nausea/vomiting  - Hold home HCTZ 2/2 contraction alkalosis  - C/w amlodipine 10 mg PO daily  - C/w lisinopril 40 mg PO daily (on benazapril at home)    #CAD s/p stent 2008  #HLD  - C/w Toprol 50 mg PO BID -- dosing/frequency confirmed w mail order pharmacy  - C/w Lipitor 40 mg PO qhs    #Anxiety/depression  - C/w Paxil 30 mg PO daily    #DVT ppx: Lovenox 40 mg SC daily  #GI ppx: Protonix 40 mg PO daily  #Diet: CC/DASH/TLC  #Activity: IAT; PT consult  #Dispo: From home, DGTF  #Code status: DNR/DNI    Updated patient's son at bedside w plan

## 2022-04-21 NOTE — PROGRESS NOTE ADULT - SUBJECTIVE AND OBJECTIVE BOX
Patient is a 67y old  Male who presents with a chief complaint of Hyperglycemia (2022 13:49)        Over Night Events:  On HFNCO2 40/40.  Off pressors.  Tolerating PO         ROS:     All ROS are negative except HPI         PHYSICAL EXAM    ICU Vital Signs Last 24 Hrs  T(C): 37.3 (2022 08:00), Max: 38.2 (2022 14:00)  T(F): 99.2 (2022 08:00), Max: 100.8 (2022 14:00)  HR: 88 (2022 09:00) (84 - 118)  BP: 156/84 (2022 09:00) (109/68 - 173/86)  BP(mean): 116 (2022 09:00) (75 - 124)  ABP: --  ABP(mean): --  RR: 14 (2022 09:00) (12 - 26)  SpO2: 97% (2022 09:00) (94% - 98%)      CONSTITUTIONAL:  Well nourished.  In NAD    ENT:   Airway patent,   Mouth with normal mucosa.   No thrush    EYES:   Pupils equal,   Round and reactive to light.    CARDIAC:   Normal rate,   Regular rhythm.    No edema      Vascular:  Normal systolic impulse  No Carotid bruits    RESPIRATORY:   No wheezing  Bilateral BS  Normal chest expansion  Not tachypneic,  No use of accessory muscles    GASTROINTESTINAL:  Abdomen soft,   Non-tender,   No guarding,   + BS    MUSCULOSKELETAL:   Range of motion is not limited,  No clubbing, cyanosis    NEUROLOGICAL:   Alert and oriented X 4   No motor  deficits.    SKIN:   Skin normal color for race,    No evidence of rash.    PSYCHIATRIC:   No apparent risk to self or others.    HEMATOLOGICAL:  No cervical  lymphadenopathy.  no inguinal lymphadenopathy      22 @ 07:01  -  22 @ 07:00  --------------------------------------------------------  IN:    Insulin: 2 mL    Insulin: 3 mL    Insulin: 5 mL    Insulin: 24 mL    IV PiggyBack: 600 mL    sodium chloride 0.9%: 825 mL  Total IN: 1459 mL    OUT:    Voided (mL): 770 mL  Total OUT: 770 mL    Total NET: 689 mL          LABS:                            10.6   8.76  )-----------( 180      ( 2022 04:45 )             30.1                                                   134<L>  |  95<L>  |  16  ----------------------------<  165<H>  3.8   |  27  |  1.0    Ca    8.3<L>      2022 04:45  Phos  2.1       Mg     2.2         TPro  6.0  /  Alb  3.5  /  TBili  0.6  /  DBili  x   /  AST  32  /  ALT  30  /  AlkPhos  70        PT/INR - ( 2022 15:27 )   PT: 15.60 sec;   INR: 1.36 ratio         PTT - ( 2022 15:27 )  PTT:29.6 sec                                       Urinalysis Basic - ( 2022 16:30 )    Color: Yellow / Appearance: Clear / S.021 / pH: x  Gluc: x / Ketone: Small  / Bili: Negative / Urobili: 3 mg/dL   Blood: x / Protein: 100 mg/dL / Nitrite: Negative   Leuk Esterase: Negative / RBC: 5 /HPF / WBC 1 /HPF   Sq Epi: x / Non Sq Epi: 5 /HPF / Bacteria: Negative        CARDIAC MARKERS ( 2022 21:15 )  x     / <0.01 ng/mL / x     / x     / x      CARDIAC MARKERS ( 2022 19:06 )  x     / tnp ng/mL / x     / x     / x      CARDIAC MARKERS ( 2022 11:00 )  x     / x     / 112 U/L / x     / x                                                LIVER FUNCTIONS - ( 2022 04:45 )  Alb: 3.5 g/dL / Pro: 6.0 g/dL / ALK PHOS: 70 U/L / ALT: 30 U/L / AST: 32 U/L / GGT: x                                                  Culture - Blood (collected 2022 21:15)  Source: .Blood Blood  Preliminary Report (2022 02:01):    No growth to date.    Culture - Blood (collected 2022 04:30)  Source: .Blood Blood  Preliminary Report (2022 23:02):    No growth to date.                                                                                       ABG - ( 2022 02:04 )  pH, Arterial: 7.52  pH, Blood: x     /  pCO2: 35    /  pO2: 91    / HCO3: 29    / Base Excess: 5.7   /  SaO2: 99.1                MEDICATIONS  (STANDING):  amLODIPine   Tablet 10 milliGRAM(s) Oral daily  atorvastatin 40 milliGRAM(s) Oral at bedtime  cefTRIAXone   IVPB 2000 milliGRAM(s) IV Intermittent every 12 hours  chlorhexidine 4% Liquid 1 Application(s) Topical <User Schedule>  dextrose 5%. 1000 milliLiter(s) (50 mL/Hr) IV Continuous <Continuous>  dextrose 5%. 1000 milliLiter(s) (100 mL/Hr) IV Continuous <Continuous>  dextrose 50% Injectable 25 Gram(s) IV Push once  dextrose 50% Injectable 12.5 Gram(s) IV Push once  dextrose 50% Injectable 25 Gram(s) IV Push once  enoxaparin Injectable 40 milliGRAM(s) SubCutaneous every 24 hours  furosemide   Injectable 40 milliGRAM(s) IV Push daily  glucagon  Injectable 1 milliGRAM(s) IntraMuscular once  insulin glargine Injectable (LANTUS) 17 Unit(s) SubCutaneous at bedtime  insulin lispro (ADMELOG) corrective regimen sliding scale   SubCutaneous three times a day before meals  insulin lispro Injectable (ADMELOG) 8 Unit(s) SubCutaneous three times a day before meals  levoFLOXacin IVPB 750 milliGRAM(s) IV Intermittent every 24 hours  lisinopril 40 milliGRAM(s) Oral daily  metoprolol succinate ER 50 milliGRAM(s) Oral two times a day  pantoprazole    Tablet 40 milliGRAM(s) Oral before breakfast  PARoxetine 30 milliGRAM(s) Oral daily  sodium chloride 0.9%. 1000 milliLiter(s) (75 mL/Hr) IV Continuous <Continuous>    MEDICATIONS  (PRN):  acetaminophen     Tablet .. 650 milliGRAM(s) Oral every 6 hours PRN Temp greater or equal to 38C (100.4F), Mild Pain (1 - 3)  acetaminophen  Suppository .. 650 milliGRAM(s) Rectal every 6 hours PRN Temp greater or equal to 38C (100.4F), Mild Pain (1 - 3)  aluminum hydroxide/magnesium hydroxide/simethicone Suspension 30 milliLiter(s) Oral every 4 hours PRN Dyspepsia  dextrose Oral Gel 15 Gram(s) Oral once PRN Blood Glucose LESS THAN 70 milliGRAM(s)/deciliter  ondansetron Injectable 4 milliGRAM(s) IV Push every 8 hours PRN Nausea and/or Vomiting      New X-rays reviewed:                                                                                  ECHO    CXR interpreted by me:  Improving infiltrates

## 2022-04-21 NOTE — PHYSICAL THERAPY INITIAL EVALUATION ADULT - LEVEL OF INDEPENDENCE: SIT/STAND, REHAB EVAL
required 2 trials due to falling back to sitting at edge bed upon first trial/moderate assist (50% patients effort)

## 2022-04-21 NOTE — PROGRESS NOTE ADULT - ASSESSMENT
IMPRESSION:  Acute hypoxemic respiratory failure improving   Severe CAP   HAGMA with Metabolic alkalosis improved   HO DM with diabetic neuropathy  Mild DKA resolved   AMS improved     PLAN:    CNS: Avoid CNS depressants.  FU MRI NC.  Neuro eval appreciated.     HEENT: Oral care    PULMONARY:  HOB @ 45 degrees. Incentive spirometry.  Wean O2.      CARDIOVASCULAR:  Avoid overload.         GI: GI prophylaxis.  Feeding as tolerated .     RENAL:  Follow up lytes stat.  Correct as needed. U Chloride noted.      INFECTIOUS DISEASE: Follow up cultures.  Procal.  ID eval appreciated.  Continue Rocephin and Levaquin for now.         HEMATOLOGICAL:  DVT prophylaxis. Duplex negative    ENDOCRINE:  Follow up FS. Insulin drip.  Hold Metformin     MUSCULOSKELETAL: Bed rest.  DW family at bed side     Downgrade to floor

## 2022-04-21 NOTE — PHYSICAL THERAPY INITIAL EVALUATION ADULT - GAIT TRAINING, PT EVAL
Goal: pt will ambulate with rolling walker  25 feet with contact guard by discharge to facilitate return to PLOF.

## 2022-04-21 NOTE — PHYSICAL THERAPY INITIAL EVALUATION ADULT - LIVES WITH, PROFILE
in a house with 1 flight of steps to enter/alone in a basement apartment with 1 flight of steps down to enter/alone

## 2022-04-21 NOTE — PHYSICAL THERAPY INITIAL EVALUATION ADULT - GENERAL OBSERVATIONS, REHAB EVAL
pt. encountered in bed in NAD +5L O2 via nc, son present at bedside and left OOB in chair in NAD + 5L O2 via nc, son present. 1723 - [id number]

## 2022-04-21 NOTE — PROGRESS NOTE ADULT - SUBJECTIVE AND OBJECTIVE BOX
PATIENT:  ARTURO LINDER  MRN-[id number]    Patient is a 67y old  Male who presents with a chief complaint of hyperglycemia (2022 09:19)      INTERVAL HPI/OVERNIGHT EVENTS:  Patient seen and examined at bedside. No events overnight. No new complaints. Overall feels improved      ICU Vital Signs Last 24 Hrs  T(C): 37.3 (2022 08:00), Max: 38.2 (2022 14:00)  T(F): 99.2 (2022 08:00), Max: 100.8 (2022 14:00)  HR: 88 (2022 10:00) (84 - 118)  BP: 163/78 (2022 10:00) (109/68 - 173/86)  BP(mean): 105 (2022 10:00) (78 - 124)  RR: 16 (2022 10:00) (12 - 26)  SpO2: 97% (2022 10:00) (94% - 98%)    I&O's Summary    2022 07:01  -  2022 07:00  --------------------------------------------------------  IN: 1459 mL / OUT: 770 mL / NET: 689 mL        LABS:                        10.6   8.76  )-----------( 180      ( 2022 04:45 )             30.1     0421    134<L>  |  95<L>  |  16  ----------------------------<  165<H>  3.8   |  27  |  1.0    Ca    8.3<L>      2022 04:45  Phos  2.1       Mg     2.2         TPro  6.0  /  Alb  3.5  /  TBili  0.6  /  DBili  x   /  AST  32  /  ALT  30  /  AlkPhos  70  0421    PT/INR - ( 2022 15:27 )   PT: 15.60 sec;   INR: 1.36 ratio         PTT - ( 2022 15:27 )  PTT:29.6 sec  Urinalysis Basic - ( 2022 16:30 )    Color: Yellow / Appearance: Clear / S.021 / pH: x  Gluc: x / Ketone: Small  / Bili: Negative / Urobili: 3 mg/dL   Blood: x / Protein: 100 mg/dL / Nitrite: Negative   Leuk Esterase: Negative / RBC: 5 /HPF / WBC 1 /HPF   Sq Epi: x / Non Sq Epi: 5 /HPF / Bacteria: Negative      CAPILLARY BLOOD GLUCOSE      POCT Blood Glucose.: 183 mg/dL (2022 08:19)  POCT Blood Glucose.: 167 mg/dL (2022 04:09)  POCT Blood Glucose.: 184 mg/dL (2022 00:04)  POCT Blood Glucose.: 160 mg/dL (2022 20:07)  POCT Blood Glucose.: 137 mg/dL (2022 19:41)  POCT Blood Glucose.: 108 mg/dL (2022 18:20)  POCT Blood Glucose.: 74 mg/dL (2022 16:53)  POCT Blood Glucose.: 179 mg/dL (2022 14:33)  POCT Blood Glucose.: 202 mg/dL (2022 13:30)  POCT Blood Glucose.: 231 mg/dL (2022 12:17)  POCT Blood Glucose.: 241 mg/dL (2022 11:15)    ABG - ( 2022 02:04 )  pH, Arterial: 7.52  pH, Blood: x     /  pCO2: 35    /  pO2: 91    / HCO3: 29    / Base Excess: 5.7   /  SaO2: 99.1        RADIOLOGY & ADDITIONAL TESTS:    Consultant(s) Notes Reviewed:  [x ] YES  [ ] NO    MEDICATIONS  (STANDING):  amLODIPine   Tablet 10 milliGRAM(s) Oral daily  atorvastatin 40 milliGRAM(s) Oral at bedtime  cefTRIAXone   IVPB 2000 milliGRAM(s) IV Intermittent every 12 hours  chlorhexidine 4% Liquid 1 Application(s) Topical <User Schedule>  dextrose 5%. 1000 milliLiter(s) (50 mL/Hr) IV Continuous <Continuous>  dextrose 5%. 1000 milliLiter(s) (100 mL/Hr) IV Continuous <Continuous>  dextrose 50% Injectable 25 Gram(s) IV Push once  dextrose 50% Injectable 12.5 Gram(s) IV Push once  dextrose 50% Injectable 25 Gram(s) IV Push once  enoxaparin Injectable 40 milliGRAM(s) SubCutaneous every 24 hours  furosemide   Injectable 40 milliGRAM(s) IV Push daily  glucagon  Injectable 1 milliGRAM(s) IntraMuscular once  insulin glargine Injectable (LANTUS) 17 Unit(s) SubCutaneous at bedtime  insulin lispro (ADMELOG) corrective regimen sliding scale   SubCutaneous three times a day before meals  insulin lispro Injectable (ADMELOG) 8 Unit(s) SubCutaneous three times a day before meals  levoFLOXacin IVPB 750 milliGRAM(s) IV Intermittent every 24 hours  lisinopril 40 milliGRAM(s) Oral daily  metoprolol succinate ER 50 milliGRAM(s) Oral two times a day  pantoprazole    Tablet 40 milliGRAM(s) Oral before breakfast  PARoxetine 30 milliGRAM(s) Oral daily    MEDICATIONS  (PRN):  acetaminophen     Tablet .. 650 milliGRAM(s) Oral every 6 hours PRN Temp greater or equal to 38C (100.4F), Mild Pain (1 - 3)  acetaminophen  Suppository .. 650 milliGRAM(s) Rectal every 6 hours PRN Temp greater or equal to 38C (100.4F), Mild Pain (1 - 3)  aluminum hydroxide/magnesium hydroxide/simethicone Suspension 30 milliLiter(s) Oral every 4 hours PRN Dyspepsia  dextrose Oral Gel 15 Gram(s) Oral once PRN Blood Glucose LESS THAN 70 milliGRAM(s)/deciliter  ondansetron Injectable 4 milliGRAM(s) IV Push every 8 hours PRN Nausea and/or Vomiting      PHYSICAL EXAM:  GENERAL: Resting comfortably on HFNC  HEAD:  Atraumatic, normocephalic  EYES: EOMI, PERRLA, conjunctiva and sclera clear  NECK: Supple, no JVD  CHEST/LUNG: B/L good air entry; No rales, rhonchi, or wheezing  HEART: S1S2 normal, no S3, regular rate and rhythm, no murmurs  ABDOMEN: Soft, nontender, nondistended. Bowel sounds present  EXTREMITIES:  2+ peripheral pulses, no clubbing, cyanosis, or edema  SKIN: No rashes or lesions  NERVOUS SYSTEM: AAOx4    Care Discussed with Consultants/Other Providers [ x] YES  [ ] NO

## 2022-04-22 LAB
ALBUMIN SERPL ELPH-MCNC: 3.7 G/DL — SIGNIFICANT CHANGE UP (ref 3.5–5.2)
ALP SERPL-CCNC: 86 U/L — SIGNIFICANT CHANGE UP (ref 30–115)
ALT FLD-CCNC: 45 U/L — HIGH (ref 0–41)
ANION GAP SERPL CALC-SCNC: 13 MMOL/L — SIGNIFICANT CHANGE UP (ref 7–14)
AST SERPL-CCNC: 37 U/L — SIGNIFICANT CHANGE UP (ref 0–41)
BASOPHILS # BLD AUTO: 0.05 K/UL — SIGNIFICANT CHANGE UP (ref 0–0.2)
BASOPHILS NFR BLD AUTO: 0.6 % — SIGNIFICANT CHANGE UP (ref 0–1)
BILIRUB SERPL-MCNC: 0.5 MG/DL — SIGNIFICANT CHANGE UP (ref 0.2–1.2)
BUN SERPL-MCNC: 20 MG/DL — SIGNIFICANT CHANGE UP (ref 10–20)
CALCIUM SERPL-MCNC: 8.9 MG/DL — SIGNIFICANT CHANGE UP (ref 8.5–10.1)
CHLORIDE SERPL-SCNC: 94 MMOL/L — LOW (ref 98–110)
CO2 SERPL-SCNC: 28 MMOL/L — SIGNIFICANT CHANGE UP (ref 17–32)
CREAT SERPL-MCNC: 0.9 MG/DL — SIGNIFICANT CHANGE UP (ref 0.7–1.5)
EGFR: 94 ML/MIN/1.73M2 — SIGNIFICANT CHANGE UP
EOSINOPHIL # BLD AUTO: 0.17 K/UL — SIGNIFICANT CHANGE UP (ref 0–0.7)
EOSINOPHIL NFR BLD AUTO: 2 % — SIGNIFICANT CHANGE UP (ref 0–8)
GLUCOSE BLDC GLUCOMTR-MCNC: 191 MG/DL — HIGH (ref 70–99)
GLUCOSE BLDC GLUCOMTR-MCNC: 242 MG/DL — HIGH (ref 70–99)
GLUCOSE BLDC GLUCOMTR-MCNC: 274 MG/DL — HIGH (ref 70–99)
GLUCOSE BLDC GLUCOMTR-MCNC: 322 MG/DL — HIGH (ref 70–99)
GLUCOSE SERPL-MCNC: 184 MG/DL — HIGH (ref 70–99)
HCT VFR BLD CALC: 34 % — LOW (ref 42–52)
HGB BLD-MCNC: 11.9 G/DL — LOW (ref 14–18)
IMM GRANULOCYTES NFR BLD AUTO: 2.5 % — HIGH (ref 0.1–0.3)
LYMPHOCYTES # BLD AUTO: 1.47 K/UL — SIGNIFICANT CHANGE UP (ref 1.2–3.4)
LYMPHOCYTES # BLD AUTO: 17 % — LOW (ref 20.5–51.1)
MAGNESIUM SERPL-MCNC: 2.1 MG/DL — SIGNIFICANT CHANGE UP (ref 1.8–2.4)
MCHC RBC-ENTMCNC: 30.6 PG — SIGNIFICANT CHANGE UP (ref 27–31)
MCHC RBC-ENTMCNC: 35 G/DL — SIGNIFICANT CHANGE UP (ref 32–37)
MCV RBC AUTO: 87.4 FL — SIGNIFICANT CHANGE UP (ref 80–94)
MONOCYTES # BLD AUTO: 1.13 K/UL — HIGH (ref 0.1–0.6)
MONOCYTES NFR BLD AUTO: 13.1 % — HIGH (ref 1.7–9.3)
NEUTROPHILS # BLD AUTO: 5.61 K/UL — SIGNIFICANT CHANGE UP (ref 1.4–6.5)
NEUTROPHILS NFR BLD AUTO: 64.8 % — SIGNIFICANT CHANGE UP (ref 42.2–75.2)
NRBC # BLD: 0 /100 WBCS — SIGNIFICANT CHANGE UP (ref 0–0)
PLATELET # BLD AUTO: 184 K/UL — SIGNIFICANT CHANGE UP (ref 130–400)
POTASSIUM SERPL-MCNC: 4.1 MMOL/L — SIGNIFICANT CHANGE UP (ref 3.5–5)
POTASSIUM SERPL-SCNC: 4.1 MMOL/L — SIGNIFICANT CHANGE UP (ref 3.5–5)
PROCALCITONIN SERPL-MCNC: 0.28 NG/ML — HIGH (ref 0.02–0.1)
PROT SERPL-MCNC: 6.5 G/DL — SIGNIFICANT CHANGE UP (ref 6–8)
RBC # BLD: 3.89 M/UL — LOW (ref 4.7–6.1)
RBC # FLD: 12.5 % — SIGNIFICANT CHANGE UP (ref 11.5–14.5)
SODIUM SERPL-SCNC: 135 MMOL/L — SIGNIFICANT CHANGE UP (ref 135–146)
WBC # BLD: 8.65 K/UL — SIGNIFICANT CHANGE UP (ref 4.8–10.8)
WBC # FLD AUTO: 8.65 K/UL — SIGNIFICANT CHANGE UP (ref 4.8–10.8)

## 2022-04-22 PROCEDURE — 95819 EEG AWAKE AND ASLEEP: CPT | Mod: 26

## 2022-04-22 PROCEDURE — 99232 SBSQ HOSP IP/OBS MODERATE 35: CPT

## 2022-04-22 RX ADMIN — LISINOPRIL 40 MILLIGRAM(S): 2.5 TABLET ORAL at 06:19

## 2022-04-22 RX ADMIN — PANTOPRAZOLE SODIUM 40 MILLIGRAM(S): 20 TABLET, DELAYED RELEASE ORAL at 06:18

## 2022-04-22 RX ADMIN — Medication 8 UNIT(S): at 12:33

## 2022-04-22 RX ADMIN — Medication 3: at 17:19

## 2022-04-22 RX ADMIN — Medication 40 MILLIGRAM(S): at 06:19

## 2022-04-22 RX ADMIN — Medication 50 MILLIGRAM(S): at 06:19

## 2022-04-22 RX ADMIN — Medication 50 MILLIGRAM(S): at 17:18

## 2022-04-22 RX ADMIN — Medication 8 UNIT(S): at 08:02

## 2022-04-22 RX ADMIN — Medication 8 UNIT(S): at 17:19

## 2022-04-22 RX ADMIN — CHLORHEXIDINE GLUCONATE 1 APPLICATION(S): 213 SOLUTION TOPICAL at 07:05

## 2022-04-22 RX ADMIN — CEFTRIAXONE 100 MILLIGRAM(S): 500 INJECTION, POWDER, FOR SOLUTION INTRAMUSCULAR; INTRAVENOUS at 17:19

## 2022-04-22 RX ADMIN — CEFTRIAXONE 100 MILLIGRAM(S): 500 INJECTION, POWDER, FOR SOLUTION INTRAMUSCULAR; INTRAVENOUS at 06:19

## 2022-04-22 RX ADMIN — ATORVASTATIN CALCIUM 40 MILLIGRAM(S): 80 TABLET, FILM COATED ORAL at 22:34

## 2022-04-22 RX ADMIN — Medication 1: at 08:02

## 2022-04-22 RX ADMIN — Medication 2: at 12:33

## 2022-04-22 RX ADMIN — ENOXAPARIN SODIUM 40 MILLIGRAM(S): 100 INJECTION SUBCUTANEOUS at 06:19

## 2022-04-22 RX ADMIN — INSULIN GLARGINE 17 UNIT(S): 100 INJECTION, SOLUTION SUBCUTANEOUS at 22:34

## 2022-04-22 RX ADMIN — AMLODIPINE BESYLATE 10 MILLIGRAM(S): 2.5 TABLET ORAL at 06:18

## 2022-04-22 RX ADMIN — Medication 30 MILLIGRAM(S): at 17:18

## 2022-04-22 NOTE — PROGRESS NOTE ADULT - ASSESSMENT
ASSESSMENT  68yo M w/ pmhx cad s/p stent 2008, DM, HTN, HLD, presenting to the ED for evaluation of nausea, vomiting, weakness for the past two days. Pt reports yesterday had few episodes of NBNB vomitin. Patientt states today he was very weak and unable to tolerate po which prompted him to come to ED. Also reports watery diarrhea without hematochezia. Denies any sick contacts. Denies fever, chills, CP, SOB, coughing, dysuria. Reports compliance with meds including his diabetes meds but was not able to take them today due to N/V.     IMPRESSION  #Sepsis on admission - unclear source, possible CAP  - Xray Chest 1 View- PORTABLE-Routine (Xray Chest 1 View- PORTABLE-Routine in AM.) (04.21.22 @ 05:39): Patchy right-sided pulmonary opacities redemonstrated.  - Blood Cx 4/19 NG   - urine Legionella 4/19 negative  - RVP negative     #Acute Hypoxic Respiratory failure   #Hyperglycemia - mild DKA   #Nasuea/Vomiting  #Hypertensive Urgency     #Obesity BMI (kg/m2): 31.4  #Abx allergy: NKDA    RECOMMENDATIONS  - stop levofloxacin   - switch to vantin 200 mg BID to complete 7 day course for CAP     Please call or message on [employer] Teams if with any questions.  Spectra [phone]

## 2022-04-22 NOTE — PROGRESS NOTE ADULT - SUBJECTIVE AND OBJECTIVE BOX
ARTURO LINDER  67y, Male  Allergy: No Known Allergies      LOS  4d    CHIEF COMPLAINT: hyperglycemia (2022 12:46)      INTERVAL EVENTS/HPI  - No acute events overnight  - T(F): , Max: 100 (22 @ 00:11)  - no complaints, wants to go home   - WBC Count: 8.65 (22 @ 04:30)  WBC Count: 8.76 (22 @ 04:45)     - Creatinine, Serum: 0.9 (22 @ 04:30)  Creatinine, Serum: 1.0 (22 @ 04:45)       ROS  General: Denies rigors, nightsweats  HEENT: Denies headache, rhinorrhea, sore throat, eye pain  CV: Denies CP, palpitations  PULM: Denies wheezing, hemoptysis  GI: Denies hematemesis, hematochezia, melena  : Denies discharge, hematuria  MSK: Denies arthralgias, myalgias  SKIN: Denies rash, lesions  NEURO: Denies paresthesias, weakness  PSYCH: Denies depression, anxiety    VITALS:  T(F): 100, Max: 100 (22 @ 00:11)  HR: 82  BP: 130/69  RR: 18Vital Signs Last 24 Hrs  T(C): 37.8 (2022 00:11), Max: 37.8 (2022 00:11)  T(F): 100 (2022 00:11), Max: 100 (2022 00:11)  HR: 82 (2022 00:11) (82 - 98)  BP: 130/69 (2022 00:11) (130/69 - 177/74)  BP(mean): --  RR: 18 (2022 00:11) (18 - 18)  SpO2: 97% (2022 16:00) (97% - 97%)    PHYSICAL EXAM:  Gen: NAD, resting in bed  HEENT: Normocephalic, atraumatic  Neck: supple, no lymphadenopathy  CV: Regular rate & regular rhythm  Lungs: decreased BS at bases, no fremitus  Abdomen: Soft, BS present  Ext: Warm, well perfused  Neuro: non focal, awake  Skin: no rash, no erythema  Lines: no phlebitis    FH: Non-contributory  Social Hx: Non-contributory    TESTS & MEASUREMENTS:                        11.9   8.65  )-----------( 184      ( 2022 04:30 )             34.0         135  |  94<L>  |  20  ----------------------------<  184<H>  4.1   |  28  |  0.9    Ca    8.9      2022 04:30  Mg     2.1         TPro  6.5  /  Alb  3.7  /  TBili  0.5  /  DBili  x   /  AST  37  /  ALT  45<H>  /  AlkPhos  86        LIVER FUNCTIONS - ( 2022 04:30 )  Alb: 3.7 g/dL / Pro: 6.5 g/dL / ALK PHOS: 86 U/L / ALT: 45 U/L / AST: 37 U/L / GGT: x           Urinalysis Basic - ( 2022 13:50 )    Color: Yellow / Appearance: Clear / S.025 / pH: x  Gluc: x / Ketone: Negative  / Bili: Negative / Urobili: <2 mg/dL   Blood: x / Protein: 30 mg/dL / Nitrite: Negative   Leuk Esterase: Negative / RBC: 2 /HPF / WBC x   Sq Epi: x / Non Sq Epi: Occasional / Bacteria: x        Culture - Blood (collected 22 @ 21:15)  Source: .Blood Blood  Preliminary Report (22 @ 02:01):    No growth to date.    Culture - Urine (collected 22 @ 16:30)  Source: Clean Catch Clean Catch (Midstream)  Final Report (22 @ 10:08):    No growth    Culture - Blood (collected 22 @ 04:30)  Source: .Blood Blood  Preliminary Report (22 @ 23:02):    No growth to date.        Lactate, Blood: 1.6 mmol/L (22 @ 05:30)  Lactate, Blood: 1.7 mmol/L (22 @ 21:15)  Blood Gas Venous - Lactate: 1.40 mmol/L (22 @ 21:31)  Lactate, Blood: 2.0 mmol/L (22 @ 18:32)      INFECTIOUS DISEASES TESTING  Procalcitonin, Serum: 0.28 (22 @ 10:50)  Legionella Antigen, Urine: Negative (22 @ 14:54)  Procalcitonin, Serum: 0.17 (22 @ 04:30)  Rapid RVP Result: NotDetec (22 @ 03:52)  COVID-19 PCR: NotDetec (22 @ 22:25)      INFLAMMATORY MARKERS      RADIOLOGY & ADDITIONAL TESTS:  I have personally reviewed the last available Chest xray  CXR  Xray Chest 1 View- PORTABLE-Urgent:   ACC: [id number] EXAM:  XR CHEST PORTABLE URGENT 1V                          PROCEDURE DATE:  2022          INTERPRETATION:  Clinical History / Reason for exam: Dyspnea    Comparison : Chest radiograph 2022.    Technique/Positioning: Frontal.    Findings:    Support devices: None.    Cardiac/mediastinum/hilum: Unremarkable.    Lung parenchyma/Pleura: Within normal limits.    Skeleton/soft tissues: Unremarkable.    Impression:    No radiographic evidence of acute cardiopulmonary disease.        --- End of Report ---            NEETU SCHNEIDER MD; Attending Radiologist  This document has been electronically signed. 2022 10:02AM (22 @ 21:37)      CT      CARDIOLOGY TESTING  12 Lead ECG:   Ventricular Rate 119 BPM    Atrial Rate 119 BPM    P-R Interval 136 ms    QRS Duration 88 ms    Q-T Interval 320 ms    QTC Calculation(Bazett) 450 ms    P Axis 38 degrees    R Axis 35 degrees    T Axis 0 degrees    Diagnosis Line Sinus tachycardia  Otherwise normal ECG    Confirmed by HOUSTON ONEIL MD ([id number]) on 2022 8:52:22 AM (22 @ 03:20)  12 Lead ECG:   Ventricular Rate 128 BPM    Atrial Rate 128 BPM    P-R Interval 148 ms    QRS Duration 90 ms    Q-T Interval 306 ms    QTC Calculation(Bazett) 446 ms    P Axis 47 degrees    R Axis 102 degrees    T Axis -5 degrees    Diagnosis Line Sinus tachycardia  Rightward axis  ST & T wave abnormality, consider inferior ischemia  Abnormal ECG    Confirmed by Irene Palma MD ([id number]) on 2022 6:43:54 AM (22 @ 17:08)      MEDICATIONS  amLODIPine   Tablet 10 Oral daily  atorvastatin 40 Oral at bedtime  cefTRIAXone   IVPB 2000 IV Intermittent every 12 hours  chlorhexidine 4% Liquid 1 Topical <User Schedule>  dextrose 5%. 1000 IV Continuous <Continuous>  dextrose 5%. 1000 IV Continuous <Continuous>  dextrose 50% Injectable 25 IV Push once  dextrose 50% Injectable 12.5 IV Push once  dextrose 50% Injectable 25 IV Push once  enoxaparin Injectable 40 SubCutaneous every 24 hours  furosemide   Injectable 40 IV Push daily  glucagon  Injectable 1 IntraMuscular once  insulin glargine Injectable (LANTUS) 17 SubCutaneous at bedtime  insulin lispro (ADMELOG) corrective regimen sliding scale  SubCutaneous three times a day before meals  insulin lispro Injectable (ADMELOG) 8 SubCutaneous three times a day before meals  levoFLOXacin IVPB 750 IV Intermittent every 24 hours  lisinopril 40 Oral daily  metoprolol succinate ER 50 Oral two times a day  pantoprazole    Tablet 40 Oral before breakfast  PARoxetine 30 Oral daily      WEIGHT  Weight (kg): 105.2 (22 @ 17:00)  Creatinine, Serum: 0.9 mg/dL (22 @ 04:30)      ANTIBIOTICS:  cefTRIAXone   IVPB 2000 milliGRAM(s) IV Intermittent every 12 hours  levoFLOXacin IVPB 750 milliGRAM(s) IV Intermittent every 24 hours      All available historical records have been reviewed

## 2022-04-22 NOTE — PROGRESS NOTE ADULT - SUBJECTIVE AND OBJECTIVE BOX
ARTURO LINDER 67y Male  MRN#: [id number]       SUBJECTIVE  Patient is a 67y old Male who presents with a chief complaint of hyperglycemia (2022 10:36)  Currently admitted to medicine with the primary diagnosis of Nausea and vomiting      Today is hospital day 4d    INTERVAL HISTORY/OVERNIGHT EVENTS:  Patient downgraded from MICU overnight. Patient examined at bedside. Denies SOB, CP, abd discomfort, N/V/C/D, headache, blurry vision or other complaints. Discussed results of recent MRI head noncon. Says he wants to go home.    OBJECTIVE  PAST MEDICAL & SURGICAL HISTORY  CAD S/P percutaneous coronary angioplasty    HTN (hypertension)    Diabetes mellitus    Hyperlipidemia    Anxiety    No significant past surgical history        ALLERGIES:  No Known Allergies      MEDICATIONS:  STANDING MEDICATIONS  amLODIPine   Tablet 10 milliGRAM(s) Oral daily  atorvastatin 40 milliGRAM(s) Oral at bedtime  cefTRIAXone   IVPB 2000 milliGRAM(s) IV Intermittent every 12 hours  chlorhexidine 4% Liquid 1 Application(s) Topical <User Schedule>  dextrose 5%. 1000 milliLiter(s) IV Continuous <Continuous>  dextrose 5%. 1000 milliLiter(s) IV Continuous <Continuous>  dextrose 50% Injectable 25 Gram(s) IV Push once  dextrose 50% Injectable 12.5 Gram(s) IV Push once  dextrose 50% Injectable 25 Gram(s) IV Push once  enoxaparin Injectable 40 milliGRAM(s) SubCutaneous every 24 hours  furosemide   Injectable 40 milliGRAM(s) IV Push daily  glucagon  Injectable 1 milliGRAM(s) IntraMuscular once  insulin glargine Injectable (LANTUS) 17 Unit(s) SubCutaneous at bedtime  insulin lispro (ADMELOG) corrective regimen sliding scale   SubCutaneous three times a day before meals  insulin lispro Injectable (ADMELOG) 8 Unit(s) SubCutaneous three times a day before meals  levoFLOXacin IVPB 750 milliGRAM(s) IV Intermittent every 24 hours  lisinopril 40 milliGRAM(s) Oral daily  metoprolol succinate ER 50 milliGRAM(s) Oral two times a day  pantoprazole    Tablet 40 milliGRAM(s) Oral before breakfast  PARoxetine 30 milliGRAM(s) Oral daily    PRN MEDICATIONS  acetaminophen     Tablet .. 650 milliGRAM(s) Oral every 6 hours PRN  aluminum hydroxide/magnesium hydroxide/simethicone Suspension 30 milliLiter(s) Oral every 4 hours PRN  dextrose Oral Gel 15 Gram(s) Oral once PRN  ondansetron Injectable 4 milliGRAM(s) IV Push every 8 hours PRN      VITAL SIGNS  T(C): 37.8 (2022 00:11), Max: 37.8 (2022 00:11)  T(F): 100 (2022 00:11), Max: 100 (2022 00:11)  HR: 82 (2022 00:11) (82 - 100)  BP: 130/69 (2022 00:11) (130/69 - 179/91)  BP(mean): 122 (2022 14:00) (105 - 122)  RR: 18 (2022 00:11) (14 - 18)  SpO2: 97% (2022 16:00) (94% - 97%)    LABS:                        11.9   8.65  )-----------( 184      ( 2022 04:30 )             34.0     04-22    135  |  94<L>  |  20  ----------------------------<  184<H>  4.1   |  28  |  0.9    Ca    8.9      2022 04:30  Mg     2.1     04-    TPro  6.5  /  Alb  3.7  /  TBili  0.5  /  DBili  x   /  AST  37  /  ALT  45<H>  /  AlkPhos  86  04-22      Urinalysis Basic - ( 2022 13:50 )    Color: Yellow / Appearance: Clear / S.025 / pH: x  Gluc: x / Ketone: Negative  / Bili: Negative / Urobili: <2 mg/dL   Blood: x / Protein: 30 mg/dL / Nitrite: Negative   Leuk Esterase: Negative / RBC: 2 /HPF / WBC x   Sq Epi: x / Non Sq Epi: Occasional / Bacteria: x        Culture - Blood (collected 2022 21:15)  Source: .Blood Blood  Preliminary Report (2022 02:01):    No growth to date.    Culture - Urine (collected 2022 16:30)  Source: Clean Catch Clean Catch (Midstream)  Final Report (2022 10:08):    No growth        PHYSICAL EXAM:  CONSTITUTIONAL: uncomfortable appearing male in NAD  ENT: Airway patent  EYES: Clear bilaterally, pupils equal, round and reactive to light.  CARDIAC: Normal rate, regular rhythm  RESPIRATORY: Coarse breath sounds R>L; No wheezing; Normal chest expansion. Not tachypneic, No use of accessory muscles  GASTROINTESTINAL: Abdomen soft, non-tender, No guarding, Positive BS  MUSCULOSKELETAL: Range of motion is not limited, No clubbing, cyanosis  NEUROLOGICAL: Alert and oriented, No motor deficits.  SKIN: Skin normal color for race, No evidence of rash    RADIOLOGY:  < from: MR Head No Cont (22 @ 21:57) >  No acute intracranial pathology.    Moderate chronic microvascular ischemic changes.    Moderate ventriculomegaly.    < end of copied text >

## 2022-04-22 NOTE — PROGRESS NOTE ADULT - ASSESSMENT
HPI:  66yo M w/ pmhx cad s/p stent 2008, DM, HTN, HLD, presenting to the ED for evaluation of nausea, vomiting, weakness for the past two days. Pt reports yesterday had few episodes of NBNB vomitin. Patientt states today he was very weak and unable to tolerate po which prompted him to come to ED. Also reports watery diarrhea without hematochezia. Denies any sick contacts. Denies fever, chills, CP, SOB, coughing, dysuria. Reports compliance with meds including his diabetes meds but was not able to take them today due to N/V.     ED course:   vitals: /109, , T 96.4F, O2 92% RA -> O2 96% on 2L NC  labs: WBC 13.92, K 3.4, lactate 2 -> 1.4, AG 21, BHB 1.6, glucose 306  imaging:   - CT a/p IV con: Colonic diverticula noted with no evidence of diverticulitis. No evidence of bowel obstruction, colitis, inflammatory process, or ascites. No pneumoperitoneum.   - CXR wet read: cardiomegaly w/ b/l opacities?  given: NS 2L bolus total, KCL 50meq total, tylenol, zofran  progress: MAR spoke to ICU regarding possible insulin drip for DKA, ICU rejected but admitted to stepdown and said to monitor on subq insulin for now (18 Apr 2022 23:27)    #Acute nausea vomiting secondary to dka secondary to sepsis poa secondary to acute hypoxic respiratory failure secondary to suspected gram negative pneumonia   on ceftriaxone and levaquin   titrate oxygen as tolerated   improving      #cad s/p stent 2008    # DM  CAPILLARY BLOOD GLUCOSE      POCT Blood Glucose.: 242 mg/dL (22 Apr 2022 11:35)  POCT Blood Glucose.: 191 mg/dL (22 Apr 2022 07:29)  POCT Blood Glucose.: 178 mg/dL (21 Apr 2022 22:50)  POCT Blood Glucose.: 162 mg/dL (21 Apr 2022 16:35)  controlled    # HTN  BP: 130/69 (22 Apr 2022 00:11) (130/69 - 177/74)  controlled    #HLD    #Mild tricuspid regurgitation.    #Pulmonary hypertension    #Mild tricuspid regurgitation.    #Class 1 obesity BMI 31 patient needs to see dieitian outpatient for further evaluation     #Colonic diverticula secondary to diverticulosis high fiber diet     PROGRESS NOTE HANDOFF    Pending: dc planning     Family discussion: patient verbalized understanding and agreeable to plan of care , daughter and wife at bedside answered questions reviewed case and discussed plan for snf    Disposition: SNF

## 2022-04-22 NOTE — PROGRESS NOTE ADULT - SUBJECTIVE AND OBJECTIVE BOX
ART[facility]INDER  67y  [id number]      Patient is a 67y old  Male who presents with a chief complaint of hyperglycemia (22 Apr 2022 08:52)      INTERVAL HPI/OVERNIGHT EVENTS:        REVIEW OF SYSTEMS:  CONSTITUTIONAL: No fever, weight loss, or fatigue  EYES: No eye pain, visual disturbances, or discharge  ENMT:  No difficulty hearing, tinnitus, vertigo; No sinus or throat pain  NECK: No pain or stiffness  BREASTS: No pain, masses, or nipple discharge  RESPIRATORY: No cough, wheezing, chills or hemoptysis; No shortness of breath  CARDIOVASCULAR: No chest pain, palpitations, dizziness, or leg swelling  GASTROINTESTINAL: No abdominal or epigastric pain. No nausea, vomiting, or hematemesis; No diarrhea or constipation. No melena or hematochezia.  GENITOURINARY: No dysuria, frequency, hematuria, or incontinence  NEUROLOGICAL: No headaches, memory loss, loss of strength, numbness, or tremors  SKIN: No itching, burning, rashes, or lesions   LYMPH NODES: No enlarged glands  ENDOCRINE: No heat or cold intolerance; No hair loss  MUSCULOSKELETAL: No joint pain or swelling; No muscle, back, or extremity pain  PSYCHIATRIC: No depression, anxiety, mood swings, or difficulty sleeping  HEME/LYMPH: No easy bruising, or bleeding gums  ALLERY AND IMMUNOLOGIC: No hives or eczema  FAMILY HISTORY:  No pertinent family history in first degree relatives      T(C): 37.8 (04-22-22 @ 00:11), Max: 37.8 (04-22-22 @ 00:11)  HR: 82 (04-22-22 @ 00:11) (82 - 100)  BP: 130/69 (04-22-22 @ 00:11) (130/69 - 177/74)  RR: 18 (04-22-22 @ 00:11) (17 - 18)  SpO2: 97% (04-21-22 @ 16:00) (94% - 97%)  Wt(kg): --Vital Signs Last 24 Hrs  T(C): 37.8 (22 Apr 2022 00:11), Max: 37.8 (22 Apr 2022 00:11)  T(F): 100 (22 Apr 2022 00:11), Max: 100 (22 Apr 2022 00:11)  HR: 82 (22 Apr 2022 00:11) (82 - 100)  BP: 130/69 (22 Apr 2022 00:11) (130/69 - 177/74)  BP(mean): 122 (21 Apr 2022 14:00) (109 - 122)  RR: 18 (22 Apr 2022 00:11) (17 - 18)  SpO2: 97% (21 Apr 2022 16:00) (94% - 97%)    PHYSICAL EXAM:  GENERAL: NAD, well-groomed, well-developed  HEAD:  Atraumatic, Normocephalic  EYES: EOMI, PERRLA, conjunctiva and sclera clear  ENMT: No tonsillar erythema, exudates, or enlargement; Moist mucous membranes, Good dentition, No lesions  NECK: Supple, No JVD, Normal thyroid  NERVOUS SYSTEM:  Alert & Oriented X3, Good concentration; Motor Strength 5/5 B/L upper and lower extremities; DTRs 2+ intact and symmetric  PULM: Clear to auscultation bilaterally  CARDIAC: Regular rate and rhythm; No murmurs, rubs, or gallops  GI: Soft, Nontender, Nondistended; Bowel sounds present  EXTREMITIES:  2+ Peripheral Pulses, No clubbing, cyanosis, or edema  LYMPH: No lymphadenopathy noted  SKIN: No rashes or lesions    Consultant(s) Notes Reviewed:  [x ] YES  [ ] NO  Care Discussed with Consultants/Other Providers [ x] YES  [ ] NO    LABS:                            11.9   8.65  )-----------( 184      ( 22 Apr 2022 04:30 )             34.0   04-22    135  |  94<L>  |  20  ----------------------------<  184<H>  4.1   |  28  |  0.9    Ca    8.9      22 Apr 2022 04:30  Mg     2.1     04-22    TPro  6.5  /  Alb  3.7  /  TBili  0.5  /  DBili  x   /  AST  37  /  ALT  45<H>  /  AlkPhos  86  04-22            Culture - Blood (collected 19 Apr 2022 21:15)  Source: .Blood Blood  Preliminary Report (21 Apr 2022 02:01):    No growth to date.    Culture - Urine (collected 19 Apr 2022 16:30)  Source: Clean Catch Clean Catch (Midstream)  Final Report (21 Apr 2022 10:08):    No growth      acetaminophen     Tablet .. 650 milliGRAM(s) Oral every 6 hours PRN  aluminum hydroxide/magnesium hydroxide/simethicone Suspension 30 milliLiter(s) Oral every 4 hours PRN  amLODIPine   Tablet 10 milliGRAM(s) Oral daily  atorvastatin 40 milliGRAM(s) Oral at bedtime  cefTRIAXone   IVPB 2000 milliGRAM(s) IV Intermittent every 12 hours  chlorhexidine 4% Liquid 1 Application(s) Topical <User Schedule>  dextrose 5%. 1000 milliLiter(s) IV Continuous <Continuous>  dextrose 5%. 1000 milliLiter(s) IV Continuous <Continuous>  dextrose 50% Injectable 25 Gram(s) IV Push once  dextrose 50% Injectable 12.5 Gram(s) IV Push once  dextrose 50% Injectable 25 Gram(s) IV Push once  dextrose Oral Gel 15 Gram(s) Oral once PRN  enoxaparin Injectable 40 milliGRAM(s) SubCutaneous every 24 hours  furosemide   Injectable 40 milliGRAM(s) IV Push daily  glucagon  Injectable 1 milliGRAM(s) IntraMuscular once  insulin glargine Injectable (LANTUS) 17 Unit(s) SubCutaneous at bedtime  insulin lispro (ADMELOG) corrective regimen sliding scale   SubCutaneous three times a day before meals  insulin lispro Injectable (ADMELOG) 8 Unit(s) SubCutaneous three times a day before meals  levoFLOXacin IVPB 750 milliGRAM(s) IV Intermittent every 24 hours  lisinopril 40 milliGRAM(s) Oral daily  metoprolol succinate ER 50 milliGRAM(s) Oral two times a day  ondansetron Injectable 4 milliGRAM(s) IV Push every 8 hours PRN  pantoprazole    Tablet 40 milliGRAM(s) Oral before breakfast  PARoxetine 30 milliGRAM(s) Oral daily      HEALTH ISSUES - PROBLEM Dx:          Case Discussed with House Staff   45 minutes spent on total encounter; more than 50% of the visit was spent counseling and/or coordinating care by the attending physician.   Spectra [phone]

## 2022-04-22 NOTE — PROGRESS NOTE ADULT - ASSESSMENT
68 y/o M w/ PMHx CAD s/p stent 2008, DM, HTN, HLD, presenting to the ED for evaluation of nausea, vomiting, weakness for the past two days. Admitted for mild DKA. Upgraded from SDU to MICU for AMS. Tx for toxic metabolic encephalopathy 2/2 CAP. Downgraded to med/surg.    # Sepsis present on admission 2/2 suspected CAP  # Mild hypoxia  - Patient w/ ~4 PPD smoking hx x40 years, quit 2009  - WBC 13.92, lactate 2.0 on admission  - Chest xray Linear opacity overlies the right midlung field  - CT abd/pelvis: no sign of inflammation or infection  - BNP 1609  - Procal 0.17  - RVP: (-)  - D-dimer: 343  - CK: 112  - BCx, UCx 4/19: NGTD  - ID consulted, recs appreciated  - Will need outpatient PFTs  - C/w incentive spirometry  - C/w O2 NC; wean as tolerated  - C/w Levaquin 750 mg IV q24h  - C/w Rocephin 2g IV q12h    #AMS 2/2 ?toxic metabolic encephalopathy  - Patient initially drowsy and confused, now improved but still w unclear source  - Neurology consulted, recs appreciated  - MRI noncon  - EEG    #N/V/D likely secondary to mild compensated DKA -- resolved  #HAGMA w/ metabolic alkalosis  #SIRS + on admission likely secondary to DKA (leukocytosis, tachycardia)  - A1c: 7.3%  - K 3.4, AG 21, BHB 1.6, glucose 306  - S/p insulin gtt  - C/w Lantus 17U SC qhs, lispro 8U TIDAC, sliding scale PRN    #Hypertensive urgency -- improved  - Did not take PO meds AM of admission 2/2 nausea/vomiting  - Hold home HCTZ 2/2 contraction alkalosis  - C/w amlodipine 10 mg PO daily  - C/w lisinopril 40 mg PO daily (on benazapril at home)    #CAD s/p stent 2008  #HLD  - C/w Toprol 50 mg PO BID -- dosing/frequency confirmed w mail order pharmacy  - C/w Lipitor 40 mg PO qhs    #Anxiety/depression  - C/w Paxil 30 mg PO daily    #DVT ppx: Lovenox 40 mg SC daily  #GI ppx: Protonix 40 mg PO daily  #Diet: CC/DASH/TLC  #Activity: IAT; PT consult  #Dispo: From home, DGTF  #Code status: DNR/DNI   66 y/o M w/ PMHx CAD s/p stent 2008, DM, HTN, HLD, presenting to the ED for evaluation of nausea, vomiting, weakness for the past two days. Admitted for mild DKA. Upgraded from SDU to MICU for AMS. Tx for toxic metabolic encephalopathy 2/2 CAP. Downgraded to med/surg.    # Sepsis present on admission 2/2 suspected CAP - improved  # Mild hypoxia - improved  - Patient w/ 160 PYH (~4 PPD smoking hx x40 years), quit 2009  - WBC 13.92, lactate 2.0 on admission  - Chest xray Linear opacity overlies the right midlung field  - CT abd/pelvis: no sign of inflammation or infection  - BNP 1609  - Procal 0.17, repeat 0.28  - RVP: (-)  - D-dimer: 343  - CK: 112  - BCx, UCx 4/19: NGTD  - ID consulted, recs appreciated  - Will need outpatient PFTs  - C/w incentive spirometry  - C/w O2 NC; wean as tolerated  - C/w Levaquin 750 mg IV q24h  - C/w Rocephin 2g IV q12h  - f/u ID     #AMS 2/2 ?toxic metabolic encephalopathy  - Patient initially drowsy and confused, now improved but still w unclear source  - Neurology consulted, recs appreciated  - MRI noncon : no acute pathology  - EEG : generalized slowing without epileptiform activity    #N/V/D likely secondary to mild compensated DKA -- resolved  #HAGMA w/ metabolic alkalosis  #SIRS + on admission likely secondary to DKA (leukocytosis, tachycardia)  - A1c: 7.3%  - K 3.4, AG 21, BHB 1.6, glucose 306  - S/p insulin gtt  - C/w Lantus 17U SC qhs, lispro 8U TIDAC, sliding scale PRN    #Hypertensive urgency -- improved  - Did not take PO meds AM of admission 2/2 nausea/vomiting  - Hold home HCTZ 2/2 contraction alkalosis  - C/w amlodipine 10 mg PO daily  - C/w lisinopril 40 mg PO daily (on benazapril at home)    #CAD s/p stent 2008  #HLD  - C/w Toprol 50 mg PO BID -- dosing/frequency confirmed w mail order pharmacy  - C/w Lipitor 40 mg PO qhs    #Anxiety/depression  - C/w Paxil 30 mg PO daily    #DVT ppx: Lovenox 40 mg SC daily  #GI ppx: Protonix 40 mg PO daily  #Diet: CC/DASH/TLC  #Activity: IAT; PT consult  #Dispo: From home, DGTF  #Code status: DNR/DNI

## 2022-04-23 LAB
GLUCOSE BLDC GLUCOMTR-MCNC: 197 MG/DL — HIGH (ref 70–99)
GLUCOSE BLDC GLUCOMTR-MCNC: 251 MG/DL — HIGH (ref 70–99)
GLUCOSE BLDC GLUCOMTR-MCNC: 270 MG/DL — HIGH (ref 70–99)
GLUCOSE BLDC GLUCOMTR-MCNC: 330 MG/DL — HIGH (ref 70–99)

## 2022-04-23 PROCEDURE — 99233 SBSQ HOSP IP/OBS HIGH 50: CPT

## 2022-04-23 RX ORDER — CEFPODOXIME PROXETIL 100 MG
200 TABLET ORAL EVERY 12 HOURS
Refills: 0 | Status: DISCONTINUED | OUTPATIENT
Start: 2022-04-23 | End: 2022-04-25

## 2022-04-23 RX ADMIN — Medication 8 UNIT(S): at 07:58

## 2022-04-23 RX ADMIN — AMLODIPINE BESYLATE 10 MILLIGRAM(S): 2.5 TABLET ORAL at 06:15

## 2022-04-23 RX ADMIN — Medication 200 MILLIGRAM(S): at 17:50

## 2022-04-23 RX ADMIN — CEFTRIAXONE 100 MILLIGRAM(S): 500 INJECTION, POWDER, FOR SOLUTION INTRAMUSCULAR; INTRAVENOUS at 06:14

## 2022-04-23 RX ADMIN — Medication 1: at 07:59

## 2022-04-23 RX ADMIN — Medication 50 MILLIGRAM(S): at 17:50

## 2022-04-23 RX ADMIN — Medication 4: at 17:49

## 2022-04-23 RX ADMIN — Medication 8 UNIT(S): at 12:05

## 2022-04-23 RX ADMIN — CHLORHEXIDINE GLUCONATE 1 APPLICATION(S): 213 SOLUTION TOPICAL at 06:19

## 2022-04-23 RX ADMIN — Medication 30 MILLIGRAM(S): at 12:06

## 2022-04-23 RX ADMIN — Medication 3: at 12:06

## 2022-04-23 RX ADMIN — LISINOPRIL 40 MILLIGRAM(S): 2.5 TABLET ORAL at 06:15

## 2022-04-23 RX ADMIN — Medication 50 MILLIGRAM(S): at 06:15

## 2022-04-23 RX ADMIN — ENOXAPARIN SODIUM 40 MILLIGRAM(S): 100 INJECTION SUBCUTANEOUS at 06:14

## 2022-04-23 RX ADMIN — Medication 8 UNIT(S): at 17:49

## 2022-04-23 RX ADMIN — ATORVASTATIN CALCIUM 40 MILLIGRAM(S): 80 TABLET, FILM COATED ORAL at 21:50

## 2022-04-23 RX ADMIN — PANTOPRAZOLE SODIUM 40 MILLIGRAM(S): 20 TABLET, DELAYED RELEASE ORAL at 06:15

## 2022-04-23 RX ADMIN — Medication 40 MILLIGRAM(S): at 06:14

## 2022-04-23 RX ADMIN — INSULIN GLARGINE 17 UNIT(S): 100 INJECTION, SOLUTION SUBCUTANEOUS at 21:50

## 2022-04-23 NOTE — PROGRESS NOTE ADULT - ASSESSMENT
HPI:  66yo M w/ pmhx cad s/p stent 2008, DM, HTN, HLD, presenting to the ED for evaluation of nausea, vomiting, weakness for the past two days. Pt reports yesterday had few episodes of NBNB vomitin. Patientt states today he was very weak and unable to tolerate po which prompted him to come to ED. Also reports watery diarrhea without hematochezia. Denies any sick contacts. Denies fever, chills, CP, SOB, coughing, dysuria. Reports compliance with meds including his diabetes meds but was not able to take them today due to N/V.     ED course:   vitals: /109, , T 96.4F, O2 92% RA -> O2 96% on 2L NC  labs: WBC 13.92, K 3.4, lactate 2 -> 1.4, AG 21, BHB 1.6, glucose 306  imaging:   - CT a/p IV con: Colonic diverticula noted with no evidence of diverticulitis. No evidence of bowel obstruction, colitis, inflammatory process, or ascites. No pneumoperitoneum.   - CXR wet read: cardiomegaly w/ b/l opacities?  given: NS 2L bolus total, KCL 50meq total, tylenol, zofran  progress: MAR spoke to ICU regarding possible insulin drip for DKA, ICU rejected but admitted to stepdown and said to monitor on subq insulin for now (18 Apr 2022 23:27)    #Acute nausea vomiting secondary to dka secondary to sepsis poa secondary to acute hypoxic respiratory failure secondary to suspected gram negative pneumonia   off oxygen   vantin      #cad s/p stent 2008    # DM  CAPILLARY BLOOD GLUCOSE      POCT Blood Glucose.: 251 mg/dL (23 Apr 2022 11:30)  POCT Blood Glucose.: 197 mg/dL (23 Apr 2022 07:33)  POCT Blood Glucose.: 322 mg/dL (22 Apr 2022 21:27)  POCT Blood Glucose.: 274 mg/dL (22 Apr 2022 17:10)  controlled     # HTN  BP: 148/76 (23 Apr 2022 08:11) (103/59 - 158/75)  controlled     #HLD    #Mild tricuspid regurgitation.    #Pulmonary hypertension    #Mild tricuspid regurgitation.    #Class 1 obesity BMI 31 patient needs to see dieitian outpatient for further evaluation     #Colonic diverticula secondary to diverticulosis high fiber diet     PROGRESS NOTE HANDOFF    Pending: dc planning     Family discussion: patient verbalized understanding and agreeable to plan of care , son and wife at bedside aware of plan of care     Disposition: SNF

## 2022-04-23 NOTE — PROGRESS NOTE ADULT - SUBJECTIVE AND OBJECTIVE BOX
ARTURO LINDER  67y  [facility]      Patient is a 67y old  Male who presents with a chief complaint of hyperglycemia (22 Apr 2022 15:34)      INTERVAL HPI/OVERNIGHT EVENTS:    no events overnight     REVIEW OF SYSTEMS:  CONSTITUTIONAL: No fever, weight loss, or fatigue  EYES: No eye pain, visual disturbances, or discharge  ENMT:  No difficulty hearing, tinnitus, vertigo; No sinus or throat pain  NECK: No pain or stiffness  BREASTS: No pain, masses, or nipple discharge  RESPIRATORY: No cough, wheezing, chills or hemoptysis; No shortness of breath  CARDIOVASCULAR: No chest pain, palpitations, dizziness, or leg swelling  GASTROINTESTINAL: No abdominal or epigastric pain. No nausea, vomiting, or hematemesis; No diarrhea or constipation. No melena or hematochezia.  GENITOURINARY: No dysuria, frequency, hematuria, or incontinence  NEUROLOGICAL: No headaches, memory loss, loss of strength, numbness, or tremors  SKIN: No itching, burning, rashes, or lesions   LYMPH NODES: No enlarged glands  ENDOCRINE: No heat or cold intolerance; No hair loss  MUSCULOSKELETAL: No joint pain or swelling; No muscle, back, or extremity pain  PSYCHIATRIC: No depression, anxiety, mood swings, or difficulty sleeping  HEME/LYMPH: No easy bruising, or bleeding gums  ALLERY AND IMMUNOLOGIC: No hives or eczema  FAMILY HISTORY:  No pertinent family history in first degree relatives      T(C): 36.4 (04-23-22 @ 08:11), Max: 37.6 (04-23-22 @ 00:00)  HR: 83 (04-23-22 @ 08:11) (81 - 122)  BP: 148/76 (04-23-22 @ 08:11) (103/59 - 158/75)  RR: 18 (04-23-22 @ 08:11) (18 - 18)  SpO2: 93% (04-22-22 @ 15:00) (93% - 93%)  Wt(kg): --Vital Signs Last 24 Hrs  T(C): 36.4 (23 Apr 2022 08:11), Max: 37.6 (23 Apr 2022 00:00)  T(F): 97.5 (23 Apr 2022 08:11), Max: 99.7 (23 Apr 2022 00:00)  HR: 83 (23 Apr 2022 08:11) (81 - 122)  BP: 148/76 (23 Apr 2022 08:11) (103/59 - 158/75)  BP(mean): --  RR: 18 (23 Apr 2022 08:11) (18 - 18)  SpO2: 93% (22 Apr 2022 15:00) (93% - 93%)    PHYSICAL EXAM:  GENERAL: NAD, well-groomed, well-developed  HEAD:  Atraumatic, Normocephalic  EYES: EOMI, PERRLA, conjunctiva and sclera clear  ENMT: No tonsillar erythema, exudates, or enlargement; Moist mucous membranes, Good dentition, No lesions  NECK: Supple, No JVD, Normal thyroid  NERVOUS SYSTEM:  Alert & Oriented X3, Good concentration; Motor Strength 5/5 B/L upper and lower extremities; DTRs 2+ intact and symmetric  PULM: Clear to auscultation bilaterally  CARDIAC: Regular rate and rhythm; No murmurs, rubs, or gallops  GI: Soft, Nontender, Nondistended; Bowel sounds present  EXTREMITIES:  2+ Peripheral Pulses, No clubbing, cyanosis, or edema  LYMPH: No lymphadenopathy noted  SKIN: No rashes or lesions    Consultant(s) Notes Reviewed:  [x ] YES  [ ] NO  Care Discussed with Consultants/Other Providers [ x] YES  [ ] NO    LABS:                            11.9   8.65  )-----------( 184      ( 22 Apr 2022 04:30 )             34.0   04-22    135  |  94<L>  |  20  ----------------------------<  184<H>  4.1   |  28  |  0.9    Ca    8.9      22 Apr 2022 04:30  Mg     2.1     04-22    TPro  6.5  /  Alb  3.7  /  TBili  0.5  /  DBili  x   /  AST  37  /  ALT  45<H>  /  AlkPhos  86  04-22            acetaminophen     Tablet .. 650 milliGRAM(s) Oral every 6 hours PRN  aluminum hydroxide/magnesium hydroxide/simethicone Suspension 30 milliLiter(s) Oral every 4 hours PRN  amLODIPine   Tablet 10 milliGRAM(s) Oral daily  atorvastatin 40 milliGRAM(s) Oral at bedtime  cefpodoxime 200 milliGRAM(s) Oral every 12 hours  chlorhexidine 4% Liquid 1 Application(s) Topical <User Schedule>  dextrose 5%. 1000 milliLiter(s) IV Continuous <Continuous>  dextrose 5%. 1000 milliLiter(s) IV Continuous <Continuous>  dextrose 50% Injectable 25 Gram(s) IV Push once  dextrose 50% Injectable 12.5 Gram(s) IV Push once  dextrose 50% Injectable 25 Gram(s) IV Push once  dextrose Oral Gel 15 Gram(s) Oral once PRN  enoxaparin Injectable 40 milliGRAM(s) SubCutaneous every 24 hours  furosemide   Injectable 40 milliGRAM(s) IV Push daily  glucagon  Injectable 1 milliGRAM(s) IntraMuscular once  insulin glargine Injectable (LANTUS) 17 Unit(s) SubCutaneous at bedtime  insulin lispro (ADMELOG) corrective regimen sliding scale   SubCutaneous three times a day before meals  insulin lispro Injectable (ADMELOG) 8 Unit(s) SubCutaneous three times a day before meals  lisinopril 40 milliGRAM(s) Oral daily  metoprolol succinate ER 50 milliGRAM(s) Oral two times a day  ondansetron Injectable 4 milliGRAM(s) IV Push every 8 hours PRN  pantoprazole    Tablet 40 milliGRAM(s) Oral before breakfast  PARoxetine 30 milliGRAM(s) Oral daily      HEALTH ISSUES - PROBLEM Dx:          Case Discussed with House Staff   45 minutes spent on total encounter; more than 50% of the visit was spent counseling and/or coordinating care by the attending physician.   Spectra [phone]

## 2022-04-24 LAB
ALBUMIN SERPL ELPH-MCNC: 3.8 G/DL — SIGNIFICANT CHANGE UP (ref 3.5–5.2)
ALP SERPL-CCNC: 93 U/L — SIGNIFICANT CHANGE UP (ref 30–115)
ALT FLD-CCNC: 58 U/L — HIGH (ref 0–41)
ANION GAP SERPL CALC-SCNC: 12 MMOL/L — SIGNIFICANT CHANGE UP (ref 7–14)
AST SERPL-CCNC: 36 U/L — SIGNIFICANT CHANGE UP (ref 0–41)
BASOPHILS # BLD AUTO: 0.11 K/UL — SIGNIFICANT CHANGE UP (ref 0–0.2)
BASOPHILS NFR BLD AUTO: 1.1 % — HIGH (ref 0–1)
BILIRUB SERPL-MCNC: 0.4 MG/DL — SIGNIFICANT CHANGE UP (ref 0.2–1.2)
BUN SERPL-MCNC: 19 MG/DL — SIGNIFICANT CHANGE UP (ref 10–20)
CALCIUM SERPL-MCNC: 9 MG/DL — SIGNIFICANT CHANGE UP (ref 8.5–10.1)
CHLORIDE SERPL-SCNC: 99 MMOL/L — SIGNIFICANT CHANGE UP (ref 98–110)
CO2 SERPL-SCNC: 29 MMOL/L — SIGNIFICANT CHANGE UP (ref 17–32)
CREAT SERPL-MCNC: 1.2 MG/DL — SIGNIFICANT CHANGE UP (ref 0.7–1.5)
CULTURE RESULTS: SIGNIFICANT CHANGE UP
EGFR: 67 ML/MIN/1.73M2 — SIGNIFICANT CHANGE UP
EOSINOPHIL # BLD AUTO: 0.45 K/UL — SIGNIFICANT CHANGE UP (ref 0–0.7)
EOSINOPHIL NFR BLD AUTO: 4.7 % — SIGNIFICANT CHANGE UP (ref 0–8)
GLUCOSE BLDC GLUCOMTR-MCNC: 246 MG/DL — HIGH (ref 70–99)
GLUCOSE BLDC GLUCOMTR-MCNC: 249 MG/DL — HIGH (ref 70–99)
GLUCOSE BLDC GLUCOMTR-MCNC: 281 MG/DL — HIGH (ref 70–99)
GLUCOSE BLDC GLUCOMTR-MCNC: 423 MG/DL — HIGH (ref 70–99)
GLUCOSE SERPL-MCNC: 225 MG/DL — HIGH (ref 70–99)
HCT VFR BLD CALC: 35.4 % — LOW (ref 42–52)
HGB BLD-MCNC: 12 G/DL — LOW (ref 14–18)
IMM GRANULOCYTES NFR BLD AUTO: 4.6 % — HIGH (ref 0.1–0.3)
LYMPHOCYTES # BLD AUTO: 2.41 K/UL — SIGNIFICANT CHANGE UP (ref 1.2–3.4)
LYMPHOCYTES # BLD AUTO: 25.1 % — SIGNIFICANT CHANGE UP (ref 20.5–51.1)
MAGNESIUM SERPL-MCNC: 2.1 MG/DL — SIGNIFICANT CHANGE UP (ref 1.8–2.4)
MCHC RBC-ENTMCNC: 29.6 PG — SIGNIFICANT CHANGE UP (ref 27–31)
MCHC RBC-ENTMCNC: 33.9 G/DL — SIGNIFICANT CHANGE UP (ref 32–37)
MCV RBC AUTO: 87.2 FL — SIGNIFICANT CHANGE UP (ref 80–94)
MONOCYTES # BLD AUTO: 1.11 K/UL — HIGH (ref 0.1–0.6)
MONOCYTES NFR BLD AUTO: 11.6 % — HIGH (ref 1.7–9.3)
NEUTROPHILS # BLD AUTO: 5.07 K/UL — SIGNIFICANT CHANGE UP (ref 1.4–6.5)
NEUTROPHILS NFR BLD AUTO: 52.9 % — SIGNIFICANT CHANGE UP (ref 42.2–75.2)
NRBC # BLD: 0 /100 WBCS — SIGNIFICANT CHANGE UP (ref 0–0)
PLATELET # BLD AUTO: 321 K/UL — SIGNIFICANT CHANGE UP (ref 130–400)
POTASSIUM SERPL-MCNC: 3.5 MMOL/L — SIGNIFICANT CHANGE UP (ref 3.5–5)
POTASSIUM SERPL-SCNC: 3.5 MMOL/L — SIGNIFICANT CHANGE UP (ref 3.5–5)
PROT SERPL-MCNC: 6.7 G/DL — SIGNIFICANT CHANGE UP (ref 6–8)
RBC # BLD: 4.06 M/UL — LOW (ref 4.7–6.1)
RBC # FLD: 12.4 % — SIGNIFICANT CHANGE UP (ref 11.5–14.5)
SODIUM SERPL-SCNC: 140 MMOL/L — SIGNIFICANT CHANGE UP (ref 135–146)
SPECIMEN SOURCE: SIGNIFICANT CHANGE UP
WBC # BLD: 9.59 K/UL — SIGNIFICANT CHANGE UP (ref 4.8–10.8)
WBC # FLD AUTO: 9.59 K/UL — SIGNIFICANT CHANGE UP (ref 4.8–10.8)

## 2022-04-24 PROCEDURE — 99232 SBSQ HOSP IP/OBS MODERATE 35: CPT

## 2022-04-24 RX ADMIN — Medication 8 UNIT(S): at 12:09

## 2022-04-24 RX ADMIN — Medication 200 MILLIGRAM(S): at 05:12

## 2022-04-24 RX ADMIN — PANTOPRAZOLE SODIUM 40 MILLIGRAM(S): 20 TABLET, DELAYED RELEASE ORAL at 07:43

## 2022-04-24 RX ADMIN — Medication 50 MILLIGRAM(S): at 17:16

## 2022-04-24 RX ADMIN — LISINOPRIL 40 MILLIGRAM(S): 2.5 TABLET ORAL at 05:12

## 2022-04-24 RX ADMIN — Medication 40 MILLIGRAM(S): at 05:13

## 2022-04-24 RX ADMIN — Medication 50 MILLIGRAM(S): at 05:12

## 2022-04-24 RX ADMIN — ENOXAPARIN SODIUM 40 MILLIGRAM(S): 100 INJECTION SUBCUTANEOUS at 05:14

## 2022-04-24 RX ADMIN — Medication 8 UNIT(S): at 07:42

## 2022-04-24 RX ADMIN — Medication 2: at 07:42

## 2022-04-24 RX ADMIN — AMLODIPINE BESYLATE 10 MILLIGRAM(S): 2.5 TABLET ORAL at 05:13

## 2022-04-24 RX ADMIN — Medication 8 UNIT(S): at 17:14

## 2022-04-24 RX ADMIN — ATORVASTATIN CALCIUM 40 MILLIGRAM(S): 80 TABLET, FILM COATED ORAL at 21:16

## 2022-04-24 RX ADMIN — Medication 200 MILLIGRAM(S): at 17:15

## 2022-04-24 RX ADMIN — Medication 3: at 12:09

## 2022-04-24 RX ADMIN — Medication 30 MILLIGRAM(S): at 12:10

## 2022-04-24 RX ADMIN — INSULIN GLARGINE 17 UNIT(S): 100 INJECTION, SOLUTION SUBCUTANEOUS at 21:15

## 2022-04-24 RX ADMIN — CHLORHEXIDINE GLUCONATE 1 APPLICATION(S): 213 SOLUTION TOPICAL at 05:12

## 2022-04-24 RX ADMIN — Medication 6: at 17:14

## 2022-04-24 NOTE — PROGRESS NOTE ADULT - SUBJECTIVE AND OBJECTIVE BOX
ARTURO LINDER  67y  [facility]12 [facility]      Patient is a 67y old  Male who presents with a chief complaint of hyperglycemia (23 Apr 2022 14:45)      INTERVAL HPI/OVERNIGHT EVENTS:  no acute events overnight       REVIEW OF SYSTEMS:  CONSTITUTIONAL: No fever, weight loss, or fatigue  EYES: No eye pain, visual disturbances, or discharge  ENMT:  No difficulty hearing, tinnitus, vertigo; No sinus or throat pain  NECK: No pain or stiffness  BREASTS: No pain, masses, or nipple discharge  RESPIRATORY: No cough, wheezing, chills or hemoptysis; No shortness of breath  CARDIOVASCULAR: No chest pain, palpitations, dizziness, or leg swelling  GASTROINTESTINAL: No abdominal or epigastric pain. No nausea, vomiting, or hematemesis; No diarrhea or constipation. No melena or hematochezia.  GENITOURINARY: No dysuria, frequency, hematuria, or incontinence  NEUROLOGICAL: No headaches, memory loss, loss of strength, numbness, or tremors  SKIN: No itching, burning, rashes, or lesions   LYMPH NODES: No enlarged glands  ENDOCRINE: No heat or cold intolerance; No hair loss  MUSCULOSKELETAL: No joint pain or swelling; No muscle, back, or extremity pain  PSYCHIATRIC: No depression, anxiety, mood swings, or difficulty sleeping  HEME/LYMPH: No easy bruising, or bleeding gums  ALLERY AND IMMUNOLOGIC: No hives or eczema  FAMILY HISTORY:  No pertinent family history in first degree relatives      T(C): 35.9 (04-24-22 @ 08:24), Max: 36.5 (04-23-22 @ 15:13)  HR: 73 (04-24-22 @ 08:24) (73 - 94)  BP: 142/78 (04-24-22 @ 08:24) (141/77 - 157/79)  RR: 19 (04-24-22 @ 08:24) (18 - 19)  SpO2: 91% (04-23-22 @ 15:13) (91% - 91%)  Wt(kg): --Vital Signs Last 24 Hrs  T(C): 35.9 (24 Apr 2022 08:24), Max: 36.5 (23 Apr 2022 15:13)  T(F): 96.7 (24 Apr 2022 08:24), Max: 97.7 (23 Apr 2022 15:13)  HR: 73 (24 Apr 2022 08:24) (73 - 94)  BP: 142/78 (24 Apr 2022 08:24) (141/77 - 157/79)  BP(mean): --  RR: 19 (24 Apr 2022 08:24) (18 - 19)  SpO2: 91% (23 Apr 2022 15:13) (91% - 91%)    PHYSICAL EXAM:  GENERAL: NAD, well-groomed, well-developed  HEAD:  Atraumatic, Normocephalic  EYES: EOMI, PERRLA, conjunctiva and sclera clear  ENMT: No tonsillar erythema, exudates, or enlargement; Moist mucous membranes, Good dentition, No lesions  NECK: Supple, No JVD, Normal thyroid  NERVOUS SYSTEM:  Alert & Oriented X3, Good concentration; Motor Strength 5/5 B/L upper and lower extremities; DTRs 2+ intact and symmetric  PULM: Clear to auscultation bilaterally  CARDIAC: Regular rate and rhythm; No murmurs, rubs, or gallops  GI: Soft, Nontender, Nondistended; Bowel sounds present  EXTREMITIES:  2+ Peripheral Pulses, No clubbing, cyanosis, or edema  LYMPH: No lymphadenopathy noted  SKIN: No rashes or lesions    Consultant(s) Notes Reviewed:  [x ] YES  [ ] NO  Care Discussed with Consultants/Other Providers [ x] YES  [ ] NO    LABS:                            12.0   9.59  )-----------( 321      ( 24 Apr 2022 06:35 )             35.4   04-24    140  |  99  |  19  ----------------------------<  225<H>  3.5   |  29  |  1.2    Ca    9.0      24 Apr 2022 06:35  Mg     2.1     04-24    TPro  6.7  /  Alb  3.8  /  TBili  0.4  /  DBili  x   /  AST  36  /  ALT  58<H>  /  AlkPhos  93  04-24            acetaminophen     Tablet .. 650 milliGRAM(s) Oral every 6 hours PRN  aluminum hydroxide/magnesium hydroxide/simethicone Suspension 30 milliLiter(s) Oral every 4 hours PRN  amLODIPine   Tablet 10 milliGRAM(s) Oral daily  atorvastatin 40 milliGRAM(s) Oral at bedtime  cefpodoxime 200 milliGRAM(s) Oral every 12 hours  chlorhexidine 4% Liquid 1 Application(s) Topical <User Schedule>  dextrose 5%. 1000 milliLiter(s) IV Continuous <Continuous>  dextrose 5%. 1000 milliLiter(s) IV Continuous <Continuous>  dextrose 50% Injectable 25 Gram(s) IV Push once  dextrose 50% Injectable 12.5 Gram(s) IV Push once  dextrose 50% Injectable 25 Gram(s) IV Push once  dextrose Oral Gel 15 Gram(s) Oral once PRN  enoxaparin Injectable 40 milliGRAM(s) SubCutaneous every 24 hours  furosemide   Injectable 40 milliGRAM(s) IV Push daily  glucagon  Injectable 1 milliGRAM(s) IntraMuscular once  insulin glargine Injectable (LANTUS) 17 Unit(s) SubCutaneous at bedtime  insulin lispro (ADMELOG) corrective regimen sliding scale   SubCutaneous three times a day before meals  insulin lispro Injectable (ADMELOG) 8 Unit(s) SubCutaneous three times a day before meals  lisinopril 40 milliGRAM(s) Oral daily  metoprolol succinate ER 50 milliGRAM(s) Oral two times a day  ondansetron Injectable 4 milliGRAM(s) IV Push every 8 hours PRN  pantoprazole    Tablet 40 milliGRAM(s) Oral before breakfast  PARoxetine 30 milliGRAM(s) Oral daily      HEALTH ISSUES - PROBLEM Dx:          Case Discussed with House Staff     Spectra [phone]

## 2022-04-24 NOTE — PROGRESS NOTE ADULT - ASSESSMENT
HPI:  68yo M w/ pmhx cad s/p stent 2008, DM, HTN, HLD, presenting to the ED for evaluation of nausea, vomiting, weakness for the past two days. Pt reports yesterday had few episodes of NBNB vomitin. Patientt states today he was very weak and unable to tolerate po which prompted him to come to ED. Also reports watery diarrhea without hematochezia. Denies any sick contacts. Denies fever, chills, CP, SOB, coughing, dysuria. Reports compliance with meds including his diabetes meds but was not able to take them today due to N/V.     ED course:   vitals: /109, , T 96.4F, O2 92% RA -> O2 96% on 2L NC  labs: WBC 13.92, K 3.4, lactate 2 -> 1.4, AG 21, BHB 1.6, glucose 306  imaging:   - CT a/p IV con: Colonic diverticula noted with no evidence of diverticulitis. No evidence of bowel obstruction, colitis, inflammatory process, or ascites. No pneumoperitoneum.   - CXR wet read: cardiomegaly w/ b/l opacities?  given: NS 2L bolus total, KCL 50meq total, tylenol, zofran  progress: MAR spoke to ICU regarding possible insulin drip for DKA, ICU rejected but admitted to stepdown and said to monitor on subq insulin for now (18 Apr 2022 23:27)    #Acute nausea vomiting secondary to dka secondary to sepsis poa secondary to acute hypoxic respiratory failure secondary to suspected gram negative pneumonia   off oxygen   vantin      #cad s/p stent 2008    # DM  POCT Blood Glucose.: 246 mg/dL (24 Apr 2022 07:33)  POCT Blood Glucose.: 270 mg/dL (23 Apr 2022 21:41)  POCT Blood Glucose.: 330 mg/dL (23 Apr 2022 16:45)  POCT Blood Glucose.: 251 mg/dL (23 Apr 2022 11:30)  yesterday afternoon elevated , monitor for now with correction as needed    # HTN  BP: 142/78 (24 Apr 2022 08:24) (141/77 - 157/79)  controlled     #HLD    #Mild tricuspid regurgitation.    #Pulmonary hypertension    #Mild tricuspid regurgitation.    #Class 1 obesity BMI 31 patient needs to see dieitian outpatient for further evaluation     #Colonic diverticula secondary to diverticulosis high fiber diet     PROGRESS NOTE HANDOFF    Pending: dc planning , pending placement    Family discussion: patient verbalized understanding and agreeable to plan of care ,      Disposition: SNF

## 2022-04-25 VITALS
TEMPERATURE: 98 F | DIASTOLIC BLOOD PRESSURE: 69 MMHG | SYSTOLIC BLOOD PRESSURE: 127 MMHG | RESPIRATION RATE: 18 BRPM | HEART RATE: 85 BPM

## 2022-04-25 LAB
ALBUMIN SERPL ELPH-MCNC: 4 G/DL — SIGNIFICANT CHANGE UP (ref 3.5–5.2)
ALP SERPL-CCNC: 104 U/L — SIGNIFICANT CHANGE UP (ref 30–115)
ALT FLD-CCNC: 59 U/L — HIGH (ref 0–41)
ANION GAP SERPL CALC-SCNC: 12 MMOL/L — SIGNIFICANT CHANGE UP (ref 7–14)
AST SERPL-CCNC: 28 U/L — SIGNIFICANT CHANGE UP (ref 0–41)
BASOPHILS # BLD AUTO: 0.11 K/UL — SIGNIFICANT CHANGE UP (ref 0–0.2)
BASOPHILS NFR BLD AUTO: 1 % — SIGNIFICANT CHANGE UP (ref 0–1)
BILIRUB SERPL-MCNC: 0.6 MG/DL — SIGNIFICANT CHANGE UP (ref 0.2–1.2)
BUN SERPL-MCNC: 20 MG/DL — SIGNIFICANT CHANGE UP (ref 10–20)
CALCIUM SERPL-MCNC: 9.5 MG/DL — SIGNIFICANT CHANGE UP (ref 8.5–10.1)
CHLORIDE SERPL-SCNC: 96 MMOL/L — LOW (ref 98–110)
CO2 SERPL-SCNC: 31 MMOL/L — SIGNIFICANT CHANGE UP (ref 17–32)
CREAT SERPL-MCNC: 1.1 MG/DL — SIGNIFICANT CHANGE UP (ref 0.7–1.5)
CULTURE RESULTS: SIGNIFICANT CHANGE UP
EGFR: 74 ML/MIN/1.73M2 — SIGNIFICANT CHANGE UP
EOSINOPHIL # BLD AUTO: 0.38 K/UL — SIGNIFICANT CHANGE UP (ref 0–0.7)
EOSINOPHIL NFR BLD AUTO: 3.5 % — SIGNIFICANT CHANGE UP (ref 0–8)
GLUCOSE BLDC GLUCOMTR-MCNC: 225 MG/DL — HIGH (ref 70–99)
GLUCOSE BLDC GLUCOMTR-MCNC: 305 MG/DL — HIGH (ref 70–99)
GLUCOSE BLDC GLUCOMTR-MCNC: 342 MG/DL — HIGH (ref 70–99)
GLUCOSE SERPL-MCNC: 239 MG/DL — HIGH (ref 70–99)
HCT VFR BLD CALC: 36.1 % — LOW (ref 42–52)
HGB BLD-MCNC: 12.5 G/DL — LOW (ref 14–18)
IMM GRANULOCYTES NFR BLD AUTO: 3.6 % — HIGH (ref 0.1–0.3)
LYMPHOCYTES # BLD AUTO: 2.57 K/UL — SIGNIFICANT CHANGE UP (ref 1.2–3.4)
LYMPHOCYTES # BLD AUTO: 24 % — SIGNIFICANT CHANGE UP (ref 20.5–51.1)
MAGNESIUM SERPL-MCNC: 1.8 MG/DL — SIGNIFICANT CHANGE UP (ref 1.8–2.4)
MCHC RBC-ENTMCNC: 30.1 PG — SIGNIFICANT CHANGE UP (ref 27–31)
MCHC RBC-ENTMCNC: 34.6 G/DL — SIGNIFICANT CHANGE UP (ref 32–37)
MCV RBC AUTO: 87 FL — SIGNIFICANT CHANGE UP (ref 80–94)
MONOCYTES # BLD AUTO: 1.1 K/UL — HIGH (ref 0.1–0.6)
MONOCYTES NFR BLD AUTO: 10.3 % — HIGH (ref 1.7–9.3)
NEUTROPHILS # BLD AUTO: 6.18 K/UL — SIGNIFICANT CHANGE UP (ref 1.4–6.5)
NEUTROPHILS NFR BLD AUTO: 57.6 % — SIGNIFICANT CHANGE UP (ref 42.2–75.2)
NRBC # BLD: 0 /100 WBCS — SIGNIFICANT CHANGE UP (ref 0–0)
PLATELET # BLD AUTO: 373 K/UL — SIGNIFICANT CHANGE UP (ref 130–400)
POTASSIUM SERPL-MCNC: 3.5 MMOL/L — SIGNIFICANT CHANGE UP (ref 3.5–5)
POTASSIUM SERPL-SCNC: 3.5 MMOL/L — SIGNIFICANT CHANGE UP (ref 3.5–5)
PROT SERPL-MCNC: 7.3 G/DL — SIGNIFICANT CHANGE UP (ref 6–8)
RBC # BLD: 4.15 M/UL — LOW (ref 4.7–6.1)
RBC # FLD: 12.6 % — SIGNIFICANT CHANGE UP (ref 11.5–14.5)
SODIUM SERPL-SCNC: 139 MMOL/L — SIGNIFICANT CHANGE UP (ref 135–146)
SPECIMEN SOURCE: SIGNIFICANT CHANGE UP
WBC # BLD: 10.73 K/UL — SIGNIFICANT CHANGE UP (ref 4.8–10.8)
WBC # FLD AUTO: 10.73 K/UL — SIGNIFICANT CHANGE UP (ref 4.8–10.8)

## 2022-04-25 PROCEDURE — 99232 SBSQ HOSP IP/OBS MODERATE 35: CPT

## 2022-04-25 PROCEDURE — 99233 SBSQ HOSP IP/OBS HIGH 50: CPT

## 2022-04-25 RX ORDER — INSULIN GLARGINE 100 [IU]/ML
21 INJECTION, SOLUTION SUBCUTANEOUS AT BEDTIME
Refills: 0 | Status: DISCONTINUED | OUTPATIENT
Start: 2022-04-25 | End: 2022-04-25

## 2022-04-25 RX ORDER — AMLODIPINE BESYLATE 2.5 MG/1
1 TABLET ORAL
Qty: 0 | Refills: 0 | DISCHARGE

## 2022-04-25 RX ORDER — OMEPRAZOLE 10 MG/1
1 CAPSULE, DELAYED RELEASE ORAL
Qty: 0 | Refills: 0 | DISCHARGE

## 2022-04-25 RX ORDER — METOPROLOL TARTRATE 50 MG
1 TABLET ORAL
Qty: 0 | Refills: 0 | DISCHARGE

## 2022-04-25 RX ORDER — FUROSEMIDE 40 MG
40 TABLET ORAL DAILY
Refills: 0 | Status: DISCONTINUED | OUTPATIENT
Start: 2022-04-25 | End: 2022-04-25

## 2022-04-25 RX ORDER — CEFPODOXIME PROXETIL 100 MG
1 TABLET ORAL
Qty: 0 | Refills: 0 | DISCHARGE
Start: 2022-04-25

## 2022-04-25 RX ORDER — PIOGLITAZONE HYDROCHLORIDE 15 MG/1
1 TABLET ORAL
Qty: 0 | Refills: 0 | DISCHARGE

## 2022-04-25 RX ORDER — GLIMEPIRIDE 1 MG
1 TABLET ORAL
Qty: 0 | Refills: 0 | DISCHARGE

## 2022-04-25 RX ORDER — METFORMIN HYDROCHLORIDE 850 MG/1
2 TABLET ORAL
Qty: 0 | Refills: 0 | DISCHARGE

## 2022-04-25 RX ORDER — GABAPENTIN 400 MG/1
1 CAPSULE ORAL
Qty: 0 | Refills: 0 | DISCHARGE

## 2022-04-25 RX ORDER — TRAMADOL HYDROCHLORIDE 50 MG/1
25 TABLET ORAL ONCE
Refills: 0 | Status: DISCONTINUED | OUTPATIENT
Start: 2022-04-25 | End: 2022-04-25

## 2022-04-25 RX ORDER — BENAZEPRIL HYDROCHLORIDE 40 MG/1
1 TABLET ORAL
Qty: 0 | Refills: 0 | DISCHARGE

## 2022-04-25 RX ORDER — ATORVASTATIN CALCIUM 80 MG/1
1 TABLET, FILM COATED ORAL
Qty: 0 | Refills: 0 | DISCHARGE

## 2022-04-25 RX ORDER — AMLODIPINE BESYLATE 2.5 MG/1
1 TABLET ORAL
Qty: 0 | Refills: 0 | DISCHARGE
Start: 2022-04-25

## 2022-04-25 RX ORDER — MELOXICAM 15 MG/1
1 TABLET ORAL
Qty: 0 | Refills: 0 | DISCHARGE

## 2022-04-25 RX ORDER — FUROSEMIDE 40 MG
1 TABLET ORAL
Qty: 0 | Refills: 0 | DISCHARGE
Start: 2022-04-25

## 2022-04-25 RX ADMIN — Medication 8 UNIT(S): at 17:11

## 2022-04-25 RX ADMIN — CHLORHEXIDINE GLUCONATE 1 APPLICATION(S): 213 SOLUTION TOPICAL at 05:50

## 2022-04-25 RX ADMIN — Medication 30 MILLIGRAM(S): at 11:21

## 2022-04-25 RX ADMIN — LISINOPRIL 40 MILLIGRAM(S): 2.5 TABLET ORAL at 05:51

## 2022-04-25 RX ADMIN — PANTOPRAZOLE SODIUM 40 MILLIGRAM(S): 20 TABLET, DELAYED RELEASE ORAL at 08:20

## 2022-04-25 RX ADMIN — Medication 8 UNIT(S): at 11:22

## 2022-04-25 RX ADMIN — Medication 8 UNIT(S): at 08:19

## 2022-04-25 RX ADMIN — Medication 50 MILLIGRAM(S): at 17:10

## 2022-04-25 RX ADMIN — TRAMADOL HYDROCHLORIDE 25 MILLIGRAM(S): 50 TABLET ORAL at 05:48

## 2022-04-25 RX ADMIN — Medication 2: at 08:20

## 2022-04-25 RX ADMIN — Medication 4: at 11:22

## 2022-04-25 RX ADMIN — Medication 4: at 17:11

## 2022-04-25 RX ADMIN — Medication 50 MILLIGRAM(S): at 05:51

## 2022-04-25 RX ADMIN — Medication 200 MILLIGRAM(S): at 05:52

## 2022-04-25 RX ADMIN — ENOXAPARIN SODIUM 40 MILLIGRAM(S): 100 INJECTION SUBCUTANEOUS at 06:19

## 2022-04-25 RX ADMIN — Medication 40 MILLIGRAM(S): at 05:51

## 2022-04-25 RX ADMIN — Medication 200 MILLIGRAM(S): at 17:10

## 2022-04-25 RX ADMIN — AMLODIPINE BESYLATE 10 MILLIGRAM(S): 2.5 TABLET ORAL at 05:51

## 2022-04-25 NOTE — DISCHARGE NOTE PROVIDER - HOSPITAL COURSE
68 y/o M w/ PMHx CAD s/p stent 2008, DM, HTN, HLD, presenting to the ED for evaluation of nausea, vomiting, weakness for the past two days. Admitted for mild DKA. Upgraded from SDU to MICU for AMS. Tx for toxic metabolic encephalopathy 2/2 CAP. Metabolic encephalopathy and hypoxia now resolved. Patient downgraded to Med/surg floor.   Patient was evaluated by ID - Started on levaquin and rocephin for the CAP. Patient now on vantin - 7 days course to be completed tomorrow.   Patient has been advised to follow up with Pulmonary and PCP outpatient.

## 2022-04-25 NOTE — DISCHARGE NOTE PROVIDER - NSDCMRMEDTOKEN_GEN_ALL_CORE_FT
amLODIPine 5 mg oral tablet: 1 tab(s) orally once a day  atorvastatin 40 mg oral tablet: 1 tab(s) orally once a day  benazepril 40 mg oral tablet: 1 tab(s) orally once a day  gabapentin 300 mg oral capsule: 1 cap(s) orally 3 times a day  glimepiride 4 mg oral tablet: 1 tab(s) orally 2 times a day (before meals)  hydroCHLOROthiazide 25 mg oral tablet: 1 tab(s) orally once a day  meloxicam 15 mg oral tablet: 1 tab(s) orally once a day, As Needed  metFORMIN 500 mg oral tablet: 2 tab(s) orally 2 times a day  Metoprolol Succinate ER 50 mg oral tablet, extended release: 1 tab(s) orally 2 times a day  Paxil 30 mg oral tablet: 1 tab(s) orally once a day  pioglitazone 45 mg oral tablet: 1 tab(s) orally once a day   amLODIPine 5 mg oral tablet: 1 tab(s) orally once a day  atorvastatin 40 mg oral tablet: 1 tab(s) orally once a day  benazepril 40 mg oral tablet: 1 tab(s) orally once a day  gabapentin 300 mg oral capsule: 1 cap(s) orally 3 times a day  glimepiride 4 mg oral tablet: 1 tab(s) orally 2 times a day (before meals)  hydroCHLOROthiazide 25 mg oral tablet: 1 tab(s) orally once a day  meloxicam 15 mg oral tablet: 1 tab(s) orally once a day, As Needed  metFORMIN 500 mg oral tablet: 2 tab(s) orally 2 times a day  Metoprolol Succinate ER 50 mg oral tablet, extended release: 1 tab(s) orally 2 times a day  Paxil 30 mg oral tablet: 1 tab(s) orally once a day  pioglitazone 45 mg oral tablet: 1 tab(s) orally once a day  PriLOSEC 20 mg oral delayed release capsule: 1 cap(s) orally once a day   amLODIPine 10 mg oral tablet: 1 tab(s) orally once a day  atorvastatin 40 mg oral tablet: 1 tab(s) orally once a day  benazepril 40 mg oral tablet: 1 tab(s) orally once a day  cefpodoxime 200 mg oral tablet: 1 tab(s) orally every 12 hours until 04/26/22  furosemide 40 mg oral tablet: 1 tab(s) orally once a day  gabapentin 300 mg oral capsule: 1 cap(s) orally 3 times a day  glimepiride 4 mg oral tablet: 1 tab(s) orally 2 times a day (before meals)  meloxicam 15 mg oral tablet: 1 tab(s) orally once a day, As Needed  metFORMIN 500 mg oral tablet: 2 tab(s) orally 2 times a day  Metoprolol Succinate ER 50 mg oral tablet, extended release: 1 tab(s) orally 2 times a day  Paxil 30 mg oral tablet: 1 tab(s) orally once a day  pioglitazone 45 mg oral tablet: 1 tab(s) orally once a day  PriLOSEC 20 mg oral delayed release capsule: 1 cap(s) orally once a day

## 2022-04-25 NOTE — PROGRESS NOTE ADULT - REASON FOR ADMISSION
hyperglycemia

## 2022-04-25 NOTE — PROGRESS NOTE ADULT - ASSESSMENT
HPI:  66yo M w/ pmhx cad s/p stent 2008, DM, HTN, HLD, presenting to the ED for evaluation of nausea, vomiting, weakness for the past two days. Pt reports yesterday had few episodes of NBNB vomitin. Patientt states today he was very weak and unable to tolerate po which prompted him to come to ED. Also reports watery diarrhea without hematochezia. Denies any sick contacts. Denies fever, chills, CP, SOB, coughing, dysuria. Reports compliance with meds including his diabetes meds but was not able to take them today due to N/V.     ED course:   vitals: /109, , T 96.4F, O2 92% RA -> O2 96% on 2L NC  labs: WBC 13.92, K 3.4, lactate 2 -> 1.4, AG 21, BHB 1.6, glucose 306  imaging:   - CT a/p IV con: Colonic diverticula noted with no evidence of diverticulitis. No evidence of bowel obstruction, colitis, inflammatory process, or ascites. No pneumoperitoneum.   - CXR wet read: cardiomegaly w/ b/l opacities?  given: NS 2L bolus total, KCL 50meq total, tylenol, zofran  progress: MAR spoke to ICU regarding possible insulin drip for DKA, ICU rejected but admitted to stepdown and said to monitor on subq insulin for now (18 Apr 2022 23:27)    #Acute nausea vomiting secondary to dka secondary to sepsis poa secondary to acute hypoxic respiratory failure secondary to suspected gram negative pneumonia   off oxygen   vantin   appreciate pulmonary follow up       #cad s/p stent 2008    # DM  POCT Blood Glucose.: 225 mg/dL (25 Apr 2022 07:22)  POCT Blood Glucose.: 249 mg/dL (24 Apr 2022 21:07)  POCT Blood Glucose.: 423 mg/dL (24 Apr 2022 16:31)  POCT Blood Glucose.: 281 mg/dL (24 Apr 2022 11:11)  controlled , one elevated reading , monitor for now     # HTN  BP: 154/84 (25 Apr 2022 08:18) (124/68 - 154/84)  one elevated reading , monitor for now      #HLD    #Mild tricuspid regurgitation.    #Pulmonary hypertension    #Mild tricuspid regurgitation.    #Class 1 obesity BMI 31 patient needs to see dieitian outpatient for further evaluation     #Colonic diverticula secondary to diverticulosis high fiber diet     PROGRESS NOTE HANDOFF    Pending: dc planning , pending placement    Family discussion: patient verbalized understanding and agreeable to plan of care ,      Disposition: SNF

## 2022-04-25 NOTE — DISCHARGE NOTE PROVIDER - PROVIDER TOKENS
PROVIDER:[TOKEN:[[id number]],FOLLOWUP:[2 weeks]],PROVIDER:[TOKEN:[[id number]],FOLLOWUP:[2 weeks]]

## 2022-04-25 NOTE — PROGRESS NOTE ADULT - ASSESSMENT
68 y/o M w/ PMHx CAD s/p stent 2008, DM, HTN, HLD, presenting to the ED for evaluation of nausea, vomiting, weakness for the past two days. Admitted for mild DKA. Upgraded from SDU to MICU for AMS. Tx for toxic metabolic encephalopathy 2/2 CAP. Downgraded to med/surg.    # Sepsis present on admission 2/2 suspected CAP - Resolved  # Mild hypoxia - Resolved  - Patient w/ 160 PYH (~4 PPD smoking hx x40 years), quit 2009  - WBC 13.92, lactate 2.0 on admission  - Chest xray Linear opacity overlies the right midlung field  - CT abd/pelvis: no sign of inflammation or infection  - BNP 1609  - Procal 0.17, repeat 0.28  - RVP: (-)  - D-dimer: 343  - CK: 112  - BCx, UCx 4/19: NGTD  - ID consulted, recs appreciated  - Will need outpatient PFTs  - C/w incentive spirometry  - C/w O2 NC; wean as tolerated  - s/p course of Levaquin and Rocephin  - continue with Vantin to complete 7 days course (Day 6 today)    #AMS 2/2 ?toxic metabolic encephalopathy - Improved  - Patient initially drowsy and confused, now improved but still w unclear source  - Neurology consulted, recs appreciated  - MRI noncon : no acute pathology  - EEG : generalized slowing without epileptiform activity    # N/V/D likely secondary to mild compensated DKA -- resolved  # HAGMA w/ metabolic alkalosis  # SIRS + on admission likely secondary to DKA (leukocytosis, tachycardia)  - A1c: 7.3%  - K 3.4, AG 21, BHB 1.6, glucose 306  - Insulin regimen 17/8/8/8 , sliding scale  - Increase lantus to 21U  - monitor FS    # Hypertensive urgency -- Resolved  - C/w amlodipine 10 mg PO daily  - C/w lisinopril 40 mg PO daily (on benazapril at home)    #CAD s/p stent 2008  #HLD  - C/w Toprol 50 mg PO BID -- dosing/frequency confirmed w mail order pharmacy  - C/w Lipitor 40 mg PO qhs    # Anxiety/depression  - C/w Paxil 30 mg PO daily    #DVT ppx: Lovenox 40 mg SC daily  #GI ppx: Protonix 40 mg PO daily  #Diet: CC/DASH/TLC  #Activity: IAT; PT consult  #Dispo: STR  #Code status: DNR/DNI 68 y/o M w/ PMHx CAD s/p stent 2008, DM, HTN, HLD, presenting to the ED for evaluation of nausea, vomiting, weakness for the past two days. Admitted for mild DKA. Upgraded from SDU to MICU for AMS. Tx for toxic metabolic encephalopathy 2/2 CAP. Downgraded to med/surg.    # Sepsis present on admission 2/2 suspected CAP - Resolved  # Mild hypoxia - Resolved  - Patient w/ 160 PYH (~4 PPD smoking hx x40 years), quit 2009  - WBC 13.92, lactate 2.0 on admission  - Chest xray Linear opacity overlies the right midlung field  - CT abd/pelvis: no sign of inflammation or infection  - BNP 1609  - Procal 0.17, repeat 0.28  - RVP: (-)  - D-dimer: 343  - CK: 112  - BCx, UCx 4/19: NGTD  - ID consulted, recs appreciated  - Will need outpatient PFTs  - C/w incentive spirometry  - C/w O2 NC; wean as tolerated  - s/p course of Levaquin and Rocephin  - continue with Vantin to complete 7 days course (Day 6 today)    #AMS 2/2 ?toxic metabolic encephalopathy - Improved  - Patient initially drowsy and confused, now improved but still w unclear source  - Neurology consulted, recs appreciated  - MRI noncon : no acute pathology  - EEG : generalized slowing without epileptiform activity    # N/V/D likely secondary to mild compensated DKA -- resolved  # HAGMA w/ metabolic alkalosis  # SIRS + on admission likely secondary to DKA (leukocytosis, tachycardia)  - A1c: 7.3%  - K 3.4, AG 21, BHB 1.6, glucose 306  - Insulin regimen 17/8/8/8 , sliding scale  - Increase lantus to 21U  - monitor FS    # Hypertensive urgency -- Resolved  - C/w amlodipine 10 mg PO daily  - C/w lisinopril 40 mg PO daily (on benazapril at home)  - Lasix 40mg OD - switch to PO    #CAD s/p stent 2008  #HLD  - C/w Toprol 50 mg PO BID -- dosing/frequency confirmed w mail order pharmacy  - C/w Lipitor 40 mg PO qhs    # Anxiety/depression  - C/w Paxil 30 mg PO daily    #DVT ppx: Lovenox 40 mg SC daily  #GI ppx: Protonix 40 mg PO daily  #Diet: CC/DASH/TLC  #Activity: IAT; PT consult  #Dispo: STR  #Code status: DNR/DNI

## 2022-04-25 NOTE — PROGRESS NOTE ADULT - PROVIDER SPECIALTY LIST ADULT
Critical Care
Hospitalist
Internal Medicine
Internal Medicine
Critical Care
Infectious Disease
Critical Care
Hospitalist
Internal Medicine
Pulmonology
Critical Care

## 2022-04-25 NOTE — PROGRESS NOTE ADULT - ASSESSMENT
IMPRESSION:  Acute hypoxemic respiratory failure resolved   Severe CAP treated   Probable PRESTON   HAGMA with Metabolic alkalosis improved   HO DM with diabetic neuropathy  Mild DKA resolved   AMS resolved     PLAN:    CNS: Avoid CNS depressants.      HEENT: Oral care    PULMONARY:  HOB @ 45 degrees. Incentive spirometry.  OP Pulmonary follow up      CARDIOVASCULAR:  Avoid overload.         GI: GI prophylaxis.  Feeding as tolerated .     RENAL:  Follow up lytes stat.  Correct as needed.     INFECTIOUS DISEASE: Follow up cultures. Finish ABX course     HEMATOLOGICAL:  DVT prophylaxis. Duplex negative    ENDOCRINE:  Follow up FS.     MUSCULOSKELETAL:  OOB to chair.  PT OT     Recall PRN

## 2022-04-25 NOTE — DISCHARGE NOTE PROVIDER - NSDCHHATTENDCERT_GEN_ALL_CORE
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 They need a DOT Medical Examiner Letter signed by you. Please see attached. This is to clear me of any seizure activity. Needed as soon as possible. Thank you in advance.     Juan Goldman   My signature below certifies that the above stated patient is homebound and upon completion of the Face-To-Face encounter, has the need for intermittent skilled nursing, physical therapy and/or speech or occupational therapy services in their home for their current diagnosis as outlined in their initial plan of care. These services will continue to be monitored by myself or another physician.

## 2022-04-25 NOTE — DISCHARGE NOTE PROVIDER - CARE PROVIDER_API CALL
MARY VALLADARES  Internal Medicine  [address] [place] [address]  Phone: ([phone]  Fax: ()-  Follow Up Time: 2 weeks    Wesley Cali)  Critical Care Medicine; Pulmonary Disease; Sleep Medicine  [address] [place] [address]  Phone: [phone]  Fax: [phone]  Follow Up Time: 2 weeks

## 2022-04-25 NOTE — PROGRESS NOTE ADULT - SUBJECTIVE AND OBJECTIVE BOX
ARTURO LINDER  67y  [facility] [id number]      Patient is a 67y old  Male who presents with a chief complaint of hyperglycemia (25 Apr 2022 09:26)      INTERVAL HPI/OVERNIGHT EVENTS:  no acute  events overnight       REVIEW OF SYSTEMS:  CONSTITUTIONAL: No fever, weight loss, or fatigue  EYES: No eye pain, visual disturbances, or discharge  ENMT:  No difficulty hearing, tinnitus, vertigo; No sinus or throat pain  NECK: No pain or stiffness  BREASTS: No pain, masses, or nipple discharge  RESPIRATORY: No cough, wheezing, chills or hemoptysis; No shortness of breath  CARDIOVASCULAR: No chest pain, palpitations, dizziness, or leg swelling  GASTROINTESTINAL: No abdominal or epigastric pain. No nausea, vomiting, or hematemesis; No diarrhea or constipation. No melena or hematochezia.  GENITOURINARY: No dysuria, frequency, hematuria, or incontinence  NEUROLOGICAL: No headaches, memory loss, loss of strength, numbness, or tremors  SKIN: No itching, burning, rashes, or lesions   LYMPH NODES: No enlarged glands  ENDOCRINE: No heat or cold intolerance; No hair loss  MUSCULOSKELETAL: No joint pain or swelling; No muscle, back, or extremity pain  PSYCHIATRIC: No depression, anxiety, mood swings, or difficulty sleeping  HEME/LYMPH: No easy bruising, or bleeding gums  ALLERY AND IMMUNOLOGIC: No hives or eczema  FAMILY HISTORY:  No pertinent family history in first degree relatives      T(C): 36.6 (04-25-22 @ 08:18), Max: 36.6 (04-24-22 @ 16:10)  HR: 80 (04-25-22 @ 08:18) (73 - 89)  BP: 154/84 (04-25-22 @ 08:18) (124/68 - 154/84)  RR: 18 (04-25-22 @ 08:18) (18 - 18)  SpO2: --  Wt(kg): --Vital Signs Last 24 Hrs  T(C): 36.6 (25 Apr 2022 08:18), Max: 36.6 (24 Apr 2022 16:10)  T(F): 97.9 (25 Apr 2022 08:18), Max: 97.9 (24 Apr 2022 16:10)  HR: 80 (25 Apr 2022 08:18) (73 - 89)  BP: 154/84 (25 Apr 2022 08:18) (124/68 - 154/84)  BP(mean): --  RR: 18 (25 Apr 2022 08:18) (18 - 18)  SpO2: --    PHYSICAL EXAM:  GENERAL: NAD, well-groomed, well-developed  HEAD:  Atraumatic, Normocephalic  EYES: EOMI, PERRLA, conjunctiva and sclera clear  ENMT: No tonsillar erythema, exudates, or enlargement; Moist mucous membranes, Good dentition, No lesions  NECK: Supple, No JVD, Normal thyroid  NERVOUS SYSTEM:  Alert & Oriented X3, Good concentration; Motor Strength 5/5 B/L upper and lower extremities; DTRs 2+ intact and symmetric  PULM: Clear to auscultation bilaterally  CARDIAC: Regular rate and rhythm; No murmurs, rubs, or gallops  GI: Soft, Nontender, Nondistended; Bowel sounds present  EXTREMITIES:  2+ Peripheral Pulses, No clubbing, cyanosis, or edema  LYMPH: No lymphadenopathy noted  SKIN: No rashes or lesions    Consultant(s) Notes Reviewed:  [x ] YES  [ ] NO  Care Discussed with Consultants/Other Providers [ x] YES  [ ] NO    LABS:                            12.5   10.73 )-----------( 373      ( 25 Apr 2022 04:30 )             36.1   04-25    139  |  96<L>  |  20  ----------------------------<  239<H>  3.5   |  31  |  1.1    Ca    9.5      25 Apr 2022 04:30  Mg     1.8     04-25    TPro  7.3  /  Alb  4.0  /  TBili  0.6  /  DBili  x   /  AST  28  /  ALT  59<H>  /  AlkPhos  104  04-25            acetaminophen     Tablet .. 650 milliGRAM(s) Oral every 6 hours PRN  aluminum hydroxide/magnesium hydroxide/simethicone Suspension 30 milliLiter(s) Oral every 4 hours PRN  amLODIPine   Tablet 10 milliGRAM(s) Oral daily  atorvastatin 40 milliGRAM(s) Oral at bedtime  cefpodoxime 200 milliGRAM(s) Oral every 12 hours  chlorhexidine 4% Liquid 1 Application(s) Topical <User Schedule>  dextrose 5%. 1000 milliLiter(s) IV Continuous <Continuous>  dextrose 5%. 1000 milliLiter(s) IV Continuous <Continuous>  dextrose 50% Injectable 25 Gram(s) IV Push once  dextrose 50% Injectable 12.5 Gram(s) IV Push once  dextrose 50% Injectable 25 Gram(s) IV Push once  dextrose Oral Gel 15 Gram(s) Oral once PRN  enoxaparin Injectable 40 milliGRAM(s) SubCutaneous every 24 hours  furosemide    Tablet 40 milliGRAM(s) Oral daily  glucagon  Injectable 1 milliGRAM(s) IntraMuscular once  insulin glargine Injectable (LANTUS) 21 Unit(s) SubCutaneous at bedtime  insulin lispro (ADMELOG) corrective regimen sliding scale   SubCutaneous three times a day before meals  insulin lispro Injectable (ADMELOG) 8 Unit(s) SubCutaneous three times a day before meals  lisinopril 40 milliGRAM(s) Oral daily  metoprolol succinate ER 50 milliGRAM(s) Oral two times a day  pantoprazole    Tablet 40 milliGRAM(s) Oral before breakfast  PARoxetine 30 milliGRAM(s) Oral daily      HEALTH ISSUES - PROBLEM Dx:          Case Discussed with House Staff   Spectra [phone]

## 2022-04-25 NOTE — DISCHARGE NOTE PROVIDER - NSDCCPCAREPLAN_GEN_ALL_CORE_FT
PRINCIPAL DISCHARGE DIAGNOSIS  Diagnosis: Nausea and vomiting  Assessment and Plan of Treatment: You were admitted to the hospital because of nausea and vomiting. You were in mild DKA when you came to the hospital. You were treated with IV fluids and insulin in the hospital and the symptoms have resolved. It was most likely because of pneumonia. You were treated with antibiotics for the pneumonia.   Continue taking your medications as prescribed. Follow up with your Primary care physician.         SECONDARY DISCHARGE DIAGNOSES  Diagnosis: Community acquired pneumonia  Assessment and Plan of Treatment: Please take your medications as directed. Don’t skip doses. Follow up with your primary care physician within 3 days. Continue taking your antibiotics as directed until they are all gone—even if you start to feel better. This will prevent the pneumonia from  Coughing up mucus is normal. Don’t use medicines to suppress your cough unless your cough is dry, painful, or interferes with your sleep. Get plenty of rest until your fever, shortness of breath, and chest pain go away. Plan to get a flu shot every year. Ask your primary care doctor about pneumonia vaccines.  Seek immediate medical attention if you experience chest pain, trouble breathing, blue lips or fingernails, fever of 100.4°F  (38°C) or higher, yellow, green, bloody, or smelly sputum, more than normal mucus production, vomiting or diarrhea.      Diagnosis: Hypertension  Assessment and Plan of Treatment: Hypertension  Hypertension, commonly called high blood pressure, is when the force of blood pumping through your arteries is too strong. Hypertension forces your heart to work harder to pump blood. Your arteries may become narrow or stiff. Having untreated or uncontrolled hypertension for a long period of time can cause heart attack, stroke, kidney disease, and other problems. If started on a medication, take exactly as prescribed by your health care professional. Maintain a healthy lifestyle and follow up with your primary care physician.  SEEK IMMEDIATE MEDICAL CARE IF YOU HAVE ANY OF THE FOLLOWING SYMPTOMS: severe headache, confusion, chest pain, abdominal pain, vomiting, or shortness of breath.       PRINCIPAL DISCHARGE DIAGNOSIS  Diagnosis: Nausea and vomiting  Assessment and Plan of Treatment: You were admitted to the hospital because of nausea and vomiting. You were in mild DKA when you came to the hospital. You were treated with IV fluids and insulin in the hospital and the symptoms have resolved. It was most likely because of pneumonia. You were treated with antibiotics for the pneumonia.   Continue taking your medications as prescribed. Follow up with your Primary care physician.         SECONDARY DISCHARGE DIAGNOSES  Diagnosis: Community acquired pneumonia  Assessment and Plan of Treatment: Please take your medications as directed. Don’t skip doses. Follow up with your primary care physician within 3 days. Continue taking your antibiotics as directed until they are all gone—even if you start to feel better. This will prevent the pneumonia from  Coughing up mucus is normal. Don’t use medicines to suppress your cough unless your cough is dry, painful, or interferes with your sleep. Get plenty of rest until your fever, shortness of breath, and chest pain go away. Plan to get a flu shot every year. Ask your primary care doctor about pneumonia vaccines.  Seek immediate medical attention if you experience chest pain, trouble breathing, blue lips or fingernails, fever of 100.4°F  (38°C) or higher, yellow, green, bloody, or smelly sputum, more than normal mucus production, vomiting or diarrhea.      Diagnosis: Hypertension  Assessment and Plan of Treatment: Your antihypertensive medications were adjusted during this admission. Continue taking the medications as prescribed. Follow up with your primary care physician.   Hypertension  Hypertension, commonly called high blood pressure, is when the force of blood pumping through your arteries is too strong. Hypertension forces your heart to work harder to pump blood. Your arteries may become narrow or stiff. Having untreated or uncontrolled hypertension for a long period of time can cause heart attack, stroke, kidney disease, and other problems. If started on a medication, take exactly as prescribed by your health care professional. Maintain a healthy lifestyle and follow up with your primary care physician.  SEEK IMMEDIATE MEDICAL CARE IF YOU HAVE ANY OF THE FOLLOWING SYMPTOMS: severe headache, confusion, chest pain, abdominal pain, vomiting, or shortness of breath.

## 2022-04-25 NOTE — PROGRESS NOTE ADULT - SUBJECTIVE AND OBJECTIVE BOX
Patient is a 67y old  Male who presents with a chief complaint of hyperglycemia (25 Apr 2022 07:29)        Over Night Events:  Looks and feels better.  On RA.  Off pressors          ROS:     All ROS are negative except HPI         PHYSICAL EXAM    ICU Vital Signs Last 24 Hrs  T(C): 36.5 (25 Apr 2022 05:52), Max: 36.6 (24 Apr 2022 16:10)  T(F): 97.7 (25 Apr 2022 05:52), Max: 97.9 (24 Apr 2022 16:10)  HR: 73 (25 Apr 2022 05:52) (73 - 89)  BP: 141/80 (25 Apr 2022 05:52) (124/68 - 142/78)  BP(mean): --  ABP: --  ABP(mean): --  RR: 18 (25 Apr 2022 05:52) (18 - 19)  SpO2: --      CONSTITUTIONAL:  Well nourished.  In NAD    ENT:   Airway patent,   Mouth with normal mucosa.     EYES:   Pupils equal,   Round and reactive to light.    CARDIAC:   Normal rate,   Regular rhythm.    No edema      Vascular:  Normal systolic impulse  No Carotid bruits    RESPIRATORY:   No wheezing  Bilateral BS  Normal chest expansion  Not tachypneic,  No use of accessory muscles    GASTROINTESTINAL:  Abdomen soft,   Non-tender,   No guarding,   + BS    MUSCULOSKELETAL:   Range of motion is not limited,  No clubbing, cyanosis    NEUROLOGICAL:   Alert and oriented   No motor  deficits.    SKIN:   Skin normal color for race,   No evidence of rash.    PSYCHIATRIC:   Normal mood and affect.   No apparent risk to self or others.        LABS:                            12.0   9.59  )-----------( 321      ( 24 Apr 2022 06:35 )             35.4                                               04-24    140  |  99  |  19  ----------------------------<  225<H>  3.5   |  29  |  1.2    Ca    9.0      24 Apr 2022 06:35  Mg     2.1     04-24    TPro  6.7  /  Alb  3.8  /  TBili  0.4  /  DBili  x   /  AST  36  /  ALT  58<H>  /  AlkPhos  93  04-24                                                                                           LIVER FUNCTIONS - ( 24 Apr 2022 06:35 )  Alb: 3.8 g/dL / Pro: 6.7 g/dL / ALK PHOS: 93 U/L / ALT: 58 U/L / AST: 36 U/L / GGT: x                                                                                                                                       MEDICATIONS  (STANDING):  amLODIPine   Tablet 10 milliGRAM(s) Oral daily  atorvastatin 40 milliGRAM(s) Oral at bedtime  cefpodoxime 200 milliGRAM(s) Oral every 12 hours  chlorhexidine 4% Liquid 1 Application(s) Topical <User Schedule>  dextrose 5%. 1000 milliLiter(s) (100 mL/Hr) IV Continuous <Continuous>  dextrose 5%. 1000 milliLiter(s) (50 mL/Hr) IV Continuous <Continuous>  dextrose 50% Injectable 25 Gram(s) IV Push once  dextrose 50% Injectable 12.5 Gram(s) IV Push once  dextrose 50% Injectable 25 Gram(s) IV Push once  enoxaparin Injectable 40 milliGRAM(s) SubCutaneous every 24 hours  furosemide   Injectable 40 milliGRAM(s) IV Push daily  glucagon  Injectable 1 milliGRAM(s) IntraMuscular once  insulin glargine Injectable (LANTUS) 21 Unit(s) SubCutaneous at bedtime  insulin lispro (ADMELOG) corrective regimen sliding scale   SubCutaneous three times a day before meals  insulin lispro Injectable (ADMELOG) 8 Unit(s) SubCutaneous three times a day before meals  lisinopril 40 milliGRAM(s) Oral daily  metoprolol succinate ER 50 milliGRAM(s) Oral two times a day  pantoprazole    Tablet 40 milliGRAM(s) Oral before breakfast  PARoxetine 30 milliGRAM(s) Oral daily    MEDICATIONS  (PRN):  acetaminophen     Tablet .. 650 milliGRAM(s) Oral every 6 hours PRN Temp greater or equal to 38C (100.4F), Mild Pain (1 - 3)  aluminum hydroxide/magnesium hydroxide/simethicone Suspension 30 milliLiter(s) Oral every 4 hours PRN Dyspepsia  dextrose Oral Gel 15 Gram(s) Oral once PRN Blood Glucose LESS THAN 70 milliGRAM(s)/deciliter  ondansetron Injectable 4 milliGRAM(s) IV Push every 8 hours PRN Nausea and/or Vomiting      New X-rays reviewed:                                                                                  ECHO    CXR interpreted by me:  Improved right sided infitlrate

## 2022-04-25 NOTE — PROGRESS NOTE ADULT - SUBJECTIVE AND OBJECTIVE BOX
*** Incomplete note ***    SUBJECTIVE:   LENGTH OF HOSPITAL STAY: 7d    CHIEF COMPLAINT:  Patient is a 67y old  Male who presents with a chief complaint of hyperglycemia (24 Apr 2022 10:16)      Events over the past 24 hours:      REVIEW OF SYSTEMS  Negative Except as mentioned above.    ALLERGIES:  No Known Allergies      MEDICATIONS:  STANDING MEDICATIONS  amLODIPine   Tablet 10 milliGRAM(s) Oral daily  atorvastatin 40 milliGRAM(s) Oral at bedtime  cefpodoxime 200 milliGRAM(s) Oral every 12 hours  chlorhexidine 4% Liquid 1 Application(s) Topical <User Schedule>  dextrose 5%. 1000 milliLiter(s) IV Continuous <Continuous>  dextrose 5%. 1000 milliLiter(s) IV Continuous <Continuous>  dextrose 50% Injectable 25 Gram(s) IV Push once  dextrose 50% Injectable 12.5 Gram(s) IV Push once  dextrose 50% Injectable 25 Gram(s) IV Push once  enoxaparin Injectable 40 milliGRAM(s) SubCutaneous every 24 hours  furosemide   Injectable 40 milliGRAM(s) IV Push daily  glucagon  Injectable 1 milliGRAM(s) IntraMuscular once  insulin glargine Injectable (LANTUS) 17 Unit(s) SubCutaneous at bedtime  insulin lispro (ADMELOG) corrective regimen sliding scale   SubCutaneous three times a day before meals  insulin lispro Injectable (ADMELOG) 8 Unit(s) SubCutaneous three times a day before meals  lisinopril 40 milliGRAM(s) Oral daily  metoprolol succinate ER 50 milliGRAM(s) Oral two times a day  pantoprazole    Tablet 40 milliGRAM(s) Oral before breakfast  PARoxetine 30 milliGRAM(s) Oral daily    PRN MEDICATIONS  acetaminophen     Tablet .. 650 milliGRAM(s) Oral every 6 hours PRN  aluminum hydroxide/magnesium hydroxide/simethicone Suspension 30 milliLiter(s) Oral every 4 hours PRN  dextrose Oral Gel 15 Gram(s) Oral once PRN  ondansetron Injectable 4 milliGRAM(s) IV Push every 8 hours PRN        OBJECTIVE:  VITALS:   T(F): 97.7 (04-25 @ 05:52), Max: 97.9 (04-24 @ 16:10)  HR: 73 (04-25 @ 05:52) (73 - 89)  BP: 141/80 (04-25 @ 05:52) (124/68 - 142/78)  RR: 18 (04-25 @ 05:52) (18 - 19)      PHYSICAL EXAM:      LABS:                        12.0   9.59  )-----------( 321      ( 24 Apr 2022 06:35 )             35.4             04-24    140  |  99  |  19  ----------------------------<  225<H>  3.5   |  29  |  1.2    Ca    9.0      24 Apr 2022 06:35  Mg     2.1     04-24    TPro  6.7  /  Alb  3.8  /  TBili  0.4  /  DBili  x   /  AST  36  /  ALT  58<H>  /  AlkPhos  93  04-24    LIVER FUNCTIONS - ( 24 Apr 2022 06:35 )  Alb: 3.8 g/dL / Pro: 6.7 g/dL / ALK PHOS: 93 U/L / ALT: 58 U/L / AST: 36 U/L / GGT: x                             Blood Glucose:  225 (04-25-22 @ 07:22)  249 (04-24-22 @ 21:07)  423 (04-24-22 @ 16:31)  281 (04-24-22 @ 11:11)  246 (04-24-22 @ 07:33)  270 (04-23-22 @ 21:41)  330 (04-23-22 @ 16:45)  251 (04-23-22 @ 11:30)  197 (04-23-22 @ 07:33)  322 (04-22-22 @ 21:27)  274 (04-22-22 @ 17:10)  242 (04-22-22 @ 11:35)        RADIOLOGY & ADDITIONAL TESTS:  Xray Chest 1 View- PORTABLE-Routine:   ACC: [id number] EXAM:  XR CHEST PORTABLE ROUTINE 1V                          PROCEDURE DATE:  04/21/2022          INTERPRETATION:  Clinical History / Reason for exam: Dyspnea    Comparison : Chest radiograph 4/20/2022.    Technique/Positioning: Frontal chest radiograph.    Findings:    Support devices: None.    Cardiac/mediastinum/hilum: Unchanged    Lung parenchyma/Pleura: Patchy right-sided pulmonary opacities   redemonstrated. No pneumothorax.    Skeleton/soft tissues: Unchanged    Impression:    Patchy right-sided pulmonary opacities redemonstrated.        --- End of Report ---            ELKIN OMALLEY MD; Attending Radiologist  This document has been electronically signed. Apr 21 2022 10:53AM (04-21-22 @ 05:39)                   SUBJECTIVE:   LENGTH OF HOSPITAL STAY: 7d    CHIEF COMPLAINT:  Patient is a 67y old  Male who presents with a chief complaint of hyperglycemia (24 Apr 2022 10:16)      Events over the past 24 hours:  Patient was seen at the bedside this morning. He is lying comfortably on the bed. There were no acute events overnight.  Patient denies any chest pain, shortness of breath, cough, nausea, vomiting, dizziness.    REVIEW OF SYSTEMS  Negative Except as mentioned above.    ALLERGIES:  No Known Allergies      MEDICATIONS:  STANDING MEDICATIONS  amLODIPine   Tablet 10 milliGRAM(s) Oral daily  atorvastatin 40 milliGRAM(s) Oral at bedtime  cefpodoxime 200 milliGRAM(s) Oral every 12 hours  chlorhexidine 4% Liquid 1 Application(s) Topical <User Schedule>  dextrose 5%. 1000 milliLiter(s) IV Continuous <Continuous>  dextrose 5%. 1000 milliLiter(s) IV Continuous <Continuous>  dextrose 50% Injectable 25 Gram(s) IV Push once  dextrose 50% Injectable 12.5 Gram(s) IV Push once  dextrose 50% Injectable 25 Gram(s) IV Push once  enoxaparin Injectable 40 milliGRAM(s) SubCutaneous every 24 hours  furosemide   Injectable 40 milliGRAM(s) IV Push daily  glucagon  Injectable 1 milliGRAM(s) IntraMuscular once  insulin glargine Injectable (LANTUS) 17 Unit(s) SubCutaneous at bedtime  insulin lispro (ADMELOG) corrective regimen sliding scale   SubCutaneous three times a day before meals  insulin lispro Injectable (ADMELOG) 8 Unit(s) SubCutaneous three times a day before meals  lisinopril 40 milliGRAM(s) Oral daily  metoprolol succinate ER 50 milliGRAM(s) Oral two times a day  pantoprazole    Tablet 40 milliGRAM(s) Oral before breakfast  PARoxetine 30 milliGRAM(s) Oral daily    PRN MEDICATIONS  acetaminophen     Tablet .. 650 milliGRAM(s) Oral every 6 hours PRN  aluminum hydroxide/magnesium hydroxide/simethicone Suspension 30 milliLiter(s) Oral every 4 hours PRN  dextrose Oral Gel 15 Gram(s) Oral once PRN  ondansetron Injectable 4 milliGRAM(s) IV Push every 8 hours PRN        OBJECTIVE:  VITALS:   T(F): 97.7 (04-25 @ 05:52), Max: 97.9 (04-24 @ 16:10)  HR: 73 (04-25 @ 05:52) (73 - 89)  BP: 141/80 (04-25 @ 05:52) (124/68 - 142/78)  RR: 18 (04-25 @ 05:52) (18 - 19)      PHYSICAL EXAM:  General: No acute distress; Pallor (-), Icterus (-), Cyanosis (-), Clubbing (-)  HEENT: Normocephalic, atraumatic, PERRLA, EOMI  PULM: Bilaterally equal and clear breath sounds, wheeze (-), rubs (-), crackles (-)  CVS: Normal S1 and S2, murmurs (-), rubs (-), gallops (-)   GI: Soft, nondistended, nontender, BS +  MSK: Edema (-), no muscle, bone or joint tenderness noted  SKIN: Warm and well perfused,  NEURO:  Alert and Oriented x 3; No gross focal neurological deficit noted      LABS:                        12.0   9.59  )-----------( 321      ( 24 Apr 2022 06:35 )             35.4             04-24    140  |  99  |  19  ----------------------------<  225<H>  3.5   |  29  |  1.2    Ca    9.0      24 Apr 2022 06:35  Mg     2.1     04-24    TPro  6.7  /  Alb  3.8  /  TBili  0.4  /  DBili  x   /  AST  36  /  ALT  58<H>  /  AlkPhos  93  04-24    LIVER FUNCTIONS - ( 24 Apr 2022 06:35 )  Alb: 3.8 g/dL / Pro: 6.7 g/dL / ALK PHOS: 93 U/L / ALT: 58 U/L / AST: 36 U/L / GGT: x                             Blood Glucose:  225 (04-25-22 @ 07:22)  249 (04-24-22 @ 21:07)  423 (04-24-22 @ 16:31)  281 (04-24-22 @ 11:11)  246 (04-24-22 @ 07:33)  270 (04-23-22 @ 21:41)  330 (04-23-22 @ 16:45)  251 (04-23-22 @ 11:30)  197 (04-23-22 @ 07:33)  322 (04-22-22 @ 21:27)  274 (04-22-22 @ 17:10)  242 (04-22-22 @ 11:35)        RADIOLOGY & ADDITIONAL TESTS:  Xray Chest 1 View- PORTABLE-Routine:   ACC: [id number] EXAM:  XR CHEST PORTABLE ROUTINE 1V                          PROCEDURE DATE:  04/21/2022          INTERPRETATION:  Clinical History / Reason for exam: Dyspnea    Comparison : Chest radiograph 4/20/2022.    Technique/Positioning: Frontal chest radiograph.    Findings:    Support devices: None.    Cardiac/mediastinum/hilum: Unchanged    Lung parenchyma/Pleura: Patchy right-sided pulmonary opacities   redemonstrated. No pneumothorax.    Skeleton/soft tissues: Unchanged    Impression:    Patchy right-sided pulmonary opacities redemonstrated.        --- End of Report ---            ELKIN OMALLEY MD; Attending Radiologist  This document has been electronically signed. Apr 21 2022 10:53AM (04-21-22 @ 05:39)

## 2022-04-29 DIAGNOSIS — E87.6 HYPOKALEMIA: ICD-10-CM

## 2022-04-29 DIAGNOSIS — J15.6 PNEUMONIA DUE TO OTHER GRAM-NEGATIVE BACTERIA: ICD-10-CM

## 2022-04-29 DIAGNOSIS — Z87.891 PERSONAL HISTORY OF NICOTINE DEPENDENCE: ICD-10-CM

## 2022-04-29 DIAGNOSIS — A41.9 SEPSIS, UNSPECIFIED ORGANISM: ICD-10-CM

## 2022-04-29 DIAGNOSIS — I36.1 NONRHEUMATIC TRICUSPID (VALVE) INSUFFICIENCY: ICD-10-CM

## 2022-04-29 DIAGNOSIS — K57.90 DIVERTICULOSIS OF INTESTINE, PART UNSPECIFIED, WITHOUT PERFORATION OR ABSCESS WITHOUT BLEEDING: ICD-10-CM

## 2022-04-29 DIAGNOSIS — E11.10 TYPE 2 DIABETES MELLITUS WITH KETOACIDOSIS WITHOUT COMA: ICD-10-CM

## 2022-04-29 DIAGNOSIS — Z79.84 LONG TERM (CURRENT) USE OF ORAL HYPOGLYCEMIC DRUGS: ICD-10-CM

## 2022-04-29 DIAGNOSIS — I16.0 HYPERTENSIVE URGENCY: ICD-10-CM

## 2022-04-29 DIAGNOSIS — I25.10 ATHEROSCLEROTIC HEART DISEASE OF NATIVE CORONARY ARTERY WITHOUT ANGINA PECTORIS: ICD-10-CM

## 2022-04-29 DIAGNOSIS — E11.40 TYPE 2 DIABETES MELLITUS WITH DIABETIC NEUROPATHY, UNSPECIFIED: ICD-10-CM

## 2022-04-29 DIAGNOSIS — E78.5 HYPERLIPIDEMIA, UNSPECIFIED: ICD-10-CM

## 2022-04-29 DIAGNOSIS — I10 ESSENTIAL (PRIMARY) HYPERTENSION: ICD-10-CM

## 2022-04-29 DIAGNOSIS — I27.20 PULMONARY HYPERTENSION, UNSPECIFIED: ICD-10-CM

## 2022-04-29 DIAGNOSIS — E66.9 OBESITY, UNSPECIFIED: ICD-10-CM

## 2022-04-29 DIAGNOSIS — J96.01 ACUTE RESPIRATORY FAILURE WITH HYPOXIA: ICD-10-CM

## 2022-04-29 DIAGNOSIS — G93.41 METABOLIC ENCEPHALOPATHY: ICD-10-CM

## 2023-01-26 ENCOUNTER — INPATIENT (INPATIENT)
Facility: HOSPITAL | Age: 68
LOS: 5 days | Discharge: NURSING, REHAB OR LONG-TERM CARE | End: 2023-02-01
Attending: INTERNAL MEDICINE | Admitting: INTERNAL MEDICINE
Payer: MEDICARE

## 2023-01-26 VITALS
HEART RATE: 120 BPM | DIASTOLIC BLOOD PRESSURE: 88 MMHG | WEIGHT: 231.49 LBS | OXYGEN SATURATION: 96 % | TEMPERATURE: 100 F | SYSTOLIC BLOOD PRESSURE: 129 MMHG | RESPIRATION RATE: 18 BRPM
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
## 2023-01-26 PROBLEM — I25.10 ATHEROSCLEROTIC HEART DISEASE OF NATIVE CORONARY ARTERY WITHOUT ANGINA PECTORIS: Chronic | Status: ACTIVE | Noted: 2022-04-19

## 2023-01-26 PROBLEM — E11.9 TYPE 2 DIABETES MELLITUS WITHOUT COMPLICATIONS: Chronic | Status: ACTIVE | Noted: 2022-04-19

## 2023-01-26 PROBLEM — E78.5 HYPERLIPIDEMIA, UNSPECIFIED: Chronic | Status: ACTIVE | Noted: 2022-04-19

## 2023-01-26 PROBLEM — I10 ESSENTIAL (PRIMARY) HYPERTENSION: Chronic | Status: ACTIVE | Noted: 2022-04-19

## 2023-01-26 PROBLEM — F41.9 ANXIETY DISORDER, UNSPECIFIED: Chronic | Status: ACTIVE | Noted: 2022-04-19

## 2023-01-26 LAB
ALBUMIN SERPL ELPH-MCNC: 5 G/DL — SIGNIFICANT CHANGE UP (ref 3.5–5.2)
ALP SERPL-CCNC: 74 U/L — SIGNIFICANT CHANGE UP (ref 30–115)
ALT FLD-CCNC: 16 U/L — SIGNIFICANT CHANGE UP (ref 0–41)
ANION GAP SERPL CALC-SCNC: 16 MMOL/L — HIGH (ref 7–14)
APPEARANCE UR: CLEAR — SIGNIFICANT CHANGE UP
APTT BLD: 35 SEC — SIGNIFICANT CHANGE UP (ref 27–39.2)
AST SERPL-CCNC: 19 U/L — SIGNIFICANT CHANGE UP (ref 0–41)
B-OH-BUTYR SERPL-SCNC: 0.8 MMOL/L — HIGH
BACTERIA # UR AUTO: ABNORMAL
BASE EXCESS BLDV CALC-SCNC: 2.1 MMOL/L — SIGNIFICANT CHANGE UP (ref -2–3)
BASOPHILS # BLD AUTO: 0.01 K/UL — SIGNIFICANT CHANGE UP (ref 0–0.2)
BASOPHILS NFR BLD AUTO: 0.2 % — SIGNIFICANT CHANGE UP (ref 0–1)
BILIRUB SERPL-MCNC: 1 MG/DL — SIGNIFICANT CHANGE UP (ref 0.2–1.2)
BILIRUB UR-MCNC: NEGATIVE — SIGNIFICANT CHANGE UP
BUN SERPL-MCNC: 14 MG/DL — SIGNIFICANT CHANGE UP (ref 10–20)
CA-I SERPL-SCNC: 1.13 MMOL/L — LOW (ref 1.15–1.33)
CALCIUM SERPL-MCNC: 9.9 MG/DL — SIGNIFICANT CHANGE UP (ref 8.4–10.5)
CHLORIDE SERPL-SCNC: 96 MMOL/L — LOW (ref 98–110)
CO2 SERPL-SCNC: 25 MMOL/L — SIGNIFICANT CHANGE UP (ref 17–32)
COLOR SPEC: YELLOW — SIGNIFICANT CHANGE UP
CREAT SERPL-MCNC: 1.2 MG/DL — SIGNIFICANT CHANGE UP (ref 0.7–1.5)
DIFF PNL FLD: ABNORMAL
EGFR: 66 ML/MIN/1.73M2 — SIGNIFICANT CHANGE UP
EOSINOPHIL # BLD AUTO: 0 K/UL — SIGNIFICANT CHANGE UP (ref 0–0.7)
EOSINOPHIL NFR BLD AUTO: 0 % — SIGNIFICANT CHANGE UP (ref 0–8)
EPI CELLS # UR: ABNORMAL /HPF
FLUAV AG NPH QL: SIGNIFICANT CHANGE UP
FLUBV AG NPH QL: SIGNIFICANT CHANGE UP
GAS PNL BLDV: 130 MMOL/L — LOW (ref 136–145)
GAS PNL BLDV: SIGNIFICANT CHANGE UP
GLUCOSE BLDC GLUCOMTR-MCNC: 187 MG/DL — HIGH (ref 70–99)
GLUCOSE SERPL-MCNC: 232 MG/DL — HIGH (ref 70–99)
GLUCOSE UR QL: 250 MG/DL
GRAN CASTS # UR COMP ASSIST: ABNORMAL /LPF
HCO3 BLDV-SCNC: 27 MMOL/L — SIGNIFICANT CHANGE UP (ref 22–29)
HCT VFR BLD CALC: 38.6 % — LOW (ref 42–52)
HCT VFR BLDA CALC: 59 % — CRITICAL HIGH (ref 39–51)
HGB BLD CALC-MCNC: 19.5 G/DL — CRITICAL HIGH (ref 12.6–17.4)
HGB BLD-MCNC: 13.2 G/DL — LOW (ref 14–18)
IMM GRANULOCYTES NFR BLD AUTO: 0.6 % — HIGH (ref 0.1–0.3)
INR BLD: 1.15 RATIO — SIGNIFICANT CHANGE UP (ref 0.65–1.3)
KETONES UR-MCNC: 15
LACTATE BLDV-MCNC: 2.3 MMOL/L — HIGH (ref 0.5–2)
LACTATE SERPL-SCNC: 1.6 MMOL/L — SIGNIFICANT CHANGE UP (ref 0.7–2)
LEUKOCYTE ESTERASE UR-ACNC: NEGATIVE — SIGNIFICANT CHANGE UP
LYMPHOCYTES # BLD AUTO: 0.43 K/UL — LOW (ref 1.2–3.4)
LYMPHOCYTES # BLD AUTO: 7.9 % — LOW (ref 20.5–51.1)
MCHC RBC-ENTMCNC: 29.6 PG — SIGNIFICANT CHANGE UP (ref 27–31)
MCHC RBC-ENTMCNC: 34.2 G/DL — SIGNIFICANT CHANGE UP (ref 32–37)
MCV RBC AUTO: 86.5 FL — SIGNIFICANT CHANGE UP (ref 80–94)
MONOCYTES # BLD AUTO: 0.9 K/UL — HIGH (ref 0.1–0.6)
MONOCYTES NFR BLD AUTO: 16.5 % — HIGH (ref 1.7–9.3)
NEUTROPHILS # BLD AUTO: 4.08 K/UL — SIGNIFICANT CHANGE UP (ref 1.4–6.5)
NEUTROPHILS NFR BLD AUTO: 74.8 % — SIGNIFICANT CHANGE UP (ref 42.2–75.2)
NITRITE UR-MCNC: NEGATIVE — SIGNIFICANT CHANGE UP
NRBC # BLD: 0 /100 WBCS — SIGNIFICANT CHANGE UP (ref 0–0)
OSMOLALITY SERPL: 296 MOS/KG — SIGNIFICANT CHANGE UP (ref 280–301)
PCO2 BLDV: 43 MMHG — SIGNIFICANT CHANGE UP (ref 42–55)
PH BLDV: 7.41 — SIGNIFICANT CHANGE UP (ref 7.32–7.43)
PH UR: 5.5 — SIGNIFICANT CHANGE UP (ref 5–8)
PLATELET # BLD AUTO: 183 K/UL — SIGNIFICANT CHANGE UP (ref 130–400)
PO2 BLDV: 29 MMHG — SIGNIFICANT CHANGE UP
POTASSIUM BLDV-SCNC: 3.7 MMOL/L — SIGNIFICANT CHANGE UP (ref 3.5–5.1)
POTASSIUM SERPL-MCNC: 3.9 MMOL/L — SIGNIFICANT CHANGE UP (ref 3.5–5)
POTASSIUM SERPL-SCNC: 3.9 MMOL/L — SIGNIFICANT CHANGE UP (ref 3.5–5)
PROT SERPL-MCNC: 7.3 G/DL — SIGNIFICANT CHANGE UP (ref 6–8)
PROT UR-MCNC: >=300 MG/DL
PROTHROM AB SERPL-ACNC: 13.2 SEC — HIGH (ref 9.95–12.87)
RBC # BLD: 4.46 M/UL — LOW (ref 4.7–6.1)
RBC # FLD: 13.6 % — SIGNIFICANT CHANGE UP (ref 11.5–14.5)
RBC CASTS # UR COMP ASSIST: ABNORMAL /HPF
RSV RNA NPH QL NAA+NON-PROBE: SIGNIFICANT CHANGE UP
SAO2 % BLDV: 42.4 % — SIGNIFICANT CHANGE UP
SARS-COV-2 RNA SPEC QL NAA+PROBE: DETECTED
SODIUM SERPL-SCNC: 137 MMOL/L — SIGNIFICANT CHANGE UP (ref 135–146)
SP GR SPEC: >=1.03 (ref 1.01–1.03)
TROPONIN T SERPL-MCNC: <0.01 NG/ML — SIGNIFICANT CHANGE UP
UROBILINOGEN FLD QL: 0.2 MG/DL — SIGNIFICANT CHANGE UP
WBC # BLD: 5.45 K/UL — SIGNIFICANT CHANGE UP (ref 4.8–10.8)
WBC # FLD AUTO: 5.45 K/UL — SIGNIFICANT CHANGE UP (ref 4.8–10.8)
WBC UR QL: SIGNIFICANT CHANGE UP /HPF

## 2023-01-26 PROCEDURE — 74177 CT ABD & PELVIS W/CONTRAST: CPT | Mod: 26,MA

## 2023-01-26 PROCEDURE — 93010 ELECTROCARDIOGRAM REPORT: CPT

## 2023-01-26 PROCEDURE — 99285 EMERGENCY DEPT VISIT HI MDM: CPT

## 2023-01-26 PROCEDURE — 70450 CT HEAD/BRAIN W/O DYE: CPT | Mod: 26,MA

## 2023-01-26 PROCEDURE — 71045 X-RAY EXAM CHEST 1 VIEW: CPT | Mod: 26

## 2023-01-26 RX ORDER — GLUCAGON INJECTION, SOLUTION 0.5 MG/.1ML
1 INJECTION, SOLUTION SUBCUTANEOUS ONCE
Refills: 0 | Status: DISCONTINUED | OUTPATIENT
Start: 2023-01-26 | End: 2023-02-01

## 2023-01-26 RX ORDER — METOPROLOL TARTRATE 50 MG
100 TABLET ORAL DAILY
Refills: 0 | Status: DISCONTINUED | OUTPATIENT
Start: 2023-01-26 | End: 2023-01-26

## 2023-01-26 RX ORDER — SODIUM CHLORIDE 9 MG/ML
2000 INJECTION, SOLUTION INTRAVENOUS ONCE
Refills: 0 | Status: COMPLETED | OUTPATIENT
Start: 2023-01-26 | End: 2023-01-26

## 2023-01-26 RX ORDER — DEXTROSE 50 % IN WATER 50 %
25 SYRINGE (ML) INTRAVENOUS ONCE
Refills: 0 | Status: DISCONTINUED | OUTPATIENT
Start: 2023-01-26 | End: 2023-02-01

## 2023-01-26 RX ORDER — INSULIN LISPRO 100/ML
8 VIAL (ML) SUBCUTANEOUS
Refills: 0 | Status: DISCONTINUED | OUTPATIENT
Start: 2023-01-26 | End: 2023-02-01

## 2023-01-26 RX ORDER — ATORVASTATIN CALCIUM 80 MG/1
40 TABLET, FILM COATED ORAL AT BEDTIME
Refills: 0 | Status: DISCONTINUED | OUTPATIENT
Start: 2023-01-26 | End: 2023-02-01

## 2023-01-26 RX ORDER — DEXTROSE 50 % IN WATER 50 %
15 SYRINGE (ML) INTRAVENOUS ONCE
Refills: 0 | Status: DISCONTINUED | OUTPATIENT
Start: 2023-01-26 | End: 2023-02-01

## 2023-01-26 RX ORDER — PANTOPRAZOLE SODIUM 20 MG/1
40 TABLET, DELAYED RELEASE ORAL
Refills: 0 | Status: DISCONTINUED | OUTPATIENT
Start: 2023-01-26 | End: 2023-02-01

## 2023-01-26 RX ORDER — GABAPENTIN 400 MG/1
300 CAPSULE ORAL THREE TIMES A DAY
Refills: 0 | Status: DISCONTINUED | OUTPATIENT
Start: 2023-01-26 | End: 2023-02-01

## 2023-01-26 RX ORDER — ACETAMINOPHEN 500 MG
650 TABLET ORAL EVERY 6 HOURS
Refills: 0 | Status: DISCONTINUED | OUTPATIENT
Start: 2023-01-26 | End: 2023-02-01

## 2023-01-26 RX ORDER — CHLORHEXIDINE GLUCONATE 213 G/1000ML
1 SOLUTION TOPICAL
Refills: 0 | Status: DISCONTINUED | OUTPATIENT
Start: 2023-01-26 | End: 2023-02-01

## 2023-01-26 RX ORDER — AMLODIPINE BESYLATE 2.5 MG/1
10 TABLET ORAL DAILY
Refills: 0 | Status: DISCONTINUED | OUTPATIENT
Start: 2023-01-26 | End: 2023-02-01

## 2023-01-26 RX ORDER — SODIUM CHLORIDE 9 MG/ML
1000 INJECTION INTRAMUSCULAR; INTRAVENOUS; SUBCUTANEOUS
Refills: 0 | Status: DISCONTINUED | OUTPATIENT
Start: 2023-01-26 | End: 2023-02-01

## 2023-01-26 RX ORDER — ACETAMINOPHEN 500 MG
650 TABLET ORAL ONCE
Refills: 0 | Status: COMPLETED | OUTPATIENT
Start: 2023-01-26 | End: 2023-01-26

## 2023-01-26 RX ORDER — FUROSEMIDE 40 MG
40 TABLET ORAL DAILY
Refills: 0 | Status: DISCONTINUED | OUTPATIENT
Start: 2023-01-26 | End: 2023-02-01

## 2023-01-26 RX ORDER — ONDANSETRON 8 MG/1
4 TABLET, FILM COATED ORAL EVERY 8 HOURS
Refills: 0 | Status: DISCONTINUED | OUTPATIENT
Start: 2023-01-26 | End: 2023-02-01

## 2023-01-26 RX ORDER — SODIUM CHLORIDE 9 MG/ML
1000 INJECTION, SOLUTION INTRAVENOUS
Refills: 0 | Status: DISCONTINUED | OUTPATIENT
Start: 2023-01-26 | End: 2023-02-01

## 2023-01-26 RX ORDER — ENOXAPARIN SODIUM 100 MG/ML
40 INJECTION SUBCUTANEOUS EVERY 24 HOURS
Refills: 0 | Status: DISCONTINUED | OUTPATIENT
Start: 2023-01-26 | End: 2023-02-01

## 2023-01-26 RX ORDER — LISINOPRIL 2.5 MG/1
40 TABLET ORAL DAILY
Refills: 0 | Status: DISCONTINUED | OUTPATIENT
Start: 2023-01-26 | End: 2023-02-01

## 2023-01-26 RX ORDER — INSULIN LISPRO 100/ML
VIAL (ML) SUBCUTANEOUS AT BEDTIME
Refills: 0 | Status: DISCONTINUED | OUTPATIENT
Start: 2023-01-26 | End: 2023-02-01

## 2023-01-26 RX ORDER — METOPROLOL TARTRATE 50 MG
50 TABLET ORAL
Refills: 0 | Status: DISCONTINUED | OUTPATIENT
Start: 2023-01-26 | End: 2023-02-01

## 2023-01-26 RX ORDER — CEFTRIAXONE 500 MG/1
2000 INJECTION, POWDER, FOR SOLUTION INTRAMUSCULAR; INTRAVENOUS ONCE
Refills: 0 | Status: COMPLETED | OUTPATIENT
Start: 2023-01-26 | End: 2023-01-26

## 2023-01-26 RX ORDER — INSULIN GLARGINE 100 [IU]/ML
16 INJECTION, SOLUTION SUBCUTANEOUS AT BEDTIME
Refills: 0 | Status: DISCONTINUED | OUTPATIENT
Start: 2023-01-26 | End: 2023-02-01

## 2023-01-26 RX ORDER — INSULIN LISPRO 100/ML
VIAL (ML) SUBCUTANEOUS
Refills: 0 | Status: DISCONTINUED | OUTPATIENT
Start: 2023-01-26 | End: 2023-02-01

## 2023-01-26 RX ORDER — DEXTROSE 50 % IN WATER 50 %
12.5 SYRINGE (ML) INTRAVENOUS ONCE
Refills: 0 | Status: DISCONTINUED | OUTPATIENT
Start: 2023-01-26 | End: 2023-02-01

## 2023-01-26 RX ORDER — ACYCLOVIR SODIUM 500 MG
1000 VIAL (EA) INTRAVENOUS ONCE
Refills: 0 | Status: COMPLETED | OUTPATIENT
Start: 2023-01-26 | End: 2023-01-26

## 2023-01-26 RX ADMIN — Medication 270 MILLIGRAM(S): at 17:26

## 2023-01-26 RX ADMIN — Medication 650 MILLIGRAM(S): at 11:53

## 2023-01-26 RX ADMIN — CEFTRIAXONE 100 MILLIGRAM(S): 500 INJECTION, POWDER, FOR SOLUTION INTRAMUSCULAR; INTRAVENOUS at 15:31

## 2023-01-26 RX ADMIN — SODIUM CHLORIDE 2000 MILLILITER(S): 9 INJECTION, SOLUTION INTRAVENOUS at 11:51

## 2023-01-26 RX ADMIN — Medication 650 MILLIGRAM(S): at 17:04

## 2023-01-26 RX ADMIN — AMLODIPINE BESYLATE 10 MILLIGRAM(S): 2.5 TABLET ORAL at 23:21

## 2023-01-26 RX ADMIN — Medication 50 MILLIGRAM(S): at 18:44

## 2023-01-26 NOTE — H&P ADULT - NSHPLABSRESULTS_GEN_ALL_CORE
01-26    137  |  96<L>  |  14  ----------------------------<  232<H>  3.9   |  25  |  1.2    Ca    9.9      26 Jan 2023 12:49    TPro  7.3  /  Alb  5.0  /  TBili  1.0  /  DBili  x   /  AST  19  /  ALT  16  /  AlkPhos  74  01-26                            13.2   5.45  )-----------( 183      ( 26 Jan 2023 11:20 )             38.6     CAPILLARY BLOOD GLUCOSE        CARDIAC MARKERS ( 26 Jan 2023 11:20 )  x     / <0.01 ng/mL / x     / x     / x        < from: CT Abdomen and Pelvis w/ IV Cont (01.26.23 @ 14:19) >      < from: CT Head No Cont (01.26.23 @ 11:43) >    IMPRESSION:  No acute intracranial pathology. No evidence of midline shift, mass   effect or intracranial hemorrhage.    Moderate chronic microvascular type changes.    < from: Xray Chest 1 View-PORTABLE IMMEDIATE (01.26.23 @ 10:50) >      Impression:    No radiographic evidence of acute cardiopulmonary disease.    < end of copied text >

## 2023-01-26 NOTE — H&P ADULT - HISTORY OF PRESENT ILLNESS
68-year-old male past medical history of CAD status post stents diabetes hypertension hyperlipidemia coming in here for fevers chills and altered mental status.  Patient was out with his friends and family on Sunday and has been feeling slightly worse ever since.  Patient normally walks with a walker and has full conversations patient here today however ANO x0 not answering any questions fingerstick by EMS was 330s.    found to be covid + in ER, given rocephin / acyclovir / ivf   now more responsive and close to baseline orientation and LA improving  history obtained from son   states DNR     68-year-old male past medical history of CAD status post stents diabetes hypertension hyperlipidemia coming in here for fevers chills and altered mental status.  Patient was out with his friends and family on Sunday and has been feeling slightly worse ever since.  Patient normally walks with a walker and has full conversations patient here today however ANO x0 not answering any questions fingerstick by EMS was 330s.    as per son, his dad is very independent but became increasingly confused x 2 days , never had covid in past and has 3 vaccine against it   found to be covid + in ER, given rocephin / acyclovir / ivf   now more responsive and close to baseline orientation and LA improving     stop smoking in 2008 after MI

## 2023-01-26 NOTE — ED ADULT NURSE REASSESSMENT NOTE - NS ED NURSE REASSESS COMMENT FT1
Pt wet S/P hankins cath removal, pt cleaned, redness and blood when cleaning penis area, rectal temperature 100.3, NATALIE Tate informed, safety maintained, son at bedside, will continue to monitor.

## 2023-01-26 NOTE — ED PROVIDER NOTE - CLINICAL SUMMARY MEDICAL DECISION MAKING FREE TEXT BOX
patient presents for increasing confusion and fever most likely secondary to infectious cause he was found to be + for covid.  however, given his initial presentation patient given iv fluids, iv antibiotics including rocephin as well as acyclovir empirically.  we obtained labs which does not reveal elevated wbc coung in addition per my independent review of ekg it is not consistent with stemi, per my review of xray patient has increasing infiltrates over the course of the Ed patient has made steady improvement now aoxo 3 per son close to baseline mental status given his initial presentation combined with past medical history I will admit for futher management we discussed the case with Dr. Sethi

## 2023-01-26 NOTE — ED PROVIDER NOTE - OBJECTIVE STATEMENT
68-year-old male past medical history of CAD status post stents diabetes hypertension hyperlipidemia coming in here for fevers chills and altered mental status.  Patient was out with his friends and family on Sunday and has been feeling slightly worse ever since.  Patient normally walks with a walker and has full conversations patient here today however ANO x0 not answering any questions fingerstick by EMS was 330s.

## 2023-01-26 NOTE — H&P ADULT - ASSESSMENT
68-year-old male past medical history of CAD status post stents diabetes hypertension hyperlipidemia coming in here for fevers chills and altered mental status    # AMS likely due to infection   # COVID +  - follow up bld / urine cx   - repeat LA in am   - procal level  - inflam markers   - ID consult  - c/w IVF     #Hypertension  -  - C/w amlodipine 10 mg PO daily  - C/w lisinopril 40 mg PO daily (on benazapril at home)    #CAD s/p stent 2008  #HLD  - C/w Toprol 50 mg PO BID -- dosing/frequency confirmed   - C/w Lipitor 40 mg PO qhs    #DM  # neuropathy   - will hold PO meds , start insulin coverage with SS and FS   - c/w sandrita     #Anxiety/depression  - C/w Paxil 30 mg PO daily    #DVT ppx: Lovenox 40 mg SC daily  #GI ppx: Protonix 40 mg PO daily  #Diet: CC/DASH/TLC  #Activity: IAT; PT consult  #Dispo: From home,  #Code status: DNR/DNI , family to bring in documentation     PMD : Dr Garcia   cards : dr beach     d/w hospitalist DR. You  68-year-old male past medical history of CAD status post stents diabetes hypertension hyperlipidemia coming in here for fevers chills and altered mental status    # AMS likely due to infection   # COVID +  - follow up bld / urine cx   - repeat LA in am   - procal level  - inflam markers   - ID consult  - c/w IVF   - if ams not improved then will consider neuro / lumbar puncture   - UA +/- will c/w rocephin and await cx     #Hypertension  -  - C/w amlodipine 10 mg PO daily  - C/w lisinopril 40 mg PO daily (on benazapril at home)    #CAD s/p stent 2008  #HLD  - C/w Toprol 50 mg PO BID -- dosing/frequency confirmed   - C/w Lipitor 40 mg PO qhs    #DM  # neuropathy   - will hold PO meds , start insulin coverage with SS and FS   - c/w sandrita     #Anxiety/depression  - C/w Paxil 30 mg PO daily    #DVT ppx: Lovenox 40 mg SC daily  #GI ppx: Protonix 40 mg PO daily  #Diet: CC/DASH/TLC  #Activity: IAT; PT consult  #Dispo: From home,  #Code status: DNR/DNI , family to bring in documentation     PMD : Dr Garcia   cards : dr beach     d/w hospitalist DR. You  68-year-old male past medical history of CAD status post stents diabetes hypertension hyperlipidemia coming in here for fevers chills and altered mental status    # AMS likely due to infection   # COVID +  - follow up bld / urine cx   - repeat LA in am   - procal level  - inflam markers   - ID consult  - c/w IVF   - if ams not improved then will consider neuro eval/ lumbar puncture   - UA +/- will c/w rocephin and await cx     #Hypertension  -  - C/w amlodipine 10 mg PO daily  - C/w lisinopril 40 mg PO daily (on benazapril at home)    #CAD s/p stent 2008  #HLD  - C/w Toprol 50 mg PO BID -- dosing/frequency confirmed   - C/w Lipitor 40 mg PO qhs    #DM  # neuropathy   - will hold PO meds , start insulin coverage with SS and FS   - c/w sandrita     #Anxiety/depression  - C/w Paxil 30 mg PO daily    #DVT ppx: Lovenox 40 mg SC daily  #GI ppx: Protonix 40 mg PO daily  #Diet: CC/DASH/TLC  #Activity: IAT; PT consult  #Dispo: From home,  #Code status: DNR/DNI , family to bring in documentation     PMD : Dr Garcia   cards : dr yong You  Attending Hospitalist

## 2023-01-26 NOTE — H&P ADULT - NSHPPHYSICALEXAM_GEN_ALL_CORE
Vital Signs Last 24 Hrs  T(C): 37.3 (26 Jan 2023 12:56), Max: 37.8 (26 Jan 2023 10:26)  T(F): 99.1 (26 Jan 2023 12:56), Max: 100.1 (26 Jan 2023 12:08)  HR: 111 (26 Jan 2023 12:56) (111 - 120)  BP: 129/88 (26 Jan 2023 10:26) (129/88 - 129/88)  RR: 18 (26 Jan 2023 12:56) (18 - 18)  SpO2: 98% (26 Jan 2023 12:56) (96% - 98%)    Parameters below as of 26 Jan 2023 12:56  Patient On (Oxygen Delivery Method): room air Vital Signs Last 24 Hrs  T(C): 37.3 (26 Jan 2023 12:56), Max: 37.8 (26 Jan 2023 10:26)  T(F): 99.1 (26 Jan 2023 12:56), Max: 100.1 (26 Jan 2023 12:08)  HR: 111 (26 Jan 2023 12:56) (111 - 120)  BP: 129/88 (26 Jan 2023 10:26) (129/88 - 129/88)  RR: 18 (26 Jan 2023 12:56) (18 - 18)  SpO2: 98% (26 Jan 2023 12:56) (96% - 98%)    Parameters below as of 26 Jan 2023 12:56  Patient On (Oxygen Delivery Method): room air      GENERAL:  69y/o Male NAD, resting comfortably.  HEAD:  Atraumatic, Normocephalic  EYES: EOMI, conjunctiva and sclera clear  NECK: Supple, no cervical lymphadenopathy, non-tender  CHEST/LUNG: Clear to auscultation bilaterally; No wheeze  HEART: Regular rate and rhythm; S1&S2  ABDOMEN: Soft, Nontender, Nondistended x 4 quadrants; Bowel sounds present  EXTREMITIES:   Peripheral Pulses Present, No clubbing, no cyanosis, or no edema, no calf tenderness  PSYCH: AAOx1, cooperative, appropriateL  SKIN: WNL Vital Signs Last 24 Hrs  T(C): 37.3 (26 Jan 2023 12:56), Max: 37.8 (26 Jan 2023 10:26)  T(F): 99.1 (26 Jan 2023 12:56), Max: 100.1 (26 Jan 2023 12:08)  HR: 111 (26 Jan 2023 12:56) (111 - 120)  BP: 129/88 (26 Jan 2023 10:26) (129/88 - 129/88)  RR: 18 (26 Jan 2023 12:56) (18 - 18)  SpO2: 98% (26 Jan 2023 12:56) (96% - 98%)    Parameters below as of 26 Jan 2023 12:56  Patient On (Oxygen Delivery Method): room air      GENERAL:  67y/o Male NAD, resting comfortably.  HEAD:  Atraumatic, Normocephalic  EYES: EOMI, conjunctiva and sclera clear  NECK: Supple, no cervical lymphadenopathy, non-tender  CHEST/LUNG: Clear to auscultation bilaterally; No wheeze  HEART: Regular rate and rhythm; S1&S2  ABDOMEN: Soft, Nontender, Nondistended x 4 quadrants; Bowel sounds present  EXTREMITIES:   Peripheral Pulses Present, No clubbing, no cyanosis, or no edema, no calf tenderness  PSYCH: AAOx1, cooperative, appropriate  SKIN: WNL

## 2023-01-26 NOTE — H&P ADULT - NS ATTEND AMEND GEN_ALL_CORE FT
68-year-old male past medical history of CAD status post stents diabetes hypertension hyperlipidemia coming in here for fevers chills and altered mental status    # AMS likely due to infection   # COVID +  - follow up bld / urine cx   - repeat LA in am   - procal level  - inflam markers   - ID consult  - c/w IVF   - if ams not improved then will consider neuro eval/ lumbar puncture   - UA +/- will c/w rocephin and await cx     #Hypertension  -  - C/w amlodipine 10 mg PO daily  - C/w lisinopril 40 mg PO daily (on benazapril at home)    #CAD s/p stent 2008  #HLD  - C/w Toprol 50 mg PO BID -- dosing/frequency confirmed   - C/w Lipitor 40 mg PO qhs    #DM  # neuropathy   - will hold PO meds , start insulin coverage with SS and FS   - c/w sandrita     #Anxiety/depression  - C/w Paxil 30 mg PO daily    #DVT ppx: Lovenox 40 mg SC daily  #GI ppx: Protonix 40 mg PO daily  #Diet: CC/DASH/TLC  #Activity: IAT; PT consult  #Dispo: From home,  #Code status: DNR/DNI , family to bring in documentation     PMD : Dr Garcia   cards : dr yong You  Attending Hospitalist

## 2023-01-26 NOTE — ED ADULT NURSE REASSESSMENT NOTE - NS ED NURSE REASSESS COMMENT FT1
Pt received alert with periods of confusion, family present at bedside, F/S 173mg/dl, IV medication infusing, pt resting comfortable in bed, covid+, safety in place, will continue to monitor Pt received alert with periods of confusion, family present at bedside, F/S 173mg/dl, IV medication infusing, VS , /pt resting comfortable in bed, covid+, safety in place, will continue to monitor Pt received alert with periods of confusion, family present at bedside, F/S 173mg/dl, IV medication infusing, VS , /76, pt resting comfortable in bed, covid+, safety in place, will continue to monitor

## 2023-01-26 NOTE — PATIENT PROFILE ADULT - FALL HARM RISK - UNIVERSAL INTERVENTIONS
Bed in lowest position, wheels locked, appropriate side rails in place/Call bell, personal items and telephone in reach/Instruct patient to call for assistance before getting out of bed or chair/Non-slip footwear when patient is out of bed/[place] to call system/Physically safe environment - no spills, clutter or unnecessary equipment/Purposeful Proactive Rounding/Room/bathroom lighting operational, light cord in reach

## 2023-01-26 NOTE — PATIENT PROFILE ADULT - DOES PATIENT HAVE ADVANCE DIRECTIVE
Telephone Encounter by Amparo Pedroza MD at 10/10/17 10:56 AM     Author:  Amparo Pedroza MD Service:  (none) Author Type:  Physician     Filed:  10/10/17 10:58 AM Encounter Date:  10/10/2017 Status:  Signed     :  Amparo Pedroza MD (Physician)            Hi Dr. Dill, I spoke to Ms. Jimenez from [facility].  Patient belongs to [facility].  They will be contacting patient soon to be seen in the clinic for further management.[PV1.1M]      Revision History        User Key Date/Time User Provider Type Action    > PV1.1 10/10/17 10:58 AM Amparo Pedroza MD Physician Sign    M - Manual             Yes

## 2023-01-26 NOTE — ED PROVIDER NOTE - CARE PLAN
Principal Discharge DX:	2019 novel coronavirus disease (COVID-19)  Secondary Diagnosis:	Confusion   1

## 2023-01-26 NOTE — ED PROVIDER NOTE - ATTENDING CONTRIBUTION TO CARE
I have personally performed a history and physical exam on this patient and personally directed the management of the patient. 68-year-old male past medical history of CAD status post stents diabetes hypertension hyperlipidemia coming in here for fevers chills and altered mental status.  Patient was out with his friends and family on Sunday and has been feeling slightly worse ever since. History is corroborated by patients son.  patient has had increasing fevers and increasing confusion over the past several days but since arriving in ed is improving   he denies any headache, visual changes cp , sob cough abd pain back pain dysuria or skin rashes     on exam patient initially was confused but has improved nc/at perrla eomi oropharynx clear patient has decreased bs b/l, abd-soft nt nd bs+ no guarding patient has no edema he is able to follow commands with ability to move all 4 extremities patient has no skin rashes     a/p patient presents for increasing confusion and fever most likely secondary to infectious cause he was found to be + for covid.  however, given his initial presentation patient given iv fluids, iv antibiotics including rocephin as well as acyclovir empirically.  we obtained labs which does not reveal elevated wbc coung in addition per my independent review of ekg it is not consistent with stemi, per my review of xray patient has increasing infiltrates over the course of the Ed patient has made steady improvement now aoxo 3 per son close to baseline mental status given his initial presentation combined with past medical history I will admit for futher management we discussed the case with Dr. Sethi

## 2023-01-26 NOTE — ED PROVIDER NOTE - PHYSICAL EXAMINATION
CONSTITUTIONAL:  in severe acute distress.   SKIN: hot, dry  HEAD: Normocephalic; atraumatic.  EYES: tracking people around room, no obvious injury/trauma  ENT: No nasal discharge; airway clear.  NECK: Supple; non tender.  CARD:  tachy rate and rhythm.   RESP: +inc WOB   ABD: soft ntnd  EXT: Normal ROM.    LYMPH: No acute cervical adenopathy.  NEURO: [id number], unable to answer questions appropriatly

## 2023-01-26 NOTE — ED PROVIDER NOTE - PROGRESS NOTE DETAILS
Patient much more conversational with son now at bedside symptoms seem to be resolving-Authored by Tony Yost spoke with CT tech, can scan abdomen before labs- SD Patient reassessed continues to answer questions more appropriately.  Currently ANO x3-Authored by Tony Yost

## 2023-01-26 NOTE — ED ADULT NURSE NOTE - NSIMPLEMENTINTERV_GEN_ALL_ED
Implemented All Fall Risk Interventions:  [place] to call system. Call bell, personal items and telephone within reach. Instruct patient to call for assistance. Room bathroom lighting operational. Non-slip footwear when patient is off stretcher. Physically safe environment: no spills, clutter or unnecessary equipment. Stretcher in lowest position, wheels locked, appropriate side rails in place. Provide visual cue, wrist band, yellow gown, etc. Monitor gait and stability. Monitor for mental status changes and reorient to person, place, and time. Review medications for side effects contributing to fall risk. Reinforce activity limits and safety measures with patient and family.

## 2023-01-27 LAB
A1C WITH ESTIMATED AVERAGE GLUCOSE RESULT: 6.7 % — HIGH (ref 4–5.6)
ALBUMIN SERPL ELPH-MCNC: 4.2 G/DL — SIGNIFICANT CHANGE UP (ref 3.5–5.2)
ALBUMIN SERPL ELPH-MCNC: 4.2 G/DL — SIGNIFICANT CHANGE UP (ref 3.5–5.2)
ALP SERPL-CCNC: 60 U/L — SIGNIFICANT CHANGE UP (ref 30–115)
ALP SERPL-CCNC: 61 U/L — SIGNIFICANT CHANGE UP (ref 30–115)
ALT FLD-CCNC: 13 U/L — SIGNIFICANT CHANGE UP (ref 0–41)
ALT FLD-CCNC: 15 U/L — SIGNIFICANT CHANGE UP (ref 0–41)
ANION GAP SERPL CALC-SCNC: 12 MMOL/L — SIGNIFICANT CHANGE UP (ref 7–14)
AST SERPL-CCNC: 18 U/L — SIGNIFICANT CHANGE UP (ref 0–41)
AST SERPL-CCNC: 24 U/L — SIGNIFICANT CHANGE UP (ref 0–41)
BILIRUB DIRECT SERPL-MCNC: <0.2 MG/DL — SIGNIFICANT CHANGE UP (ref 0–0.3)
BILIRUB INDIRECT FLD-MCNC: >0.4 MG/DL — SIGNIFICANT CHANGE UP (ref 0.2–1.2)
BILIRUB SERPL-MCNC: 0.6 MG/DL — SIGNIFICANT CHANGE UP (ref 0.2–1.2)
BILIRUB SERPL-MCNC: 0.7 MG/DL — SIGNIFICANT CHANGE UP (ref 0.2–1.2)
BUN SERPL-MCNC: 13 MG/DL — SIGNIFICANT CHANGE UP (ref 10–20)
CALCIUM SERPL-MCNC: 8.9 MG/DL — SIGNIFICANT CHANGE UP (ref 8.4–10.5)
CHLORIDE SERPL-SCNC: 99 MMOL/L — SIGNIFICANT CHANGE UP (ref 98–110)
CO2 SERPL-SCNC: 24 MMOL/L — SIGNIFICANT CHANGE UP (ref 17–32)
CREAT SERPL-MCNC: 1.1 MG/DL — SIGNIFICANT CHANGE UP (ref 0.7–1.5)
CREAT SERPL-MCNC: 1.4 MG/DL — SIGNIFICANT CHANGE UP (ref 0.7–1.5)
CULTURE RESULTS: NO GROWTH — SIGNIFICANT CHANGE UP
D DIMER BLD IA.RAPID-MCNC: <150 NG/ML DDU — SIGNIFICANT CHANGE UP
EGFR: 55 ML/MIN/1.73M2 — LOW
EGFR: 74 ML/MIN/1.73M2 — SIGNIFICANT CHANGE UP
ESTIMATED AVERAGE GLUCOSE: 146 MG/DL — HIGH (ref 68–114)
FERRITIN SERPL-MCNC: 178 NG/ML — SIGNIFICANT CHANGE UP (ref 30–400)
FIBRINOGEN PPP-MCNC: 530 MG/DL — SIGNIFICANT CHANGE UP (ref 204.4–570.6)
GLUCOSE BLDC GLUCOMTR-MCNC: 110 MG/DL — HIGH (ref 70–99)
GLUCOSE BLDC GLUCOMTR-MCNC: 149 MG/DL — HIGH (ref 70–99)
GLUCOSE BLDC GLUCOMTR-MCNC: 169 MG/DL — HIGH (ref 70–99)
GLUCOSE BLDC GLUCOMTR-MCNC: 89 MG/DL — SIGNIFICANT CHANGE UP (ref 70–99)
GLUCOSE SERPL-MCNC: 153 MG/DL — HIGH (ref 70–99)
HCT VFR BLD CALC: 35.5 % — LOW (ref 42–52)
HGB BLD-MCNC: 12.1 G/DL — LOW (ref 14–18)
INR BLD: 1.16 RATIO — SIGNIFICANT CHANGE UP (ref 0.65–1.3)
LACTATE SERPL-SCNC: 0.9 MMOL/L — SIGNIFICANT CHANGE UP (ref 0.7–2)
MCHC RBC-ENTMCNC: 29.6 PG — SIGNIFICANT CHANGE UP (ref 27–31)
MCHC RBC-ENTMCNC: 34.1 G/DL — SIGNIFICANT CHANGE UP (ref 32–37)
MCV RBC AUTO: 86.8 FL — SIGNIFICANT CHANGE UP (ref 80–94)
NRBC # BLD: 0 /100 WBCS — SIGNIFICANT CHANGE UP (ref 0–0)
PLATELET # BLD AUTO: 171 K/UL — SIGNIFICANT CHANGE UP (ref 130–400)
POTASSIUM SERPL-MCNC: 3.7 MMOL/L — SIGNIFICANT CHANGE UP (ref 3.5–5)
POTASSIUM SERPL-SCNC: 3.7 MMOL/L — SIGNIFICANT CHANGE UP (ref 3.5–5)
PROT SERPL-MCNC: 6.1 G/DL — SIGNIFICANT CHANGE UP (ref 6–8)
PROT SERPL-MCNC: 6.6 G/DL — SIGNIFICANT CHANGE UP (ref 6–8)
PROTHROM AB SERPL-ACNC: 13.3 SEC — HIGH (ref 9.95–12.87)
RBC # BLD: 4.09 M/UL — LOW (ref 4.7–6.1)
RBC # FLD: 13.5 % — SIGNIFICANT CHANGE UP (ref 11.5–14.5)
SODIUM SERPL-SCNC: 135 MMOL/L — SIGNIFICANT CHANGE UP (ref 135–146)
SPECIMEN SOURCE: SIGNIFICANT CHANGE UP
TSH SERPL-MCNC: 1.84 UIU/ML — SIGNIFICANT CHANGE UP (ref 0.27–4.2)
VIT B12 SERPL-MCNC: 292 PG/ML — SIGNIFICANT CHANGE UP (ref 232–1245)
WBC # BLD: 3.94 K/UL — LOW (ref 4.8–10.8)
WBC # FLD AUTO: 3.94 K/UL — LOW (ref 4.8–10.8)

## 2023-01-27 PROCEDURE — 99232 SBSQ HOSP IP/OBS MODERATE 35: CPT

## 2023-01-27 RX ORDER — CEFTRIAXONE 500 MG/1
1000 INJECTION, POWDER, FOR SOLUTION INTRAMUSCULAR; INTRAVENOUS EVERY 24 HOURS
Refills: 0 | Status: DISCONTINUED | OUTPATIENT
Start: 2023-01-27 | End: 2023-02-01

## 2023-01-27 RX ORDER — REMDESIVIR 5 MG/ML
INJECTION INTRAVENOUS
Refills: 0 | Status: COMPLETED | OUTPATIENT
Start: 2023-01-27 | End: 2023-01-31

## 2023-01-27 RX ORDER — REMDESIVIR 5 MG/ML
200 INJECTION INTRAVENOUS EVERY 24 HOURS
Refills: 0 | Status: COMPLETED | OUTPATIENT
Start: 2023-01-27 | End: 2023-01-27

## 2023-01-27 RX ORDER — REMDESIVIR 5 MG/ML
100 INJECTION INTRAVENOUS EVERY 24 HOURS
Refills: 0 | Status: COMPLETED | OUTPATIENT
Start: 2023-01-28 | End: 2023-01-31

## 2023-01-27 RX ADMIN — CHLORHEXIDINE GLUCONATE 1 APPLICATION(S): 213 SOLUTION TOPICAL at 06:02

## 2023-01-27 RX ADMIN — Medication 8 UNIT(S): at 08:26

## 2023-01-27 RX ADMIN — GABAPENTIN 300 MILLIGRAM(S): 400 CAPSULE ORAL at 13:13

## 2023-01-27 RX ADMIN — Medication 30 MILLIGRAM(S): at 12:33

## 2023-01-27 RX ADMIN — CEFTRIAXONE 100 MILLIGRAM(S): 500 INJECTION, POWDER, FOR SOLUTION INTRAMUSCULAR; INTRAVENOUS at 14:22

## 2023-01-27 RX ADMIN — Medication 2: at 08:26

## 2023-01-27 RX ADMIN — GABAPENTIN 300 MILLIGRAM(S): 400 CAPSULE ORAL at 06:00

## 2023-01-27 RX ADMIN — GABAPENTIN 300 MILLIGRAM(S): 400 CAPSULE ORAL at 00:17

## 2023-01-27 RX ADMIN — Medication 40 MILLIGRAM(S): at 08:12

## 2023-01-27 RX ADMIN — ATORVASTATIN CALCIUM 40 MILLIGRAM(S): 80 TABLET, FILM COATED ORAL at 21:57

## 2023-01-27 RX ADMIN — ATORVASTATIN CALCIUM 40 MILLIGRAM(S): 80 TABLET, FILM COATED ORAL at 00:17

## 2023-01-27 RX ADMIN — Medication 8 UNIT(S): at 12:27

## 2023-01-27 RX ADMIN — GABAPENTIN 300 MILLIGRAM(S): 400 CAPSULE ORAL at 21:57

## 2023-01-27 RX ADMIN — AMLODIPINE BESYLATE 10 MILLIGRAM(S): 2.5 TABLET ORAL at 08:12

## 2023-01-27 RX ADMIN — SODIUM CHLORIDE 75 MILLILITER(S): 9 INJECTION INTRAMUSCULAR; INTRAVENOUS; SUBCUTANEOUS at 21:57

## 2023-01-27 RX ADMIN — LISINOPRIL 40 MILLIGRAM(S): 2.5 TABLET ORAL at 08:11

## 2023-01-27 RX ADMIN — Medication 8 UNIT(S): at 17:16

## 2023-01-27 RX ADMIN — PANTOPRAZOLE SODIUM 40 MILLIGRAM(S): 20 TABLET, DELAYED RELEASE ORAL at 06:00

## 2023-01-27 RX ADMIN — REMDESIVIR 200 MILLIGRAM(S): 5 INJECTION INTRAVENOUS at 17:15

## 2023-01-27 RX ADMIN — ENOXAPARIN SODIUM 40 MILLIGRAM(S): 100 INJECTION SUBCUTANEOUS at 17:19

## 2023-01-27 RX ADMIN — SODIUM CHLORIDE 75 MILLILITER(S): 9 INJECTION INTRAMUSCULAR; INTRAVENOUS; SUBCUTANEOUS at 00:16

## 2023-01-27 RX ADMIN — Medication 50 MILLIGRAM(S): at 17:19

## 2023-01-27 RX ADMIN — INSULIN GLARGINE 16 UNIT(S): 100 INJECTION, SOLUTION SUBCUTANEOUS at 00:16

## 2023-01-27 RX ADMIN — Medication 50 MILLIGRAM(S): at 08:11

## 2023-01-27 NOTE — PHYSICAL THERAPY INITIAL EVALUATION ADULT - LIVES WITH, PROFILE
pt. reported lives in apartment with 6 AVILA, pt. was slow to answer and confused throughout evaluation/alone

## 2023-01-27 NOTE — CONSULT NOTE ADULT - SUBJECTIVE AND OBJECTIVE BOX
NIYAHARTURO  68y, Male  Allergy: No Known Allergies      CHIEF COMPLAINT: fever (27 Jan 2023 09:24)      LOS  1d    HPI:  history obtained from son   states DNR     68-year-old male past medical history of CAD status post stents diabetes hypertension hyperlipidemia coming in here for fevers chills and altered mental status.  Patient was out with his friends and family on Sunday and has been feeling slightly worse ever since.  Patient normally walks with a walker and has full conversations patient here today however ANO x0 not answering any questions fingerstick by EMS was 330s.    as per son, his dad is very independent but became increasingly confused x 2 days , never had covid in past and has 3 vaccine against it   found to be covid + in ER, given rocephin / acyclovir / ivf   now more responsive and close to baseline orientation and LA improving     stop smoking in 2008 after MI  (26 Jan 2023 16:52)      INFECTIOUS DISEASE HISTORY:  History as above.  Started to feel unwell starting Teusday.   Wednesday evening into Thursday was having worsening mental status.   Today oriented to person, year, month -- cannot give name of hospital.   Denies any headaches, neck stiffness.   Denies nausea, vomiting, diarrhea, abdominal pain, dysuria.   Currently on room air     PAST MEDICAL & SURGICAL HISTORY:  CAD S/P percutaneous coronary angioplasty      HTN (hypertension)      Diabetes mellitus      Hyperlipidemia      Anxiety      No significant past surgical history          FAMILY HISTORY  No pertinent family history in first degree relatives        SOCIAL HISTORY  Social History:  see hpi     denies etoh (26 Jan 2023 16:52)        ROS  General: Denies rigors, nightsweats  HEENT: Denies headache, rhinorrhea, sore throat, eye pain  CV: Denies CP, palpitations  PULM: Denies wheezing, hemoptysis  GI: Denies hematemesis, hematochezia, melena  : Denies discharge, hematuria  MSK: Denies arthralgias, myalgias  SKIN: Denies rash, lesions  NEURO: Denies paresthesias, weakness  PSYCH: Denies depression, anxiety    VITALS:  T(F): 97.9, Max: 100.3 (01-26-23 @ 21:38)  HR: 87  BP: 199/99  RR: 20Vital Signs Last 24 Hrs  T(C): 36.6 (27 Jan 2023 05:28), Max: 37.9 (26 Jan 2023 21:38)  T(F): 97.9 (27 Jan 2023 05:28), Max: 100.3 (26 Jan 2023 21:38)  HR: 87 (27 Jan 2023 08:20) (51 - 118)  BP: 199/99 (27 Jan 2023 08:20) (135/86 - 199/99)  BP(mean): --  RR: 20 (27 Jan 2023 08:20) (20 - 22)  SpO2: 97% (27 Jan 2023 08:20) (94% - 97%)    Parameters below as of 27 Jan 2023 08:20  Patient On (Oxygen Delivery Method): room air        PHYSICAL EXAM:  Gen: NAD, resting in bed  HEENT: Normocephalic, atraumatic  Neck: supple, no lymphadenopathy  CV: Regular rate & regular rhythm  Lungs: decreased BS at bases, no fremitus  Abdomen: Soft, BS present  Ext: Warm, well perfused  Neuro: non focal, awake  Skin: no rash, no erythema  Lines: no phlebitis    TESTS & MEASUREMENTS:                        12.1   3.94  )-----------( 171      ( 27 Jan 2023 05:50 )             35.5     01-27    135  |  99  |  13  ----------------------------<  153<H>  3.7   |  24  |  1.1    Ca    8.9      27 Jan 2023 05:50    TPro  6.1  /  Alb  4.2  /  TBili  0.7  /  DBili  x   /  AST  18  /  ALT  13  /  AlkPhos  61  01-27      LIVER FUNCTIONS - ( 27 Jan 2023 05:50 )  Alb: 4.2 g/dL / Pro: 6.1 g/dL / ALK PHOS: 61 U/L / ALT: 13 U/L / AST: 18 U/L / GGT: x           Urinalysis Basic - ( 26 Jan 2023 12:20 )    Color: Yellow / Appearance: Clear / SG: >=1.030 / pH: x  Gluc: x / Ketone: 15  / Bili: Negative / Urobili: 0.2 mg/dL   Blood: x / Protein: >=300 mg/dL / Nitrite: Negative   Leuk Esterase: Negative / RBC: 6-10 /HPF / WBC 1-2 /HPF   Sq Epi: x / Non Sq Epi: Few /HPF / Bacteria: Few        Culture - Blood (collected 04-19-22 @ 21:15)  Source: .Blood Blood  Final Report (04-25-22 @ 02:01):    No Growth Final    Culture - Urine (collected 04-19-22 @ 16:30)  Source: Clean Catch Clean Catch (Midstream)  Final Report (04-21-22 @ 10:08):    No growth    Culture - Blood (collected 04-19-22 @ 04:30)  Source: .Blood Blood  Final Report (04-24-22 @ 23:01):    No Growth Final        Lactate, Blood: 0.9 mmol/L (01-27-23 @ 05:50)  Lactate, Blood: 1.6 mmol/L (01-26-23 @ 14:35)  Blood Gas Venous - Lactate: 2.30 mmol/L (01-26-23 @ 12:32)      INFECTIOUS DISEASES TESTING  COVID-19 PCR: NotDetec (04-25-22 @ 06:09)  Procalcitonin, Serum: 0.28 ng/mL (04-21-22 @ 10:50)  Legionella Antigen, Urine: Negative (04-20-22 @ 14:54)  Procalcitonin, Serum: 0.17 ng/mL (04-19-22 @ 04:30)  COVID-19 PCR: NotDetec (04-18-22 @ 22:25)      RADIOLOGY & ADDITIONAL TESTS:  I have personally reviewed the last Chest xray  CXR      CT  CT Abdomen and Pelvis w/ IV Cont:   ACC: [id number] EXAM:  CT ABDOMEN AND PELVIS IC   ORDERED BY: WOODY VALERA     PROCEDURE DATE:  01/26/2023          INTERPRETATION:  CLINICAL STATEMENT: Sepsis    TECHNIQUE: Contiguous axial CT images were obtained from the lower chest   to the pubic symphysis following administration of 100cc Optiray 320   intravenous contrast.  Oral contrast was not administered.  Reformatted   images in the coronal and sagittal planes were acquired.    COMPARISON CT: April 18, 2022    OTHER STUDIES USED FOR CORRELATION: None.      FINDINGS:    LOWER CHEST: Unremarkable.    HEPATOBILIARY: Scattered hepatic hypodensities too small to further   characterize. No biliary ductal dilatation.    SPLEEN: Unremarkable.    PANCREAS: Unchanged pancreatic head atrophy.    ADRENAL GLANDS: Stable 2.0 cm right adrenal nodule (series 303 image 98).    KIDNEYS: Symmetric renal enhancement without hydronephrosis bilaterally.    ABDOMINOPELVIC NODES: Unremarkable.    PELVIC ORGANS: Unremarkable.    PERITONEUM/MESENTERY/BOWEL: No bowel obstruction, pneumoperitoneum or   ascites. Scattered colonic diverticulosis without diverticulitis. Normal   caliber appendix..    BONES/SOFT TISSUES: Small fat-containing left internal hernia.   Degenerative changes of the spine..    OTHER: Atherosclerosis abdominal aorta and branches.      IMPRESSION:    No CT evidence for acute intra-abdominal or pelvic pathology.    --- End of Report ---            ELLIOT LANDAU MD; Attending Radiologist  This document has been electronically signed. Jan 26 2023  2:36PM (01-26-23 @ 14:19)      CARDIOLOGY TESTING  12 Lead ECG:   Ventricular Rate 114 BPM    Atrial Rate 114 BPM    P-R Interval 142 ms    QRS Duration 80 ms    Q-T Interval 342 ms    QTC Calculation(Bazett) 471 ms    P Axis 45 degrees    R Axis 47 degrees    T Axis 21 degrees    Diagnosis Line Sinus tachycardiawith occasional Premature ventricular complexes and Fusion  complexes  Possible Left atrial enlargement  Confirmed by CHINO GARLAND MD ([id number]) on 1/26/2023 7:28:49 PM (01-26-23 @ 11:44)  12 Lead ECG:   Ventricular Rate 113 BPM    Atrial Rate 113 BPM    P-R Interval 134 ms    QRS Duration 80 ms    Q-T Interval 342 ms    QTC Calculation(Bazett) 469 ms    P Axis 54 degrees    R Axis 55 degrees    T Axis 18 degrees    Diagnosis Line Sinus tachycardiawith frequent Premature ventricular complexes    Confirmed by CHINO GARLAND MD ([id number]) on 1/26/2023 7:28:36 PM (01-26-23 @ 11:44)      MEDICATIONS  amLODIPine   Tablet 10 Oral daily  atorvastatin 40 Oral at bedtime  chlorhexidine 2% Cloths 1 Topical <User Schedule>  dextrose 5%. 1000 IV Continuous <Continuous>  dextrose 5%. 1000 IV Continuous <Continuous>  dextrose 50% Injectable 25 IV Push once  dextrose 50% Injectable 12.5 IV Push once  dextrose 50% Injectable 25 IV Push once  enoxaparin Injectable 40 SubCutaneous every 24 hours  furosemide    Tablet 40 Oral daily  gabapentin 300 Oral three times a day  glucagon  Injectable 1 IntraMuscular once  insulin glargine Injectable (LANTUS) 16 SubCutaneous at bedtime  insulin lispro (ADMELOG) corrective regimen sliding scale  SubCutaneous three times a day before meals  insulin lispro (ADMELOG) corrective regimen sliding scale  SubCutaneous at bedtime  insulin lispro Injectable (ADMELOG) 8 SubCutaneous before breakfast  insulin lispro Injectable (ADMELOG) 8 SubCutaneous before lunch  insulin lispro Injectable (ADMELOG) 8 SubCutaneous before dinner  lisinopril 40 Oral daily  metoprolol succinate ER 50 Oral two times a day  pantoprazole    Tablet 40 Oral before breakfast  PARoxetine 30 Oral daily  sodium chloride 0.9%. 1000 IV Continuous <Continuous>      Weight  Weight (kg): 105 (01-26-23 @ 15:51)    ANTIBIOTICS:      ALLERGIES:  No Known Allergies

## 2023-01-27 NOTE — PHYSICAL THERAPY INITIAL EVALUATION ADULT - PERTINENT HX OF CURRENT PROBLEM, REHAB EVAL
Pt. admitted due to AMS, COVID+ and weakness. Pt. is AAOx1, asking for his son. Pt. is not a good  for history

## 2023-01-27 NOTE — PROGRESS NOTE ADULT - SUBJECTIVE AND OBJECTIVE BOX
68-year-old male past medical history of CAD status post stents diabetes hypertension hyperlipidemia coming in here for fevers chills and altered mental status    Today:  Seen at bedside, confused, AAO x 2, slow to answer.              REVIEW OF SYSTEMS:  Not a reliable historian      MEDICATIONS  (STANDING):  amLODIPine   Tablet 10 milliGRAM(s) Oral daily  atorvastatin 40 milliGRAM(s) Oral at bedtime  chlorhexidine 2% Cloths 1 Application(s) Topical <User Schedule>  dextrose 5%. 1000 milliLiter(s) (100 mL/Hr) IV Continuous <Continuous>  dextrose 5%. 1000 milliLiter(s) (50 mL/Hr) IV Continuous <Continuous>  dextrose 50% Injectable 25 Gram(s) IV Push once  dextrose 50% Injectable 12.5 Gram(s) IV Push once  dextrose 50% Injectable 25 Gram(s) IV Push once  enoxaparin Injectable 40 milliGRAM(s) SubCutaneous every 24 hours  furosemide    Tablet 40 milliGRAM(s) Oral daily  gabapentin 300 milliGRAM(s) Oral three times a day  glucagon  Injectable 1 milliGRAM(s) IntraMuscular once  insulin glargine Injectable (LANTUS) 16 Unit(s) SubCutaneous at bedtime  insulin lispro (ADMELOG) corrective regimen sliding scale   SubCutaneous three times a day before meals  insulin lispro (ADMELOG) corrective regimen sliding scale   SubCutaneous at bedtime  insulin lispro Injectable (ADMELOG) 8 Unit(s) SubCutaneous before breakfast  insulin lispro Injectable (ADMELOG) 8 Unit(s) SubCutaneous before lunch  insulin lispro Injectable (ADMELOG) 8 Unit(s) SubCutaneous before dinner  lisinopril 40 milliGRAM(s) Oral daily  metoprolol succinate ER 50 milliGRAM(s) Oral two times a day  pantoprazole    Tablet 40 milliGRAM(s) Oral before breakfast  PARoxetine 30 milliGRAM(s) Oral daily  sodium chloride 0.9%. 1000 milliLiter(s) (75 mL/Hr) IV Continuous <Continuous>    MEDICATIONS  (PRN):  acetaminophen     Tablet .. 650 milliGRAM(s) Oral every 6 hours PRN Temp greater or equal to 38C (100.4F), Mild Pain (1 - 3)  dextrose Oral Gel 15 Gram(s) Oral once PRN Blood Glucose LESS THAN 70 milliGRAM(s)/deciliter  ondansetron Injectable 4 milliGRAM(s) IV Push every 8 hours PRN Nausea and/or Vomiting      Allergies  No Known Allergies        FAMILY HISTORY:  No pertinent family history in first degree relatives        Vital Signs Last 24 Hrs  T(C): 36.6 (27 Jan 2023 05:28), Max: 37.9 (26 Jan 2023 21:38)  T(F): 97.9 (27 Jan 2023 05:28), Max: 100.3 (26 Jan 2023 21:38)  HR: 87 (27 Jan 2023 08:20) (51 - 120)  BP: 199/99 (27 Jan 2023 08:20) (129/88 - 199/99)  RR: 20 (27 Jan 2023 08:20) (18 - 22)  SpO2: 97% (27 Jan 2023 08:20) (94% - 98%)    Parameters below as of 27 Jan 2023 08:20  Patient On (Oxygen Delivery Method): room air        PHYSICAL EXAM:  GENERAL: NAD, well-groomed, well-developed, confused  HEAD:  Atraumatic, Normocephalic  EYES: EOMI, PERRLA, conjunctiva and sclera clear  ENMT: No tonsillar erythema, exudates, or enlargement; Moist mucous membranes, Good dentition, No lesions  NECK: Supple, No JVD, Normal thyroid  NERVOUS SYSTEM:  Alert & Oriented X2  CHEST/LUNG: CTA bilaterally; No rales, rhonchi, wheezing, or rubs  HEART: Regular rate and rhythm; No murmurs, rubs, or gallops  ABDOMEN: Soft, Nontender, Nondistended; Bowel sounds present  EXTREMITIES:  2+ Peripheral Pulses, No clubbing, cyanosis, or edema  SKIN: No rashes or lesions      LABS:                        12.1   3.94  )-----------( 171      ( 27 Jan 2023 05:50 )             35.5     01-26    137  |  96<L>  |  14  ----------------------------<  232<H>  3.9   |  25  |  1.2    Ca    9.9      26 Jan 2023 12:49    TPro  7.3  /  Alb  5.0  /  TBili  1.0  /  DBili  x   /  AST  19  /  ALT  16  /  AlkPhos  74  01-26    PT/INR - ( 26 Jan 2023 11:20 )   PT: 13.20 sec;   INR: 1.15 ratio         PTT - ( 26 Jan 2023 11:20 )  PTT:35.0 sec  Urinalysis Basic - ( 26 Jan 2023 12:20 )    Color: Yellow / Appearance: Clear / SG: >=1.030 / pH: x  Gluc: x / Ketone: 15  / Bili: Negative / Urobili: 0.2 mg/dL   Blood: x / Protein: >=300 mg/dL / Nitrite: Negative   Leuk Esterase: Negative / RBC: 6-10 /HPF / WBC 1-2 /HPF   Sq Epi: x / Non Sq Epi: Few /HPF / Bacteria: Few       68-year-old male past medical history of CAD status post stents diabetes hypertension hyperlipidemia coming in here for fevers chills and altered mental status    Today:  Seen at bedside, confused, AAO x 2, slow to answer.              REVIEW OF SYSTEMS:  Not a reliable historian      MEDICATIONS  (STANDING):  amLODIPine   Tablet 10 milliGRAM(s) Oral daily  atorvastatin 40 milliGRAM(s) Oral at bedtime  chlorhexidine 2% Cloths 1 Application(s) Topical <User Schedule>  dextrose 5%. 1000 milliLiter(s) (100 mL/Hr) IV Continuous <Continuous>  dextrose 5%. 1000 milliLiter(s) (50 mL/Hr) IV Continuous <Continuous>  dextrose 50% Injectable 25 Gram(s) IV Push once  dextrose 50% Injectable 12.5 Gram(s) IV Push once  dextrose 50% Injectable 25 Gram(s) IV Push once  enoxaparin Injectable 40 milliGRAM(s) SubCutaneous every 24 hours  furosemide    Tablet 40 milliGRAM(s) Oral daily  gabapentin 300 milliGRAM(s) Oral three times a day  glucagon  Injectable 1 milliGRAM(s) IntraMuscular once  insulin glargine Injectable (LANTUS) 16 Unit(s) SubCutaneous at bedtime  insulin lispro (ADMELOG) corrective regimen sliding scale   SubCutaneous three times a day before meals  insulin lispro (ADMELOG) corrective regimen sliding scale   SubCutaneous at bedtime  insulin lispro Injectable (ADMELOG) 8 Unit(s) SubCutaneous before breakfast  insulin lispro Injectable (ADMELOG) 8 Unit(s) SubCutaneous before lunch  insulin lispro Injectable (ADMELOG) 8 Unit(s) SubCutaneous before dinner  lisinopril 40 milliGRAM(s) Oral daily  metoprolol succinate ER 50 milliGRAM(s) Oral two times a day  pantoprazole    Tablet 40 milliGRAM(s) Oral before breakfast  PARoxetine 30 milliGRAM(s) Oral daily  sodium chloride 0.9%. 1000 milliLiter(s) (75 mL/Hr) IV Continuous <Continuous>    MEDICATIONS  (PRN):  acetaminophen     Tablet .. 650 milliGRAM(s) Oral every 6 hours PRN Temp greater or equal to 38C (100.4F), Mild Pain (1 - 3)  dextrose Oral Gel 15 Gram(s) Oral once PRN Blood Glucose LESS THAN 70 milliGRAM(s)/deciliter  ondansetron Injectable 4 milliGRAM(s) IV Push every 8 hours PRN Nausea and/or Vomiting      Allergies  No Known Allergies        FAMILY HISTORY:  No pertinent family history in first degree relatives        Vital Signs Last 24 Hrs  T(C): 36.6 (27 Jan 2023 05:28), Max: 37.9 (26 Jan 2023 21:38)  T(F): 97.9 (27 Jan 2023 05:28), Max: 100.3 (26 Jan 2023 21:38)  HR: 87 (27 Jan 2023 08:20) (51 - 120)  BP: 199/99 (27 Jan 2023 08:20) (129/88 - 199/99)  RR: 20 (27 Jan 2023 08:20) (18 - 22)  SpO2: 97% (27 Jan 2023 08:20) (94% - 98%)    Parameters below as of 27 Jan 2023 08:20  Patient On (Oxygen Delivery Method): room air        PHYSICAL EXAM:  GENERAL: NAD, well-groomed, well-developed, confused  HEAD:  Atraumatic, Normocephalic  EYES: EOMI, PERRLA, conjunctiva and sclera clear  ENMT: No tonsillar erythema, exudates, or enlargement; Moist mucous membranes, Good dentition, No lesions  NECK: Supple, No JVD, Normal thyroid  NERVOUS SYSTEM:  Alert & Oriented X2  CHEST/LUNG: CTA bilaterally; No rales, rhonchi, wheezing, or rubs  HEART: Regular rate and rhythm; No murmurs, rubs, or gallops  ABDOMEN: Soft, Nontender, Nondistended; Bowel sounds present  EXTREMITIES:  2+ Peripheral Pulses, No clubbing, cyanosis, or edema  : erythema of glans and shaft of penis, no discharge or skin breakdown, no lesions  SKIN: No rashes or lesions      LABS:                        12.1   3.94  )-----------( 171      ( 27 Jan 2023 05:50 )             35.5     01-26    137  |  96<L>  |  14  ----------------------------<  232<H>  3.9   |  25  |  1.2    Ca    9.9      26 Jan 2023 12:49    TPro  7.3  /  Alb  5.0  /  TBili  1.0  /  DBili  x   /  AST  19  /  ALT  16  /  AlkPhos  74  01-26    PT/INR - ( 26 Jan 2023 11:20 )   PT: 13.20 sec;   INR: 1.15 ratio         PTT - ( 26 Jan 2023 11:20 )  PTT:35.0 sec  Urinalysis Basic - ( 26 Jan 2023 12:20 )    Color: Yellow / Appearance: Clear / SG: >=1.030 / pH: x  Gluc: x / Ketone: 15  / Bili: Negative / Urobili: 0.2 mg/dL   Blood: x / Protein: >=300 mg/dL / Nitrite: Negative   Leuk Esterase: Negative / RBC: 6-10 /HPF / WBC 1-2 /HPF   Sq Epi: x / Non Sq Epi: Few /HPF / Bacteria: Few

## 2023-01-27 NOTE — PROGRESS NOTE ADULT - ASSESSMENT
68-year-old male past medical history of CAD status post stents diabetes hypertension hyperlipidemia coming in here for fevers chills and altered mental status    # AMS likely due to infection   # COVID +  - follow up bld / urine cx   - procal level  - inflam markers   -Will send RPR, TSH, B12  - c/w IVF   - if ams not improved then will consider neuro eval/ lumbar puncture     #Hypertension  -  - C/w amlodipine 10 mg PO daily  - C/w lisinopril 40 mg PO daily (on benazapril at home)    #CAD s/p stent 2008  #HLD  - C/w Toprol 50 mg PO BID -- dosing/frequency confirmed   - C/w Lipitor 40 mg PO qhs    #DM  # neuropathy   - will hold PO meds , start insulin coverage with SS and FS   - c/w sandrita     #Anxiety/depression  - C/w Paxil 30 mg PO daily    #DVT ppx: Lovenox 40 mg SC daily  #GI ppx: Protonix 40 mg PO daily  #Diet: CC/DASH/TLC  #Activity: IAT; PT consult  #Dispo: From home,  #Code status: DNR/DNI , family to bring in documentation     PMD : Dr Garcia   cards : dr ebach    68-year-old male past medical history of CAD status post stents diabetes hypertension hyperlipidemia coming in here for fevers chills and altered mental status    # AMS likely due to infection   # COVID +  - follow up bld / urine cx   - Start RDV  - procal level  - inflam markers   -Will send RPR, TSH, B12  - c/w IVF   - if ams not improved then will consider neuro eval/ lumbar puncture     #Penile Cellulitis:  -Started after failed Barnes attempt in ED (patient now does not need a Barnes)  -Start Rocephin and monitor for improvement     #Hypertension  -  - C/w amlodipine 10 mg PO daily  - C/w lisinopril 40 mg PO daily (on benazapril at home)    #CAD s/p stent 2008  #HLD  - C/w Toprol 50 mg PO BID -- dosing/frequency confirmed   - C/w Lipitor 40 mg PO qhs    #DM  # neuropathy   - will hold PO meds , start insulin coverage with SS and FS   - c/w sandrita     #Anxiety/depression  - C/w Paxil 30 mg PO daily    #DVT ppx: Lovenox 40 mg SC daily  #GI ppx: Protonix 40 mg PO daily  #Diet: CC/DASH/TLC  #Activity: IAT; PT consult  #Dispo: From home,  #Code status: DNR/DNI , family to bring in documentation     PMD : Dr Garcia   cards : dr beach

## 2023-01-27 NOTE — CONSULT NOTE ADULT - ASSESSMENT
ASSESSMENT  68-year-old male past medical history of CAD status post stents diabetes hypertension hyperlipidemia coming in here for fevers chills and altered mental status.      IMPRESSION  #COVID-19, non-severe  #Toxic Metabolic Encephalopathy  #CAD  #DM II    #Abx allergy: NKDA    RECOMMENDATIONS  - Mental status seems to be improved compared to yesterday   - can start , followed by 100 mg daily for 3 days for non-severe COVID   - as UA negative, stop ceftriaxone  - supportive care for now   - if continued improvement, does not have to complete full 3 day course of RDV     Please call or message on [employer] Teams if with any questions.  Spectra [id number]

## 2023-01-28 LAB
ALBUMIN SERPL ELPH-MCNC: 4.1 G/DL — SIGNIFICANT CHANGE UP (ref 3.5–5.2)
ALBUMIN SERPL ELPH-MCNC: 4.2 G/DL — SIGNIFICANT CHANGE UP (ref 3.5–5.2)
ALP SERPL-CCNC: 61 U/L — SIGNIFICANT CHANGE UP (ref 30–115)
ALP SERPL-CCNC: 61 U/L — SIGNIFICANT CHANGE UP (ref 30–115)
ALT FLD-CCNC: 16 U/L — SIGNIFICANT CHANGE UP (ref 0–41)
ALT FLD-CCNC: 16 U/L — SIGNIFICANT CHANGE UP (ref 0–41)
ANION GAP SERPL CALC-SCNC: 13 MMOL/L — SIGNIFICANT CHANGE UP (ref 7–14)
AST SERPL-CCNC: 21 U/L — SIGNIFICANT CHANGE UP (ref 0–41)
AST SERPL-CCNC: 21 U/L — SIGNIFICANT CHANGE UP (ref 0–41)
BASOPHILS # BLD AUTO: 0.03 K/UL — SIGNIFICANT CHANGE UP (ref 0–0.2)
BASOPHILS NFR BLD AUTO: 0.7 % — SIGNIFICANT CHANGE UP (ref 0–1)
BILIRUB DIRECT SERPL-MCNC: <0.2 MG/DL — SIGNIFICANT CHANGE UP (ref 0–0.3)
BILIRUB INDIRECT FLD-MCNC: >0.4 MG/DL — SIGNIFICANT CHANGE UP (ref 0.2–1.2)
BILIRUB SERPL-MCNC: 0.6 MG/DL — SIGNIFICANT CHANGE UP (ref 0.2–1.2)
BILIRUB SERPL-MCNC: 0.6 MG/DL — SIGNIFICANT CHANGE UP (ref 0.2–1.2)
BUN SERPL-MCNC: 19 MG/DL — SIGNIFICANT CHANGE UP (ref 10–20)
BURR CELLS BLD QL SMEAR: SLIGHT — SIGNIFICANT CHANGE UP
CALCIUM SERPL-MCNC: 9 MG/DL — SIGNIFICANT CHANGE UP (ref 8.4–10.5)
CHLORIDE SERPL-SCNC: 102 MMOL/L — SIGNIFICANT CHANGE UP (ref 98–110)
CO2 SERPL-SCNC: 27 MMOL/L — SIGNIFICANT CHANGE UP (ref 17–32)
CREAT SERPL-MCNC: 1.2 MG/DL — SIGNIFICANT CHANGE UP (ref 0.7–1.5)
CREAT SERPL-MCNC: 1.3 MG/DL — SIGNIFICANT CHANGE UP (ref 0.7–1.5)
CRP SERPL-MCNC: 42 MG/L — HIGH
EGFR: 60 ML/MIN/1.73M2 — SIGNIFICANT CHANGE UP
EGFR: 66 ML/MIN/1.73M2 — SIGNIFICANT CHANGE UP
EOSINOPHIL # BLD AUTO: 0.08 K/UL — SIGNIFICANT CHANGE UP (ref 0–0.7)
EOSINOPHIL NFR BLD AUTO: 1.8 % — SIGNIFICANT CHANGE UP (ref 0–8)
GLUCOSE BLDC GLUCOMTR-MCNC: 125 MG/DL — HIGH (ref 70–99)
GLUCOSE BLDC GLUCOMTR-MCNC: 150 MG/DL — HIGH (ref 70–99)
GLUCOSE BLDC GLUCOMTR-MCNC: 174 MG/DL — HIGH (ref 70–99)
GLUCOSE BLDC GLUCOMTR-MCNC: 251 MG/DL — HIGH (ref 70–99)
GLUCOSE SERPL-MCNC: 123 MG/DL — HIGH (ref 70–99)
HCT VFR BLD CALC: 38.1 % — LOW (ref 42–52)
HGB BLD-MCNC: 12.6 G/DL — LOW (ref 14–18)
IMM GRANULOCYTES NFR BLD AUTO: 0.2 % — SIGNIFICANT CHANGE UP (ref 0.1–0.3)
INR BLD: 1.16 RATIO — SIGNIFICANT CHANGE UP (ref 0.65–1.3)
LYMPHOCYTES # BLD AUTO: 1.81 K/UL — SIGNIFICANT CHANGE UP (ref 1.2–3.4)
LYMPHOCYTES # BLD AUTO: 41.7 % — SIGNIFICANT CHANGE UP (ref 20.5–51.1)
MCHC RBC-ENTMCNC: 29.2 PG — SIGNIFICANT CHANGE UP (ref 27–31)
MCHC RBC-ENTMCNC: 33.1 G/DL — SIGNIFICANT CHANGE UP (ref 32–37)
MCV RBC AUTO: 88.2 FL — SIGNIFICANT CHANGE UP (ref 80–94)
MONOCYTES # BLD AUTO: 0.74 K/UL — HIGH (ref 0.1–0.6)
MONOCYTES NFR BLD AUTO: 17.1 % — HIGH (ref 1.7–9.3)
NEUTROPHILS # BLD AUTO: 1.67 K/UL — SIGNIFICANT CHANGE UP (ref 1.4–6.5)
NEUTROPHILS NFR BLD AUTO: 38.5 % — LOW (ref 42.2–75.2)
NRBC # BLD: 0 /100 WBCS — SIGNIFICANT CHANGE UP (ref 0–0)
NRBC # BLD: 0 /100 — SIGNIFICANT CHANGE UP (ref 0–0)
PLAT MORPH BLD: NORMAL — SIGNIFICANT CHANGE UP
PLATELET # BLD AUTO: 176 K/UL — SIGNIFICANT CHANGE UP (ref 130–400)
POIKILOCYTOSIS BLD QL AUTO: SLIGHT — SIGNIFICANT CHANGE UP
POTASSIUM SERPL-MCNC: 3.8 MMOL/L — SIGNIFICANT CHANGE UP (ref 3.5–5)
POTASSIUM SERPL-SCNC: 3.8 MMOL/L — SIGNIFICANT CHANGE UP (ref 3.5–5)
PROCALCITONIN SERPL-MCNC: 0.07 NG/ML — SIGNIFICANT CHANGE UP (ref 0.02–0.1)
PROT SERPL-MCNC: 6.1 G/DL — SIGNIFICANT CHANGE UP (ref 6–8)
PROT SERPL-MCNC: 6.2 G/DL — SIGNIFICANT CHANGE UP (ref 6–8)
PROTHROM AB SERPL-ACNC: 13.3 SEC — HIGH (ref 9.95–12.87)
RBC # BLD: 4.32 M/UL — LOW (ref 4.7–6.1)
RBC # FLD: 13.4 % — SIGNIFICANT CHANGE UP (ref 11.5–14.5)
RBC BLD AUTO: ABNORMAL
SODIUM SERPL-SCNC: 142 MMOL/L — SIGNIFICANT CHANGE UP (ref 135–146)
T PALLIDUM AB TITR SER: NEGATIVE — SIGNIFICANT CHANGE UP
WBC # BLD: 4.34 K/UL — LOW (ref 4.8–10.8)
WBC # FLD AUTO: 4.34 K/UL — LOW (ref 4.8–10.8)

## 2023-01-28 PROCEDURE — 99232 SBSQ HOSP IP/OBS MODERATE 35: CPT

## 2023-01-28 RX ADMIN — Medication 50 MILLIGRAM(S): at 17:14

## 2023-01-28 RX ADMIN — Medication 40 MILLIGRAM(S): at 05:58

## 2023-01-28 RX ADMIN — ATORVASTATIN CALCIUM 40 MILLIGRAM(S): 80 TABLET, FILM COATED ORAL at 21:02

## 2023-01-28 RX ADMIN — Medication 1: at 21:19

## 2023-01-28 RX ADMIN — LISINOPRIL 40 MILLIGRAM(S): 2.5 TABLET ORAL at 05:53

## 2023-01-28 RX ADMIN — Medication 8 UNIT(S): at 11:47

## 2023-01-28 RX ADMIN — Medication 2: at 16:24

## 2023-01-28 RX ADMIN — Medication 8 UNIT(S): at 08:06

## 2023-01-28 RX ADMIN — GABAPENTIN 300 MILLIGRAM(S): 400 CAPSULE ORAL at 05:53

## 2023-01-28 RX ADMIN — CEFTRIAXONE 100 MILLIGRAM(S): 500 INJECTION, POWDER, FOR SOLUTION INTRAMUSCULAR; INTRAVENOUS at 13:19

## 2023-01-28 RX ADMIN — Medication 50 MILLIGRAM(S): at 05:53

## 2023-01-28 RX ADMIN — INSULIN GLARGINE 16 UNIT(S): 100 INJECTION, SOLUTION SUBCUTANEOUS at 21:19

## 2023-01-28 RX ADMIN — GABAPENTIN 300 MILLIGRAM(S): 400 CAPSULE ORAL at 21:02

## 2023-01-28 RX ADMIN — AMLODIPINE BESYLATE 10 MILLIGRAM(S): 2.5 TABLET ORAL at 05:52

## 2023-01-28 RX ADMIN — Medication 30 MILLIGRAM(S): at 11:48

## 2023-01-28 RX ADMIN — Medication 8 UNIT(S): at 16:24

## 2023-01-28 RX ADMIN — REMDESIVIR 200 MILLIGRAM(S): 5 INJECTION INTRAVENOUS at 16:27

## 2023-01-28 RX ADMIN — PANTOPRAZOLE SODIUM 40 MILLIGRAM(S): 20 TABLET, DELAYED RELEASE ORAL at 06:22

## 2023-01-28 RX ADMIN — CHLORHEXIDINE GLUCONATE 1 APPLICATION(S): 213 SOLUTION TOPICAL at 05:53

## 2023-01-28 RX ADMIN — GABAPENTIN 300 MILLIGRAM(S): 400 CAPSULE ORAL at 13:18

## 2023-01-28 RX ADMIN — ENOXAPARIN SODIUM 40 MILLIGRAM(S): 100 INJECTION SUBCUTANEOUS at 17:14

## 2023-01-28 NOTE — PROGRESS NOTE ADULT - SUBJECTIVE AND OBJECTIVE BOX
68-year-old male past medical history of CAD status post stents diabetes hypertension hyperlipidemia coming in here for fevers chills and altered mental status    Today:  Seen at bedside, mental status vastly improved.  AAO x 3, talkative, energetic.            REVIEW OF SYSTEMS:  no new complaints      MEDICATIONS  (STANDING):  amLODIPine   Tablet 10 milliGRAM(s) Oral daily  atorvastatin 40 milliGRAM(s) Oral at bedtime  cefTRIAXone   IVPB 1000 milliGRAM(s) IV Intermittent every 24 hours  chlorhexidine 2% Cloths 1 Application(s) Topical <User Schedule>  dextrose 5%. 1000 milliLiter(s) (100 mL/Hr) IV Continuous <Continuous>  dextrose 5%. 1000 milliLiter(s) (50 mL/Hr) IV Continuous <Continuous>  dextrose 50% Injectable 25 Gram(s) IV Push once  dextrose 50% Injectable 12.5 Gram(s) IV Push once  dextrose 50% Injectable 25 Gram(s) IV Push once  enoxaparin Injectable 40 milliGRAM(s) SubCutaneous every 24 hours  furosemide    Tablet 40 milliGRAM(s) Oral daily  gabapentin 300 milliGRAM(s) Oral three times a day  glucagon  Injectable 1 milliGRAM(s) IntraMuscular once  insulin glargine Injectable (LANTUS) 16 Unit(s) SubCutaneous at bedtime  insulin lispro (ADMELOG) corrective regimen sliding scale   SubCutaneous three times a day before meals  insulin lispro (ADMELOG) corrective regimen sliding scale   SubCutaneous at bedtime  insulin lispro Injectable (ADMELOG) 8 Unit(s) SubCutaneous before breakfast  insulin lispro Injectable (ADMELOG) 8 Unit(s) SubCutaneous before lunch  insulin lispro Injectable (ADMELOG) 8 Unit(s) SubCutaneous before dinner  lisinopril 40 milliGRAM(s) Oral daily  metoprolol succinate ER 50 milliGRAM(s) Oral two times a day  pantoprazole    Tablet 40 milliGRAM(s) Oral before breakfast  PARoxetine 30 milliGRAM(s) Oral daily  remdesivir  IVPB   IV Intermittent   remdesivir  IVPB 100 milliGRAM(s) IV Intermittent every 24 hours  sodium chloride 0.9%. 1000 milliLiter(s) (75 mL/Hr) IV Continuous <Continuous>    MEDICATIONS  (PRN):  acetaminophen     Tablet .. 650 milliGRAM(s) Oral every 6 hours PRN Temp greater or equal to 38C (100.4F), Mild Pain (1 - 3)  dextrose Oral Gel 15 Gram(s) Oral once PRN Blood Glucose LESS THAN 70 milliGRAM(s)/deciliter  ondansetron Injectable 4 milliGRAM(s) IV Push every 8 hours PRN Nausea and/or Vomiting      Allergies  No Known Allergies        FAMILY HISTORY:  No pertinent family history in first degree relatives        Vital Signs Last 24 Hrs  T(C): 36.1 (28 Jan 2023 05:11), Max: 36.1 (28 Jan 2023 05:11)  T(F): 97 (28 Jan 2023 05:11), Max: 97 (28 Jan 2023 05:11)  HR: 73 (28 Jan 2023 05:11) (71 - 83)  BP: 164/86 (28 Jan 2023 05:11) (146/76 - 164/86)  RR: 16 (28 Jan 2023 05:11) (16 - 18)  SpO2: 96% (27 Jan 2023 20:38) (96% - 96%)        PHYSICAL EXAM:  GENERAL: NAD, well-groomed, well-developed, confused  HEAD:  Atraumatic, Normocephalic  EYES: EOMI, PERRLA, conjunctiva and sclera clear  ENMT: No tonsillar erythema, exudates, or enlargement; Moist mucous membranes, Good dentition, No lesions  NECK: Supple, No JVD, Normal thyroid  NERVOUS SYSTEM:  Alert & Oriented X3  CHEST/LUNG: CTA bilaterally; No rales, rhonchi, wheezing, or rubs  HEART: Regular rate and rhythm; No murmurs, rubs, or gallops  ABDOMEN: Soft, Nontender, Nondistended; Bowel sounds present  EXTREMITIES:  2+ Peripheral Pulses, No clubbing, cyanosis, or edema  : erythema of glans and shaft of penis, no discharge or skin breakdown, no lesions  SKIN: No rashes or lesions      LABS:                        12.6   4.34  )-----------( 176      ( 28 Jan 2023 08:30 )             38.1     01-28    142  |  102  |  19  ----------------------------<  123<H>  3.8   |  27  |  1.3    Ca    9.0      28 Jan 2023 08:30    TPro  6.2  /  Alb  4.2  /  TBili  0.6  /  DBili  <0.2  /  AST  21  /  ALT  16  /  AlkPhos  61  01-28    PT/INR - ( 28 Jan 2023 08:30 )   PT: 13.30 sec;   INR: 1.16 ratio

## 2023-01-28 NOTE — PROGRESS NOTE ADULT - ASSESSMENT
68-year-old male past medical history of CAD status post stents diabetes hypertension hyperlipidemia coming in here for fevers chills and altered mental status    # AMS likely due to infection   # COVID +  - bld / urine cx neg   - RDV  - procal normal   - inflam markers not markedly elevated   -RPR, TSH, B12 normal  - c/w IVF     #Penile Cellulitis:  -Started after failed Barnes attempt in ED (patient now does not need a Barnes)  -Start Rocephin and monitor for improvement     #Hypertension  -  - C/w amlodipine 10 mg PO daily  - C/w lisinopril 40 mg PO daily (on benazapril at home)    #CAD s/p stent 2008  #HLD  - C/w Toprol 50 mg PO BID  - C/w Lipitor 40 mg PO qhs    #DM  # neuropathy   - will hold PO meds , start insulin coverage with SS and FS   - c/w sandrita     #Anxiety/depression  - C/w Paxil 30 mg PO daily    #DVT ppx: Lovenox 40 mg SC daily  #GI ppx: Protonix 40 mg PO daily  #Diet: CC/DASH/TLC  #Activity: IAT; PT consult  #Dispo: From home,  #Code status: DNR/DNI     PMD : Dr Garcia   cards : dr beach

## 2023-01-29 LAB
ALBUMIN SERPL ELPH-MCNC: 4 G/DL — SIGNIFICANT CHANGE UP (ref 3.5–5.2)
ALBUMIN SERPL ELPH-MCNC: 4.1 G/DL — SIGNIFICANT CHANGE UP (ref 3.5–5.2)
ALP SERPL-CCNC: 60 U/L — SIGNIFICANT CHANGE UP (ref 30–115)
ALP SERPL-CCNC: 61 U/L — SIGNIFICANT CHANGE UP (ref 30–115)
ALT FLD-CCNC: 16 U/L — SIGNIFICANT CHANGE UP (ref 0–41)
ALT FLD-CCNC: 16 U/L — SIGNIFICANT CHANGE UP (ref 0–41)
ANION GAP SERPL CALC-SCNC: 13 MMOL/L — SIGNIFICANT CHANGE UP (ref 7–14)
AST SERPL-CCNC: 16 U/L — SIGNIFICANT CHANGE UP (ref 0–41)
AST SERPL-CCNC: 17 U/L — SIGNIFICANT CHANGE UP (ref 0–41)
BASOPHILS # BLD AUTO: 0.05 K/UL — SIGNIFICANT CHANGE UP (ref 0–0.2)
BASOPHILS NFR BLD AUTO: 1 % — SIGNIFICANT CHANGE UP (ref 0–1)
BILIRUB DIRECT SERPL-MCNC: <0.2 MG/DL — SIGNIFICANT CHANGE UP (ref 0–0.3)
BILIRUB INDIRECT FLD-MCNC: >0.2 MG/DL — SIGNIFICANT CHANGE UP (ref 0.2–1.2)
BILIRUB SERPL-MCNC: 0.4 MG/DL — SIGNIFICANT CHANGE UP (ref 0.2–1.2)
BILIRUB SERPL-MCNC: 0.4 MG/DL — SIGNIFICANT CHANGE UP (ref 0.2–1.2)
BUN SERPL-MCNC: 20 MG/DL — SIGNIFICANT CHANGE UP (ref 10–20)
CALCIUM SERPL-MCNC: 9.1 MG/DL — SIGNIFICANT CHANGE UP (ref 8.4–10.5)
CHLORIDE SERPL-SCNC: 102 MMOL/L — SIGNIFICANT CHANGE UP (ref 98–110)
CO2 SERPL-SCNC: 27 MMOL/L — SIGNIFICANT CHANGE UP (ref 17–32)
CREAT SERPL-MCNC: 1.2 MG/DL — SIGNIFICANT CHANGE UP (ref 0.7–1.5)
CREAT SERPL-MCNC: 1.2 MG/DL — SIGNIFICANT CHANGE UP (ref 0.7–1.5)
EGFR: 66 ML/MIN/1.73M2 — SIGNIFICANT CHANGE UP
EGFR: 66 ML/MIN/1.73M2 — SIGNIFICANT CHANGE UP
EOSINOPHIL # BLD AUTO: 0.25 K/UL — SIGNIFICANT CHANGE UP (ref 0–0.7)
EOSINOPHIL NFR BLD AUTO: 5.1 % — SIGNIFICANT CHANGE UP (ref 0–8)
GLUCOSE BLDC GLUCOMTR-MCNC: 139 MG/DL — HIGH (ref 70–99)
GLUCOSE BLDC GLUCOMTR-MCNC: 152 MG/DL — HIGH (ref 70–99)
GLUCOSE BLDC GLUCOMTR-MCNC: 167 MG/DL — HIGH (ref 70–99)
GLUCOSE BLDC GLUCOMTR-MCNC: 170 MG/DL — HIGH (ref 70–99)
GLUCOSE SERPL-MCNC: 158 MG/DL — HIGH (ref 70–99)
HCT VFR BLD CALC: 38.9 % — LOW (ref 42–52)
HGB BLD-MCNC: 12.9 G/DL — LOW (ref 14–18)
IMM GRANULOCYTES NFR BLD AUTO: 0.4 % — HIGH (ref 0.1–0.3)
INR BLD: 1.17 RATIO — SIGNIFICANT CHANGE UP (ref 0.65–1.3)
LYMPHOCYTES # BLD AUTO: 2.03 K/UL — SIGNIFICANT CHANGE UP (ref 1.2–3.4)
LYMPHOCYTES # BLD AUTO: 41.3 % — SIGNIFICANT CHANGE UP (ref 20.5–51.1)
MCHC RBC-ENTMCNC: 29.3 PG — SIGNIFICANT CHANGE UP (ref 27–31)
MCHC RBC-ENTMCNC: 33.2 G/DL — SIGNIFICANT CHANGE UP (ref 32–37)
MCV RBC AUTO: 88.2 FL — SIGNIFICANT CHANGE UP (ref 80–94)
MONOCYTES # BLD AUTO: 0.52 K/UL — SIGNIFICANT CHANGE UP (ref 0.1–0.6)
MONOCYTES NFR BLD AUTO: 10.6 % — HIGH (ref 1.7–9.3)
NEUTROPHILS # BLD AUTO: 2.04 K/UL — SIGNIFICANT CHANGE UP (ref 1.4–6.5)
NEUTROPHILS NFR BLD AUTO: 41.6 % — LOW (ref 42.2–75.2)
NRBC # BLD: 0 /100 WBCS — SIGNIFICANT CHANGE UP (ref 0–0)
PLATELET # BLD AUTO: 187 K/UL — SIGNIFICANT CHANGE UP (ref 130–400)
POTASSIUM SERPL-MCNC: 4.3 MMOL/L — SIGNIFICANT CHANGE UP (ref 3.5–5)
POTASSIUM SERPL-SCNC: 4.3 MMOL/L — SIGNIFICANT CHANGE UP (ref 3.5–5)
PROT SERPL-MCNC: 6.4 G/DL — SIGNIFICANT CHANGE UP (ref 6–8)
PROT SERPL-MCNC: 6.4 G/DL — SIGNIFICANT CHANGE UP (ref 6–8)
PROTHROM AB SERPL-ACNC: 13.4 SEC — HIGH (ref 9.95–12.87)
RBC # BLD: 4.41 M/UL — LOW (ref 4.7–6.1)
RBC # FLD: 13.2 % — SIGNIFICANT CHANGE UP (ref 11.5–14.5)
SODIUM SERPL-SCNC: 142 MMOL/L — SIGNIFICANT CHANGE UP (ref 135–146)
WBC # BLD: 4.91 K/UL — SIGNIFICANT CHANGE UP (ref 4.8–10.8)
WBC # FLD AUTO: 4.91 K/UL — SIGNIFICANT CHANGE UP (ref 4.8–10.8)

## 2023-01-29 PROCEDURE — 99232 SBSQ HOSP IP/OBS MODERATE 35: CPT

## 2023-01-29 RX ADMIN — ATORVASTATIN CALCIUM 40 MILLIGRAM(S): 80 TABLET, FILM COATED ORAL at 21:39

## 2023-01-29 RX ADMIN — Medication 8 UNIT(S): at 11:40

## 2023-01-29 RX ADMIN — Medication 30 MILLIGRAM(S): at 11:39

## 2023-01-29 RX ADMIN — Medication 8 UNIT(S): at 08:14

## 2023-01-29 RX ADMIN — GABAPENTIN 300 MILLIGRAM(S): 400 CAPSULE ORAL at 05:43

## 2023-01-29 RX ADMIN — Medication 2: at 08:14

## 2023-01-29 RX ADMIN — AMLODIPINE BESYLATE 10 MILLIGRAM(S): 2.5 TABLET ORAL at 05:43

## 2023-01-29 RX ADMIN — LISINOPRIL 40 MILLIGRAM(S): 2.5 TABLET ORAL at 05:43

## 2023-01-29 RX ADMIN — GABAPENTIN 300 MILLIGRAM(S): 400 CAPSULE ORAL at 13:26

## 2023-01-29 RX ADMIN — Medication 2: at 16:46

## 2023-01-29 RX ADMIN — CHLORHEXIDINE GLUCONATE 1 APPLICATION(S): 213 SOLUTION TOPICAL at 05:43

## 2023-01-29 RX ADMIN — Medication 2: at 11:39

## 2023-01-29 RX ADMIN — Medication 50 MILLIGRAM(S): at 05:42

## 2023-01-29 RX ADMIN — ENOXAPARIN SODIUM 40 MILLIGRAM(S): 100 INJECTION SUBCUTANEOUS at 17:54

## 2023-01-29 RX ADMIN — Medication 8 UNIT(S): at 16:46

## 2023-01-29 RX ADMIN — PANTOPRAZOLE SODIUM 40 MILLIGRAM(S): 20 TABLET, DELAYED RELEASE ORAL at 05:42

## 2023-01-29 RX ADMIN — INSULIN GLARGINE 16 UNIT(S): 100 INJECTION, SOLUTION SUBCUTANEOUS at 21:39

## 2023-01-29 RX ADMIN — GABAPENTIN 300 MILLIGRAM(S): 400 CAPSULE ORAL at 21:39

## 2023-01-29 RX ADMIN — Medication 50 MILLIGRAM(S): at 17:54

## 2023-01-29 RX ADMIN — REMDESIVIR 200 MILLIGRAM(S): 5 INJECTION INTRAVENOUS at 16:46

## 2023-01-29 RX ADMIN — Medication 40 MILLIGRAM(S): at 05:43

## 2023-01-29 RX ADMIN — CEFTRIAXONE 100 MILLIGRAM(S): 500 INJECTION, POWDER, FOR SOLUTION INTRAMUSCULAR; INTRAVENOUS at 13:26

## 2023-01-29 NOTE — PROGRESS NOTE ADULT - ASSESSMENT
68-year-old male past medical history of CAD status post stents diabetes hypertension hyperlipidemia coming in here for fevers chills and altered mental status    # AMS likely due to infection   # COVID +  - bld / urine cx neg   - RDV  - procal normal   - inflam markers not markedly elevated   -RPR, TSH, B12 normal  - c/w IVF     #Penile Cellulitis:  -Started after failed Barnes attempt in ED (patient now does not need a Barnes)  -continue Rocephin and monitor for improvement     #Hypertension  -  - C/w amlodipine 10 mg PO daily  - C/w lisinopril 40 mg PO daily (on benazapril at home)  -Lasix, metoprolol    #CAD s/p stent 2008  #HLD  - C/w Toprol 50 mg PO BID  - C/w Lipitor 40 mg PO qhs    #DM  # neuropathy   - will hold PO meds , start insulin coverage with SS and FS   - c/w sandrita     #Anxiety/depression  - C/w Paxil 30 mg PO daily    #DVT ppx: Lovenox 40 mg SC daily  #GI ppx: Protonix 40 mg PO daily  #Diet: CC/DASH/TLC  #Activity: IAT; PT consult  #Dispo: From home,  #Code status: DNR/DNI     PMD : Dr Garcia   cards : dr beach

## 2023-01-29 NOTE — PROGRESS NOTE ADULT - SUBJECTIVE AND OBJECTIVE BOX
68-year-old male past medical history of CAD status post stents diabetes hypertension hyperlipidemia coming in here for fevers chills and altered mental status    Today:  Seen at bedside, mental status continues to improve.          REVIEW OF SYSTEMS:  No new complaints      MEDICATIONS  (STANDING):  amLODIPine   Tablet 10 milliGRAM(s) Oral daily  atorvastatin 40 milliGRAM(s) Oral at bedtime  cefTRIAXone   IVPB 1000 milliGRAM(s) IV Intermittent every 24 hours  chlorhexidine 2% Cloths 1 Application(s) Topical <User Schedule>  dextrose 5%. 1000 milliLiter(s) (100 mL/Hr) IV Continuous <Continuous>  dextrose 5%. 1000 milliLiter(s) (50 mL/Hr) IV Continuous <Continuous>  dextrose 50% Injectable 25 Gram(s) IV Push once  dextrose 50% Injectable 12.5 Gram(s) IV Push once  dextrose 50% Injectable 25 Gram(s) IV Push once  enoxaparin Injectable 40 milliGRAM(s) SubCutaneous every 24 hours  furosemide    Tablet 40 milliGRAM(s) Oral daily  gabapentin 300 milliGRAM(s) Oral three times a day  glucagon  Injectable 1 milliGRAM(s) IntraMuscular once  insulin glargine Injectable (LANTUS) 16 Unit(s) SubCutaneous at bedtime  insulin lispro (ADMELOG) corrective regimen sliding scale   SubCutaneous three times a day before meals  insulin lispro (ADMELOG) corrective regimen sliding scale   SubCutaneous at bedtime  insulin lispro Injectable (ADMELOG) 8 Unit(s) SubCutaneous before breakfast  insulin lispro Injectable (ADMELOG) 8 Unit(s) SubCutaneous before lunch  insulin lispro Injectable (ADMELOG) 8 Unit(s) SubCutaneous before dinner  lisinopril 40 milliGRAM(s) Oral daily  metoprolol succinate ER 50 milliGRAM(s) Oral two times a day  pantoprazole    Tablet 40 milliGRAM(s) Oral before breakfast  PARoxetine 30 milliGRAM(s) Oral daily  remdesivir  IVPB   IV Intermittent   remdesivir  IVPB 100 milliGRAM(s) IV Intermittent every 24 hours  sodium chloride 0.9%. 1000 milliLiter(s) (75 mL/Hr) IV Continuous <Continuous>    MEDICATIONS  (PRN):  acetaminophen     Tablet .. 650 milliGRAM(s) Oral every 6 hours PRN Temp greater or equal to 38C (100.4F), Mild Pain (1 - 3)  dextrose Oral Gel 15 Gram(s) Oral once PRN Blood Glucose LESS THAN 70 milliGRAM(s)/deciliter  ondansetron Injectable 4 milliGRAM(s) IV Push every 8 hours PRN Nausea and/or Vomiting      Allergies  No Known Allergies        FAMILY HISTORY:  No pertinent family history in first degree relatives        Vital Signs Last 24 Hrs  T(C): 36 (29 Jan 2023 05:00), Max: 36 (29 Jan 2023 05:00)  T(F): 96.8 (29 Jan 2023 05:00), Max: 96.8 (29 Jan 2023 05:00)  HR: 95 (29 Jan 2023 05:00) (63 - 95)  BP: 166/89 (29 Jan 2023 05:00) (162/82 - 178/83)  RR: 16 (29 Jan 2023 05:00) (16 - 16)  SpO2: 94% (28 Jan 2023 20:38) (94% - 94%)        PHYSICAL EXAM:  GENERAL: NAD, well-groomed, well-developed, confused  HEAD:  Atraumatic, Normocephalic  EYES: EOMI, PERRLA, conjunctiva and sclera clear  ENMT: No tonsillar erythema, exudates, or enlargement; Moist mucous membranes, Good dentition, No lesions  NECK: Supple, No JVD, Normal thyroid  NERVOUS SYSTEM:  Alert & Oriented X3  CHEST/LUNG: CTA bilaterally; No rales, rhonchi, wheezing, or rubs  HEART: Regular rate and rhythm; No murmurs, rubs, or gallops  ABDOMEN: Soft, Nontender, Nondistended; Bowel sounds present  EXTREMITIES:  2+ Peripheral Pulses, No clubbing, cyanosis, or edema  : erythema of glans and shaft of penis, no discharge or skin breakdown, no lesions  SKIN: No rashes or lesions        LABS:                        12.6   4.34  )-----------( 176      ( 28 Jan 2023 08:30 )             38.1     01-28    142  |  102  |  19  ----------------------------<  123<H>  3.8   |  27  |  1.3    Ca    9.0      28 Jan 2023 08:30    TPro  6.2  /  Alb  4.2  /  TBili  0.6  /  DBili  <0.2  /  AST  21  /  ALT  16  /  AlkPhos  61  01-28    PT/INR - ( 28 Jan 2023 08:30 )   PT: 13.30 sec;   INR: 1.16 ratio

## 2023-01-30 LAB
ALBUMIN SERPL ELPH-MCNC: 3.8 G/DL — SIGNIFICANT CHANGE UP (ref 3.5–5.2)
ALBUMIN SERPL ELPH-MCNC: 3.9 G/DL — SIGNIFICANT CHANGE UP (ref 3.5–5.2)
ALP SERPL-CCNC: 63 U/L — SIGNIFICANT CHANGE UP (ref 30–115)
ALP SERPL-CCNC: 63 U/L — SIGNIFICANT CHANGE UP (ref 30–115)
ALT FLD-CCNC: 14 U/L — SIGNIFICANT CHANGE UP (ref 0–41)
ALT FLD-CCNC: 14 U/L — SIGNIFICANT CHANGE UP (ref 0–41)
ANION GAP SERPL CALC-SCNC: 10 MMOL/L — SIGNIFICANT CHANGE UP (ref 7–14)
AST SERPL-CCNC: 18 U/L — SIGNIFICANT CHANGE UP (ref 0–41)
AST SERPL-CCNC: 19 U/L — SIGNIFICANT CHANGE UP (ref 0–41)
BASOPHILS # BLD AUTO: 0.02 K/UL — SIGNIFICANT CHANGE UP (ref 0–0.2)
BASOPHILS NFR BLD AUTO: 0.4 % — SIGNIFICANT CHANGE UP (ref 0–1)
BILIRUB DIRECT SERPL-MCNC: <0.2 MG/DL — SIGNIFICANT CHANGE UP (ref 0–0.3)
BILIRUB INDIRECT FLD-MCNC: >0.3 MG/DL — SIGNIFICANT CHANGE UP (ref 0.2–1.2)
BILIRUB SERPL-MCNC: 0.5 MG/DL — SIGNIFICANT CHANGE UP (ref 0.2–1.2)
BILIRUB SERPL-MCNC: 0.5 MG/DL — SIGNIFICANT CHANGE UP (ref 0.2–1.2)
BUN SERPL-MCNC: 17 MG/DL — SIGNIFICANT CHANGE UP (ref 10–20)
CALCIUM SERPL-MCNC: 8.7 MG/DL — SIGNIFICANT CHANGE UP (ref 8.4–10.5)
CHLORIDE SERPL-SCNC: 104 MMOL/L — SIGNIFICANT CHANGE UP (ref 98–110)
CO2 SERPL-SCNC: 27 MMOL/L — SIGNIFICANT CHANGE UP (ref 17–32)
CREAT SERPL-MCNC: 1.1 MG/DL — SIGNIFICANT CHANGE UP (ref 0.7–1.5)
CREAT SERPL-MCNC: 1.1 MG/DL — SIGNIFICANT CHANGE UP (ref 0.7–1.5)
EGFR: 74 ML/MIN/1.73M2 — SIGNIFICANT CHANGE UP
EGFR: 74 ML/MIN/1.73M2 — SIGNIFICANT CHANGE UP
EOSINOPHIL # BLD AUTO: 0.27 K/UL — SIGNIFICANT CHANGE UP (ref 0–0.7)
EOSINOPHIL NFR BLD AUTO: 4.8 % — SIGNIFICANT CHANGE UP (ref 0–8)
GLUCOSE BLDC GLUCOMTR-MCNC: 124 MG/DL — HIGH (ref 70–99)
GLUCOSE BLDC GLUCOMTR-MCNC: 199 MG/DL — HIGH (ref 70–99)
GLUCOSE BLDC GLUCOMTR-MCNC: 220 MG/DL — HIGH (ref 70–99)
GLUCOSE BLDC GLUCOMTR-MCNC: 222 MG/DL — HIGH (ref 70–99)
GLUCOSE SERPL-MCNC: 123 MG/DL — HIGH (ref 70–99)
HCT VFR BLD CALC: 37.3 % — LOW (ref 42–52)
HGB BLD-MCNC: 12.3 G/DL — LOW (ref 14–18)
IMM GRANULOCYTES NFR BLD AUTO: 0.5 % — HIGH (ref 0.1–0.3)
INR BLD: 1.25 RATIO — SIGNIFICANT CHANGE UP (ref 0.65–1.3)
LYMPHOCYTES # BLD AUTO: 2.07 K/UL — SIGNIFICANT CHANGE UP (ref 1.2–3.4)
LYMPHOCYTES # BLD AUTO: 37 % — SIGNIFICANT CHANGE UP (ref 20.5–51.1)
MCHC RBC-ENTMCNC: 28.9 PG — SIGNIFICANT CHANGE UP (ref 27–31)
MCHC RBC-ENTMCNC: 33 G/DL — SIGNIFICANT CHANGE UP (ref 32–37)
MCV RBC AUTO: 87.6 FL — SIGNIFICANT CHANGE UP (ref 80–94)
MONOCYTES # BLD AUTO: 0.52 K/UL — SIGNIFICANT CHANGE UP (ref 0.1–0.6)
MONOCYTES NFR BLD AUTO: 9.3 % — SIGNIFICANT CHANGE UP (ref 1.7–9.3)
NEUTROPHILS # BLD AUTO: 2.68 K/UL — SIGNIFICANT CHANGE UP (ref 1.4–6.5)
NEUTROPHILS NFR BLD AUTO: 48 % — SIGNIFICANT CHANGE UP (ref 42.2–75.2)
NRBC # BLD: 0 /100 WBCS — SIGNIFICANT CHANGE UP (ref 0–0)
PLATELET # BLD AUTO: 181 K/UL — SIGNIFICANT CHANGE UP (ref 130–400)
POTASSIUM SERPL-MCNC: 4.1 MMOL/L — SIGNIFICANT CHANGE UP (ref 3.5–5)
POTASSIUM SERPL-SCNC: 4.1 MMOL/L — SIGNIFICANT CHANGE UP (ref 3.5–5)
PROT SERPL-MCNC: 5.9 G/DL — LOW (ref 6–8)
PROT SERPL-MCNC: 6 G/DL — SIGNIFICANT CHANGE UP (ref 6–8)
PROTHROM AB SERPL-ACNC: 14.3 SEC — HIGH (ref 9.95–12.87)
RBC # BLD: 4.26 M/UL — LOW (ref 4.7–6.1)
RBC # FLD: 13.2 % — SIGNIFICANT CHANGE UP (ref 11.5–14.5)
SODIUM SERPL-SCNC: 141 MMOL/L — SIGNIFICANT CHANGE UP (ref 135–146)
WBC # BLD: 5.59 K/UL — SIGNIFICANT CHANGE UP (ref 4.8–10.8)
WBC # FLD AUTO: 5.59 K/UL — SIGNIFICANT CHANGE UP (ref 4.8–10.8)

## 2023-01-30 PROCEDURE — 99232 SBSQ HOSP IP/OBS MODERATE 35: CPT

## 2023-01-30 RX ADMIN — AMLODIPINE BESYLATE 10 MILLIGRAM(S): 2.5 TABLET ORAL at 05:26

## 2023-01-30 RX ADMIN — ATORVASTATIN CALCIUM 40 MILLIGRAM(S): 80 TABLET, FILM COATED ORAL at 22:16

## 2023-01-30 RX ADMIN — GABAPENTIN 300 MILLIGRAM(S): 400 CAPSULE ORAL at 22:18

## 2023-01-30 RX ADMIN — GABAPENTIN 300 MILLIGRAM(S): 400 CAPSULE ORAL at 05:26

## 2023-01-30 RX ADMIN — GABAPENTIN 300 MILLIGRAM(S): 400 CAPSULE ORAL at 15:34

## 2023-01-30 RX ADMIN — PANTOPRAZOLE SODIUM 40 MILLIGRAM(S): 20 TABLET, DELAYED RELEASE ORAL at 05:26

## 2023-01-30 RX ADMIN — ENOXAPARIN SODIUM 40 MILLIGRAM(S): 100 INJECTION SUBCUTANEOUS at 16:57

## 2023-01-30 RX ADMIN — Medication 30 MILLIGRAM(S): at 11:32

## 2023-01-30 RX ADMIN — LISINOPRIL 40 MILLIGRAM(S): 2.5 TABLET ORAL at 05:26

## 2023-01-30 RX ADMIN — REMDESIVIR 200 MILLIGRAM(S): 5 INJECTION INTRAVENOUS at 16:59

## 2023-01-30 RX ADMIN — SODIUM CHLORIDE 75 MILLILITER(S): 9 INJECTION INTRAMUSCULAR; INTRAVENOUS; SUBCUTANEOUS at 11:32

## 2023-01-30 RX ADMIN — Medication 8 UNIT(S): at 16:56

## 2023-01-30 RX ADMIN — Medication 4: at 12:06

## 2023-01-30 RX ADMIN — Medication 8 UNIT(S): at 12:06

## 2023-01-30 RX ADMIN — INSULIN GLARGINE 16 UNIT(S): 100 INJECTION, SOLUTION SUBCUTANEOUS at 22:20

## 2023-01-30 RX ADMIN — Medication 50 MILLIGRAM(S): at 17:20

## 2023-01-30 RX ADMIN — Medication 2: at 16:55

## 2023-01-30 RX ADMIN — Medication 40 MILLIGRAM(S): at 05:28

## 2023-01-30 RX ADMIN — CEFTRIAXONE 100 MILLIGRAM(S): 500 INJECTION, POWDER, FOR SOLUTION INTRAMUSCULAR; INTRAVENOUS at 12:04

## 2023-01-30 RX ADMIN — Medication 50 MILLIGRAM(S): at 05:26

## 2023-01-30 NOTE — PROGRESS NOTE ADULT - ASSESSMENT
68-year-old male past medical history of CAD status post stents diabetes hypertension hyperlipidemia coming in here for fevers chills and altered mental status    # AMS likely due to infection   # COVID +  - bld / urine cx neg   - RDV  - procal normal   - inflam markers not markedly elevated   -RPR, TSH, B12 normal    #Penile Cellulitis:  -Started after failed Barnes attempt in ED (patient now does not need a Barnes)  -continue Rocephin to complete 5 days    #Hypertension  -  - C/w amlodipine 10 mg PO daily  - C/w lisinopril 40 mg PO daily (on benazapril at home)  -Lasix, metoprolol    #CAD s/p stent 2008  #HLD  - C/w Toprol 50 mg PO BID  - C/w Lipitor 40 mg PO qhs    #DM  # neuropathy   - will hold PO meds , start insulin coverage with SS and FS   - c/w sandrita     #Anxiety/depression  - C/w Paxil 30 mg PO daily    #DVT ppx: Lovenox 40 mg SC daily  #GI ppx: Protonix 40 mg PO daily  #Diet: CC/DASH/TLC  #Activity: IAT; PT consult  #Dispo: From home,  #Code status: DNR/DNI     PMD : Dr Garcia   cards : dr yong FARAH ready to STR

## 2023-01-30 NOTE — PROGRESS NOTE ADULT - SUBJECTIVE AND OBJECTIVE BOX
68-year-old male past medical history of CAD status post stents diabetes hypertension hyperlipidemia coming in here for fevers chills and altered mental status    Today:  Seen at bedside, no new complaints.              REVIEW OF SYSTEMS:  No new complaints      MEDICATIONS  (STANDING):  amLODIPine   Tablet 10 milliGRAM(s) Oral daily  atorvastatin 40 milliGRAM(s) Oral at bedtime  cefTRIAXone   IVPB 1000 milliGRAM(s) IV Intermittent every 24 hours  chlorhexidine 2% Cloths 1 Application(s) Topical <User Schedule>  dextrose 5%. 1000 milliLiter(s) (100 mL/Hr) IV Continuous <Continuous>  dextrose 5%. 1000 milliLiter(s) (50 mL/Hr) IV Continuous <Continuous>  dextrose 50% Injectable 25 Gram(s) IV Push once  dextrose 50% Injectable 12.5 Gram(s) IV Push once  dextrose 50% Injectable 25 Gram(s) IV Push once  enoxaparin Injectable 40 milliGRAM(s) SubCutaneous every 24 hours  furosemide    Tablet 40 milliGRAM(s) Oral daily  gabapentin 300 milliGRAM(s) Oral three times a day  glucagon  Injectable 1 milliGRAM(s) IntraMuscular once  insulin glargine Injectable (LANTUS) 16 Unit(s) SubCutaneous at bedtime  insulin lispro (ADMELOG) corrective regimen sliding scale   SubCutaneous three times a day before meals  insulin lispro (ADMELOG) corrective regimen sliding scale   SubCutaneous at bedtime  insulin lispro Injectable (ADMELOG) 8 Unit(s) SubCutaneous before breakfast  insulin lispro Injectable (ADMELOG) 8 Unit(s) SubCutaneous before lunch  insulin lispro Injectable (ADMELOG) 8 Unit(s) SubCutaneous before dinner  lisinopril 40 milliGRAM(s) Oral daily  metoprolol succinate ER 50 milliGRAM(s) Oral two times a day  pantoprazole    Tablet 40 milliGRAM(s) Oral before breakfast  PARoxetine 30 milliGRAM(s) Oral daily  remdesivir  IVPB   IV Intermittent   remdesivir  IVPB 100 milliGRAM(s) IV Intermittent every 24 hours  sodium chloride 0.9%. 1000 milliLiter(s) (75 mL/Hr) IV Continuous <Continuous>    MEDICATIONS  (PRN):  acetaminophen     Tablet .. 650 milliGRAM(s) Oral every 6 hours PRN Temp greater or equal to 38C (100.4F), Mild Pain (1 - 3)  dextrose Oral Gel 15 Gram(s) Oral once PRN Blood Glucose LESS THAN 70 milliGRAM(s)/deciliter  ondansetron Injectable 4 milliGRAM(s) IV Push every 8 hours PRN Nausea and/or Vomiting      Allergies  No Known Allergies        FAMILY HISTORY:  No pertinent family history in first degree relatives        Vital Signs Last 24 Hrs  T(C): 36.8 (30 Jan 2023 04:32), Max: 36.8 (30 Jan 2023 04:32)  T(F): 98.3 (30 Jan 2023 04:32), Max: 98.3 (30 Jan 2023 04:32)  HR: 67 (30 Jan 2023 04:32) (62 - 70)  BP: 164/95 (30 Jan 2023 04:32) (154/89 - 164/95)  RR: 16 (29 Jan 2023 20:36) (16 - 16)  SpO2: 96% (30 Jan 2023 04:32) (96% - 96%)    Parameters below as of 30 Jan 2023 04:32  Patient On (Oxygen Delivery Method): room air        PHYSICAL EXAM:  GENERAL: NAD, well-groomed, well-developed, confused  HEAD:  Atraumatic, Normocephalic  EYES: EOMI, PERRLA, conjunctiva and sclera clear  ENMT: No tonsillar erythema, exudates, or enlargement; Moist mucous membranes, Good dentition, No lesions  NECK: Supple, No JVD, Normal thyroid  NERVOUS SYSTEM:  Alert & Oriented X3  CHEST/LUNG: CTA bilaterally; No rales, rhonchi, wheezing, or rubs  HEART: Regular rate and rhythm; No murmurs, rubs, or gallops  ABDOMEN: Soft, Nontender, Nondistended; Bowel sounds present  EXTREMITIES:  2+ Peripheral Pulses, No clubbing, cyanosis, or edema  : erythema of glans and shaft of penis, no discharge or skin breakdown, no lesions  SKIN: No rashes or lesions      LABS:                        12.3   5.59  )-----------( 181      ( 30 Jan 2023 06:36 )             37.3     01-30    x   |  x   |  x   ----------------------------<  x   x    |  x   |  1.1    Ca    8.7      30 Jan 2023 06:36    TPro  6.0  /  Alb  3.9  /  TBili  0.5  /  DBili  <0.2  /  AST  19  /  ALT  14  /  AlkPhos  63  01-30    PT/INR - ( 30 Jan 2023 06:38 )   PT: 14.30 sec;   INR: 1.25 ratio

## 2023-01-31 LAB
ALBUMIN SERPL ELPH-MCNC: 4 G/DL — SIGNIFICANT CHANGE UP (ref 3.5–5.2)
ALBUMIN SERPL ELPH-MCNC: 4 G/DL — SIGNIFICANT CHANGE UP (ref 3.5–5.2)
ALP SERPL-CCNC: 66 U/L — SIGNIFICANT CHANGE UP (ref 30–115)
ALP SERPL-CCNC: 67 U/L — SIGNIFICANT CHANGE UP (ref 30–115)
ALT FLD-CCNC: 14 U/L — SIGNIFICANT CHANGE UP (ref 0–41)
ALT FLD-CCNC: 14 U/L — SIGNIFICANT CHANGE UP (ref 0–41)
ANION GAP SERPL CALC-SCNC: 11 MMOL/L — SIGNIFICANT CHANGE UP (ref 7–14)
AST SERPL-CCNC: 16 U/L — SIGNIFICANT CHANGE UP (ref 0–41)
AST SERPL-CCNC: 16 U/L — SIGNIFICANT CHANGE UP (ref 0–41)
BASOPHILS # BLD AUTO: 0.03 K/UL — SIGNIFICANT CHANGE UP (ref 0–0.2)
BASOPHILS NFR BLD AUTO: 0.5 % — SIGNIFICANT CHANGE UP (ref 0–1)
BILIRUB DIRECT SERPL-MCNC: <0.2 MG/DL — SIGNIFICANT CHANGE UP (ref 0–0.3)
BILIRUB INDIRECT FLD-MCNC: >0.2 MG/DL — SIGNIFICANT CHANGE UP (ref 0.2–1.2)
BILIRUB SERPL-MCNC: 0.4 MG/DL — SIGNIFICANT CHANGE UP (ref 0.2–1.2)
BILIRUB SERPL-MCNC: 0.5 MG/DL — SIGNIFICANT CHANGE UP (ref 0.2–1.2)
BUN SERPL-MCNC: 17 MG/DL — SIGNIFICANT CHANGE UP (ref 10–20)
CALCIUM SERPL-MCNC: 8.9 MG/DL — SIGNIFICANT CHANGE UP (ref 8.4–10.5)
CHLORIDE SERPL-SCNC: 105 MMOL/L — SIGNIFICANT CHANGE UP (ref 98–110)
CO2 SERPL-SCNC: 26 MMOL/L — SIGNIFICANT CHANGE UP (ref 17–32)
CREAT SERPL-MCNC: 1 MG/DL — SIGNIFICANT CHANGE UP (ref 0.7–1.5)
CREAT SERPL-MCNC: 1 MG/DL — SIGNIFICANT CHANGE UP (ref 0.7–1.5)
CULTURE RESULTS: SIGNIFICANT CHANGE UP
CULTURE RESULTS: SIGNIFICANT CHANGE UP
EGFR: 82 ML/MIN/1.73M2 — SIGNIFICANT CHANGE UP
EGFR: 82 ML/MIN/1.73M2 — SIGNIFICANT CHANGE UP
EOSINOPHIL # BLD AUTO: 0.33 K/UL — SIGNIFICANT CHANGE UP (ref 0–0.7)
EOSINOPHIL NFR BLD AUTO: 5.3 % — SIGNIFICANT CHANGE UP (ref 0–8)
GLUCOSE BLDC GLUCOMTR-MCNC: 126 MG/DL — HIGH (ref 70–99)
GLUCOSE BLDC GLUCOMTR-MCNC: 131 MG/DL — HIGH (ref 70–99)
GLUCOSE BLDC GLUCOMTR-MCNC: 198 MG/DL — HIGH (ref 70–99)
GLUCOSE BLDC GLUCOMTR-MCNC: 250 MG/DL — HIGH (ref 70–99)
GLUCOSE SERPL-MCNC: 138 MG/DL — HIGH (ref 70–99)
HCT VFR BLD CALC: 36.2 % — LOW (ref 42–52)
HGB BLD-MCNC: 12.4 G/DL — LOW (ref 14–18)
IMM GRANULOCYTES NFR BLD AUTO: 0.6 % — HIGH (ref 0.1–0.3)
INR BLD: 1.3 RATIO — SIGNIFICANT CHANGE UP (ref 0.65–1.3)
LYMPHOCYTES # BLD AUTO: 2.32 K/UL — SIGNIFICANT CHANGE UP (ref 1.2–3.4)
LYMPHOCYTES # BLD AUTO: 37.4 % — SIGNIFICANT CHANGE UP (ref 20.5–51.1)
MCHC RBC-ENTMCNC: 29.5 PG — SIGNIFICANT CHANGE UP (ref 27–31)
MCHC RBC-ENTMCNC: 34.3 G/DL — SIGNIFICANT CHANGE UP (ref 32–37)
MCV RBC AUTO: 86 FL — SIGNIFICANT CHANGE UP (ref 80–94)
MONOCYTES # BLD AUTO: 0.62 K/UL — HIGH (ref 0.1–0.6)
MONOCYTES NFR BLD AUTO: 10 % — HIGH (ref 1.7–9.3)
NEUTROPHILS # BLD AUTO: 2.86 K/UL — SIGNIFICANT CHANGE UP (ref 1.4–6.5)
NEUTROPHILS NFR BLD AUTO: 46.2 % — SIGNIFICANT CHANGE UP (ref 42.2–75.2)
NRBC # BLD: 0 /100 WBCS — SIGNIFICANT CHANGE UP (ref 0–0)
PLATELET # BLD AUTO: 206 K/UL — SIGNIFICANT CHANGE UP (ref 130–400)
POTASSIUM SERPL-MCNC: 3.4 MMOL/L — LOW (ref 3.5–5)
POTASSIUM SERPL-SCNC: 3.4 MMOL/L — LOW (ref 3.5–5)
PROT SERPL-MCNC: 6.3 G/DL — SIGNIFICANT CHANGE UP (ref 6–8)
PROT SERPL-MCNC: 6.3 G/DL — SIGNIFICANT CHANGE UP (ref 6–8)
PROTHROM AB SERPL-ACNC: 14.9 SEC — HIGH (ref 9.95–12.87)
RBC # BLD: 4.21 M/UL — LOW (ref 4.7–6.1)
RBC # FLD: 13.1 % — SIGNIFICANT CHANGE UP (ref 11.5–14.5)
SODIUM SERPL-SCNC: 142 MMOL/L — SIGNIFICANT CHANGE UP (ref 135–146)
SPECIMEN SOURCE: SIGNIFICANT CHANGE UP
SPECIMEN SOURCE: SIGNIFICANT CHANGE UP
WBC # BLD: 6.2 K/UL — SIGNIFICANT CHANGE UP (ref 4.8–10.8)
WBC # FLD AUTO: 6.2 K/UL — SIGNIFICANT CHANGE UP (ref 4.8–10.8)

## 2023-01-31 PROCEDURE — 99232 SBSQ HOSP IP/OBS MODERATE 35: CPT

## 2023-01-31 RX ORDER — POTASSIUM CHLORIDE 20 MEQ
20 PACKET (EA) ORAL EVERY 4 HOURS
Refills: 0 | Status: COMPLETED | OUTPATIENT
Start: 2023-01-31 | End: 2023-01-31

## 2023-01-31 RX ADMIN — GABAPENTIN 300 MILLIGRAM(S): 400 CAPSULE ORAL at 13:54

## 2023-01-31 RX ADMIN — Medication 30 MILLIGRAM(S): at 12:26

## 2023-01-31 RX ADMIN — Medication 50 MILLIGRAM(S): at 07:10

## 2023-01-31 RX ADMIN — Medication 8 UNIT(S): at 16:33

## 2023-01-31 RX ADMIN — CHLORHEXIDINE GLUCONATE 1 APPLICATION(S): 213 SOLUTION TOPICAL at 07:12

## 2023-01-31 RX ADMIN — Medication 20 MILLIEQUIVALENT(S): at 12:25

## 2023-01-31 RX ADMIN — GABAPENTIN 300 MILLIGRAM(S): 400 CAPSULE ORAL at 07:10

## 2023-01-31 RX ADMIN — Medication 8 UNIT(S): at 12:30

## 2023-01-31 RX ADMIN — GABAPENTIN 300 MILLIGRAM(S): 400 CAPSULE ORAL at 21:19

## 2023-01-31 RX ADMIN — AMLODIPINE BESYLATE 10 MILLIGRAM(S): 2.5 TABLET ORAL at 07:11

## 2023-01-31 RX ADMIN — Medication 2: at 12:26

## 2023-01-31 RX ADMIN — REMDESIVIR 200 MILLIGRAM(S): 5 INJECTION INTRAVENOUS at 16:33

## 2023-01-31 RX ADMIN — INSULIN GLARGINE 16 UNIT(S): 100 INJECTION, SOLUTION SUBCUTANEOUS at 21:20

## 2023-01-31 RX ADMIN — CEFTRIAXONE 100 MILLIGRAM(S): 500 INJECTION, POWDER, FOR SOLUTION INTRAMUSCULAR; INTRAVENOUS at 13:54

## 2023-01-31 RX ADMIN — Medication 40 MILLIGRAM(S): at 07:12

## 2023-01-31 RX ADMIN — Medication 20 MILLIEQUIVALENT(S): at 13:56

## 2023-01-31 RX ADMIN — ENOXAPARIN SODIUM 40 MILLIGRAM(S): 100 INJECTION SUBCUTANEOUS at 16:34

## 2023-01-31 RX ADMIN — Medication 50 MILLIGRAM(S): at 17:21

## 2023-01-31 RX ADMIN — PANTOPRAZOLE SODIUM 40 MILLIGRAM(S): 20 TABLET, DELAYED RELEASE ORAL at 07:10

## 2023-01-31 RX ADMIN — Medication 4: at 16:33

## 2023-01-31 RX ADMIN — ATORVASTATIN CALCIUM 40 MILLIGRAM(S): 80 TABLET, FILM COATED ORAL at 21:18

## 2023-01-31 RX ADMIN — LISINOPRIL 40 MILLIGRAM(S): 2.5 TABLET ORAL at 07:10

## 2023-01-31 NOTE — PROGRESS NOTE ADULT - SUBJECTIVE AND OBJECTIVE BOX
68-year-old male past medical history of CAD status post stents diabetes hypertension hyperlipidemia coming in here for fevers chills and altered mental status    Today:  Seen at bedside, no new complaints.          REVIEW OF SYSTEMS:  No new complaints      MEDICATIONS  (STANDING):  amLODIPine   Tablet 10 milliGRAM(s) Oral daily  atorvastatin 40 milliGRAM(s) Oral at bedtime  cefTRIAXone   IVPB 1000 milliGRAM(s) IV Intermittent every 24 hours  chlorhexidine 2% Cloths 1 Application(s) Topical <User Schedule>  dextrose 5%. 1000 milliLiter(s) (50 mL/Hr) IV Continuous <Continuous>  dextrose 5%. 1000 milliLiter(s) (100 mL/Hr) IV Continuous <Continuous>  dextrose 50% Injectable 25 Gram(s) IV Push once  dextrose 50% Injectable 12.5 Gram(s) IV Push once  dextrose 50% Injectable 25 Gram(s) IV Push once  enoxaparin Injectable 40 milliGRAM(s) SubCutaneous every 24 hours  furosemide    Tablet 40 milliGRAM(s) Oral daily  gabapentin 300 milliGRAM(s) Oral three times a day  glucagon  Injectable 1 milliGRAM(s) IntraMuscular once  insulin glargine Injectable (LANTUS) 16 Unit(s) SubCutaneous at bedtime  insulin lispro (ADMELOG) corrective regimen sliding scale   SubCutaneous three times a day before meals  insulin lispro (ADMELOG) corrective regimen sliding scale   SubCutaneous at bedtime  insulin lispro Injectable (ADMELOG) 8 Unit(s) SubCutaneous before breakfast  insulin lispro Injectable (ADMELOG) 8 Unit(s) SubCutaneous before lunch  insulin lispro Injectable (ADMELOG) 8 Unit(s) SubCutaneous before dinner  lisinopril 40 milliGRAM(s) Oral daily  metoprolol succinate ER 50 milliGRAM(s) Oral two times a day  pantoprazole    Tablet 40 milliGRAM(s) Oral before breakfast  PARoxetine 30 milliGRAM(s) Oral daily  remdesivir  IVPB   IV Intermittent   remdesivir  IVPB 100 milliGRAM(s) IV Intermittent every 24 hours  sodium chloride 0.9%. 1000 milliLiter(s) (75 mL/Hr) IV Continuous <Continuous>    MEDICATIONS  (PRN):  acetaminophen     Tablet .. 650 milliGRAM(s) Oral every 6 hours PRN Temp greater or equal to 38C (100.4F), Mild Pain (1 - 3)  dextrose Oral Gel 15 Gram(s) Oral once PRN Blood Glucose LESS THAN 70 milliGRAM(s)/deciliter  ondansetron Injectable 4 milliGRAM(s) IV Push every 8 hours PRN Nausea and/or Vomiting      Allergies  No Known Allergies        FAMILY HISTORY:  No pertinent family history in first degree relatives        Vital Signs Last 24 Hrs  T(C): 36.3 (31 Jan 2023 05:42), Max: 36.8 (30 Jan 2023 14:05)  T(F): 97.4 (31 Jan 2023 05:42), Max: 98.3 (30 Jan 2023 14:05)  HR: 66 (31 Jan 2023 05:42) (64 - 66)  BP: 153/75 (31 Jan 2023 05:42) (153/71 - 157/67)  RR: 16 (31 Jan 2023 05:42) (16 - 16)          PHYSICAL EXAM:  GENERAL: NAD, well-groomed, well-developed, confused  HEAD:  Atraumatic, Normocephalic  EYES: EOMI, PERRLA, conjunctiva and sclera clear  ENMT: No tonsillar erythema, exudates, or enlargement; Moist mucous membranes, Good dentition, No lesions  NECK: Supple, No JVD, Normal thyroid  NERVOUS SYSTEM:  Alert & Oriented X3  CHEST/LUNG: CTA bilaterally; No rales, rhonchi, wheezing, or rubs  HEART: Regular rate and rhythm; No murmurs, rubs, or gallops  ABDOMEN: Soft, Nontender, Nondistended; Bowel sounds present  EXTREMITIES:  2+ Peripheral Pulses, No clubbing, cyanosis, or edema  : erythema of glans and shaft of penis improved, no discharge or skin breakdown, no lesions  SKIN: No rashes or lesions      LABS:                        12.4   6.20  )-----------( 206      ( 31 Jan 2023 08:09 )             36.2     01-31    x   |  x   |  x   ----------------------------<  x   x    |  x   |  1.0    Ca    8.9      31 Jan 2023 08:09    TPro  6.3  /  Alb  4.0  /  TBili  0.4  /  DBili  <0.2  /  AST  16  /  ALT  14  /  AlkPhos  66  01-31    PT/INR - ( 31 Jan 2023 08:17 )   PT: 14.90 sec;   INR: 1.30 ratio

## 2023-02-01 VITALS
SYSTOLIC BLOOD PRESSURE: 180 MMHG | RESPIRATION RATE: 18 BRPM | DIASTOLIC BLOOD PRESSURE: 88 MMHG | HEART RATE: 61 BPM | TEMPERATURE: 97 F

## 2023-02-01 LAB
ALBUMIN SERPL ELPH-MCNC: 4.2 G/DL — SIGNIFICANT CHANGE UP (ref 3.5–5.2)
ALBUMIN SERPL ELPH-MCNC: 4.2 G/DL — SIGNIFICANT CHANGE UP (ref 3.5–5.2)
ALP SERPL-CCNC: 70 U/L — SIGNIFICANT CHANGE UP (ref 30–115)
ALP SERPL-CCNC: 72 U/L — SIGNIFICANT CHANGE UP (ref 30–115)
ALT FLD-CCNC: 15 U/L — SIGNIFICANT CHANGE UP (ref 0–41)
ALT FLD-CCNC: 16 U/L — SIGNIFICANT CHANGE UP (ref 0–41)
ANION GAP SERPL CALC-SCNC: 12 MMOL/L — SIGNIFICANT CHANGE UP (ref 7–14)
AST SERPL-CCNC: 16 U/L — SIGNIFICANT CHANGE UP (ref 0–41)
AST SERPL-CCNC: 17 U/L — SIGNIFICANT CHANGE UP (ref 0–41)
BILIRUB DIRECT SERPL-MCNC: <0.2 MG/DL — SIGNIFICANT CHANGE UP (ref 0–0.3)
BILIRUB INDIRECT FLD-MCNC: >0.3 MG/DL — SIGNIFICANT CHANGE UP (ref 0.2–1.2)
BILIRUB SERPL-MCNC: 0.5 MG/DL — SIGNIFICANT CHANGE UP (ref 0.2–1.2)
BILIRUB SERPL-MCNC: 0.5 MG/DL — SIGNIFICANT CHANGE UP (ref 0.2–1.2)
BUN SERPL-MCNC: 22 MG/DL — HIGH (ref 10–20)
CALCIUM SERPL-MCNC: 9.3 MG/DL — SIGNIFICANT CHANGE UP (ref 8.4–10.5)
CHLORIDE SERPL-SCNC: 102 MMOL/L — SIGNIFICANT CHANGE UP (ref 98–110)
CO2 SERPL-SCNC: 28 MMOL/L — SIGNIFICANT CHANGE UP (ref 17–32)
CREAT SERPL-MCNC: 1.2 MG/DL — SIGNIFICANT CHANGE UP (ref 0.7–1.5)
CREAT SERPL-MCNC: 1.2 MG/DL — SIGNIFICANT CHANGE UP (ref 0.7–1.5)
EGFR: 66 ML/MIN/1.73M2 — SIGNIFICANT CHANGE UP
EGFR: 66 ML/MIN/1.73M2 — SIGNIFICANT CHANGE UP
GLUCOSE BLDC GLUCOMTR-MCNC: 136 MG/DL — HIGH (ref 70–99)
GLUCOSE BLDC GLUCOMTR-MCNC: 178 MG/DL — HIGH (ref 70–99)
GLUCOSE SERPL-MCNC: 146 MG/DL — HIGH (ref 70–99)
INR BLD: 1.13 RATIO — SIGNIFICANT CHANGE UP (ref 0.65–1.3)
POTASSIUM SERPL-MCNC: 4.1 MMOL/L — SIGNIFICANT CHANGE UP (ref 3.5–5)
POTASSIUM SERPL-SCNC: 4.1 MMOL/L — SIGNIFICANT CHANGE UP (ref 3.5–5)
PROT SERPL-MCNC: 6.7 G/DL — SIGNIFICANT CHANGE UP (ref 6–8)
PROT SERPL-MCNC: 6.7 G/DL — SIGNIFICANT CHANGE UP (ref 6–8)
PROTHROM AB SERPL-ACNC: 12.9 SEC — HIGH (ref 9.95–12.87)
SODIUM SERPL-SCNC: 142 MMOL/L — SIGNIFICANT CHANGE UP (ref 135–146)

## 2023-02-01 PROCEDURE — 99238 HOSP IP/OBS DSCHRG MGMT 30/<: CPT

## 2023-02-01 RX ADMIN — Medication 8 UNIT(S): at 12:12

## 2023-02-01 RX ADMIN — GABAPENTIN 300 MILLIGRAM(S): 400 CAPSULE ORAL at 13:20

## 2023-02-01 RX ADMIN — PANTOPRAZOLE SODIUM 40 MILLIGRAM(S): 20 TABLET, DELAYED RELEASE ORAL at 06:00

## 2023-02-01 RX ADMIN — GABAPENTIN 300 MILLIGRAM(S): 400 CAPSULE ORAL at 06:00

## 2023-02-01 RX ADMIN — LISINOPRIL 40 MILLIGRAM(S): 2.5 TABLET ORAL at 05:59

## 2023-02-01 RX ADMIN — Medication 40 MILLIGRAM(S): at 06:00

## 2023-02-01 RX ADMIN — Medication 8 UNIT(S): at 08:29

## 2023-02-01 RX ADMIN — Medication 30 MILLIGRAM(S): at 12:10

## 2023-02-01 RX ADMIN — Medication 50 MILLIGRAM(S): at 05:59

## 2023-02-01 RX ADMIN — AMLODIPINE BESYLATE 10 MILLIGRAM(S): 2.5 TABLET ORAL at 06:00

## 2023-02-01 RX ADMIN — Medication 2: at 12:11

## 2023-02-01 NOTE — DISCHARGE NOTE PROVIDER - HOSPITAL COURSE
68-year-old male past medical history of CAD status post stents diabetes hypertension hyperlipidemia coming in here for fevers chills and altered mental status    # AMS likely due to infection   # COVID +  - bld / urine cx neg   - RDV  - procal normal   - inflam markers not markedly elevated   -RPR, TSH, B12 normal    #Penile Cellulitis:  -Started after failed Barnes attempt in ED (patient now does not need a Barnes)  -completed Rocephin    #Hypertension  -  - C/w amlodipine 10 mg PO daily  - C/w lisinopril 40 mg PO daily (on benazapril at home)  -Lasix, metoprolol    #CAD s/p stent 2008  #HLD  - C/w Toprol 50 mg PO BID  - C/w Lipitor 40 mg PO qhs    #DM  # neuropathy   - will hold PO meds , start insulin coverage with SS and FS   - c/w sandrita     #Anxiety/depression  - C/w Paxil 30 mg PO daily    #Code status: DNR/DNI     PMD : Dr Garcia   cards : dr yong FARAH ready to STR

## 2023-02-01 NOTE — DISCHARGE NOTE NURSING/CASE MANAGEMENT/SOCIAL WORK - NSDCPEFALRISK_GEN_ALL_CORE
For information on Fall & Injury Prevention, visit: [email or web address] OR  [email or web address] OR  https://www.cdc.gov/steadi/patient.html

## 2023-02-01 NOTE — DISCHARGE NOTE PROVIDER - NSDCCPCAREPLAN_GEN_ALL_CORE_FT
PRINCIPAL DISCHARGE DIAGNOSIS  Diagnosis: 2019 novel coronavirus disease (COVID-19)  Assessment and Plan of Treatment: Resolved, completed remdesivir      SECONDARY DISCHARGE DIAGNOSES  Diagnosis: Penile cellulitis  Assessment and Plan of Treatment: Completed Rocephin

## 2023-02-01 NOTE — DISCHARGE NOTE PROVIDER - CARE PROVIDER_API CALL
MARY VALLADARES  Internal Medicine  [address] [place] [address]  Phone: [phone]  Fax: [phone]  Follow Up Time:

## 2023-02-01 NOTE — DISCHARGE NOTE PROVIDER - NSDCMRMEDTOKEN_GEN_ALL_CORE_FT
amLODIPine 10 mg oral tablet: 1 tab(s) orally once a day  atorvastatin 40 mg oral tablet: 1 tab(s) orally once a day  benazepril 40 mg oral tablet: 1 tab(s) orally once a day  furosemide 40 mg oral tablet: 1 tab(s) orally once a day  gabapentin 300 mg oral capsule: 1 cap(s) orally 3 times a day  glimepiride 4 mg oral tablet: 1 tab(s) orally 2 times a day (before meals)  meloxicam 15 mg oral tablet: 1 tab(s) orally once a day, As Needed  metFORMIN 500 mg oral tablet: 2 tab(s) orally 2 times a day  Metoprolol Succinate ER 50 mg oral tablet, extended release: 1 tab(s) orally 2 times a day  Paxil 30 mg oral tablet: 1 tab(s) orally once a day  pioglitazone 45 mg oral tablet: 1 tab(s) orally once a day  PriLOSEC 20 mg oral delayed release capsule: 1 cap(s) orally once a day

## 2023-02-01 NOTE — DISCHARGE NOTE NURSING/CASE MANAGEMENT/SOCIAL WORK - PATIENT PORTAL LINK FT
You can access the FollowMyHealth Patient Portal offered by [facility] by registering at the following website: [email or web address]. By joining [employer]’s FollowMyHealth portal, you will also be able to view your health information using other applications (apps) compatible with our system.

## 2023-02-02 LAB
BASOPHILS # BLD AUTO: 0.03 K/UL — SIGNIFICANT CHANGE UP (ref 0–0.2)
BASOPHILS NFR BLD AUTO: 0.5 % — SIGNIFICANT CHANGE UP (ref 0–1)
EOSINOPHIL # BLD AUTO: 0.34 K/UL — SIGNIFICANT CHANGE UP (ref 0–0.7)
EOSINOPHIL NFR BLD AUTO: 5.6 % — SIGNIFICANT CHANGE UP (ref 0–8)
GLUCOSE BLDC GLUCOMTR-MCNC: 173 MG/DL — HIGH (ref 70–99)
GLUCOSE BLDC GLUCOMTR-MCNC: 211 MG/DL — HIGH (ref 70–99)
HCT VFR BLD CALC: 38 % — LOW (ref 42–52)
HGB BLD-MCNC: 13.2 G/DL — LOW (ref 14–18)
IMM GRANULOCYTES NFR BLD AUTO: 0.8 % — HIGH (ref 0.1–0.3)
LYMPHOCYTES # BLD AUTO: 2.25 K/UL — SIGNIFICANT CHANGE UP (ref 1.2–3.4)
LYMPHOCYTES # BLD AUTO: 36.8 % — SIGNIFICANT CHANGE UP (ref 20.5–51.1)
MCHC RBC-ENTMCNC: 29.7 PG — SIGNIFICANT CHANGE UP (ref 27–31)
MCHC RBC-ENTMCNC: 34.7 G/DL — SIGNIFICANT CHANGE UP (ref 32–37)
MCV RBC AUTO: 85.6 FL — SIGNIFICANT CHANGE UP (ref 80–94)
MONOCYTES # BLD AUTO: 0.55 K/UL — SIGNIFICANT CHANGE UP (ref 0.1–0.6)
MONOCYTES NFR BLD AUTO: 9 % — SIGNIFICANT CHANGE UP (ref 1.7–9.3)
NEUTROPHILS # BLD AUTO: 2.89 K/UL — SIGNIFICANT CHANGE UP (ref 1.4–6.5)
NEUTROPHILS NFR BLD AUTO: 47.3 % — SIGNIFICANT CHANGE UP (ref 42.2–75.2)
NRBC # BLD: 0 /100 WBCS — SIGNIFICANT CHANGE UP (ref 0–0)
PLATELET # BLD AUTO: 210 K/UL — SIGNIFICANT CHANGE UP (ref 130–400)
RBC # BLD: 4.44 M/UL — LOW (ref 4.7–6.1)
RBC # FLD: 13.2 % — SIGNIFICANT CHANGE UP (ref 11.5–14.5)
WBC # BLD: 6.11 K/UL — SIGNIFICANT CHANGE UP (ref 4.8–10.8)
WBC # FLD AUTO: 6.11 K/UL — SIGNIFICANT CHANGE UP (ref 4.8–10.8)

## 2023-02-07 DIAGNOSIS — F32.A DEPRESSION, UNSPECIFIED: ICD-10-CM

## 2023-02-07 DIAGNOSIS — I25.10 ATHEROSCLEROTIC HEART DISEASE OF NATIVE CORONARY ARTERY WITHOUT ANGINA PECTORIS: ICD-10-CM

## 2023-02-07 DIAGNOSIS — I10 ESSENTIAL (PRIMARY) HYPERTENSION: ICD-10-CM

## 2023-02-07 DIAGNOSIS — T83.098A OTHER MECHANICAL COMPLICATION OF OTHER URINARY CATHETER, INITIAL ENCOUNTER: ICD-10-CM

## 2023-02-07 DIAGNOSIS — F41.9 ANXIETY DISORDER, UNSPECIFIED: ICD-10-CM

## 2023-02-07 DIAGNOSIS — N48.22 CELLULITIS OF CORPUS CAVERNOSUM AND PENIS: ICD-10-CM

## 2023-02-07 DIAGNOSIS — G93.41 METABOLIC ENCEPHALOPATHY: ICD-10-CM

## 2023-02-07 DIAGNOSIS — U07.1 COVID-19: ICD-10-CM

## 2023-02-07 DIAGNOSIS — I25.2 OLD MYOCARDIAL INFARCTION: ICD-10-CM

## 2023-02-07 DIAGNOSIS — Z95.5 PRESENCE OF CORONARY ANGIOPLASTY IMPLANT AND GRAFT: ICD-10-CM

## 2023-02-07 DIAGNOSIS — Z66 DO NOT RESUSCITATE: ICD-10-CM

## 2023-02-07 DIAGNOSIS — Z87.891 PERSONAL HISTORY OF NICOTINE DEPENDENCE: ICD-10-CM

## 2023-02-07 DIAGNOSIS — E78.5 HYPERLIPIDEMIA, UNSPECIFIED: ICD-10-CM

## 2023-02-07 DIAGNOSIS — E11.40 TYPE 2 DIABETES MELLITUS WITH DIABETIC NEUROPATHY, UNSPECIFIED: ICD-10-CM

## 2024-08-08 ENCOUNTER — APPOINTMENT (OUTPATIENT)
Dept: PODIATRY | Facility: CLINIC | Age: 69
End: 2024-08-08

## 2024-08-08 ENCOUNTER — OUTPATIENT (OUTPATIENT)
Dept: OUTPATIENT SERVICES | Facility: HOSPITAL | Age: 69
LOS: 1 days | End: 2024-08-08
Payer: MEDICARE

## 2024-08-08 DIAGNOSIS — Z00.00 ENCOUNTER FOR GENERAL ADULT MEDICAL EXAMINATION WITHOUT ABNORMAL FINDINGS: ICD-10-CM

## 2024-08-08 PROCEDURE — 99203 OFFICE O/P NEW LOW 30 MIN: CPT

## 2024-08-09 PROBLEM — E11.42 DM TYPE 2 WITH DIABETIC PERIPHERAL NEUROPATHY: Status: ACTIVE | Noted: 2024-08-09

## 2024-08-09 PROBLEM — M19.071 ARTHRITIS OF BOTH FEET: Status: ACTIVE | Noted: 2024-08-09

## 2024-08-09 PROBLEM — Q66.70 PES CAVUS: Status: ACTIVE | Noted: 2024-08-09

## 2024-08-09 PROBLEM — M77.41 METATARSALGIA OF BOTH FEET: Status: ACTIVE | Noted: 2024-08-09

## 2024-08-09 NOTE — ASSESSMENT
[FreeTextEntry1] : -submet 5 pain secondary to pes cavus deformity  -patient also experiences dorsal foot pain, which is exacerbated by cold temperature; symptoms related to OA -discussed accommodative sneakers with  rocker bottoms soles -avoid barefoot walking -patient would benefit from custom molded inserts with submet 5 cutout

## 2024-08-09 NOTE — HISTORY OF PRESENT ILLNESS
[FreeTextEntry1] : 68 y/o diabetic male presents with c/c chronic b/l foot pain. Pain exacerbated with ambulation. Additionally, patient reports chronic tingling/numbness in both feet

## 2024-08-09 NOTE — PHYSICAL EXAM
[General Appearance - Alert] : alert [General Appearance - In No Acute Distress] : in no acute distress [Pes Cavus] : pes cavus deformity [de-identified] : pop submet 5 b/l feet

## 2024-08-13 DIAGNOSIS — M77.41 METATARSALGIA, RIGHT FOOT: ICD-10-CM

## 2024-08-13 DIAGNOSIS — M19.071 PRIMARY OSTEOARTHRITIS, RIGHT ANKLE AND FOOT: ICD-10-CM

## 2024-08-13 DIAGNOSIS — Q66.70 CONGENITAL PES CAVUS, UNSPECIFIED FOOT: ICD-10-CM
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
## 2024-08-13 DIAGNOSIS — E11.42 TYPE 2 DIABETES MELLITUS WITH DIABETIC POLYNEUROPATHY: ICD-10-CM
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
## 2024-08-14 ENCOUNTER — NON-APPOINTMENT (OUTPATIENT)
Age: 69
End: 2024-08-14

## 2024-08-19 NOTE — ED ADULT NURSE NOTE - HISTORY OF COVID-19 VACCINATION
Yes negative normal/ROM intact/normal gait/strength 5/5 bilateral upper extremities/strength 5/5 bilateral lower extremities/back exam/extremities exam